# Patient Record
Sex: FEMALE | NOT HISPANIC OR LATINO | Employment: OTHER | URBAN - METROPOLITAN AREA
[De-identification: names, ages, dates, MRNs, and addresses within clinical notes are randomized per-mention and may not be internally consistent; named-entity substitution may affect disease eponyms.]

---

## 2017-05-23 ENCOUNTER — HOSPITAL ENCOUNTER (OUTPATIENT)
Dept: RADIOLOGY | Facility: HOSPITAL | Age: 82
Discharge: HOME | End: 2017-05-23
Attending: SURGERY
Payer: COMMERCIAL

## 2017-05-23 DIAGNOSIS — I70.211 ATHEROSCLEROSIS OF NATIVE ARTERY OF RIGHT LOWER EXTREMITY WITH INTERMITTENT CLAUDICATION (HCC): ICD-10-CM

## 2017-05-23 PROCEDURE — 93925 LOWER EXTREMITY STUDY: CPT

## 2017-05-23 PROCEDURE — 93923 UPR/LXTR ART STDY 3+ LVLS: CPT

## 2017-06-23 ENCOUNTER — ALLSCRIPTS OFFICE VISIT (OUTPATIENT)
Dept: OTHER | Facility: OTHER | Age: 82
End: 2017-06-23

## 2017-07-17 ENCOUNTER — TRANSCRIBE ORDERS (OUTPATIENT)
Dept: ADMINISTRATIVE | Facility: HOSPITAL | Age: 82
End: 2017-07-17

## 2018-01-14 VITALS
HEIGHT: 68 IN | DIASTOLIC BLOOD PRESSURE: 76 MMHG | HEART RATE: 80 BPM | SYSTOLIC BLOOD PRESSURE: 112 MMHG | WEIGHT: 224.31 LBS | BODY MASS INDEX: 34 KG/M2 | RESPIRATION RATE: 16 BRPM

## 2018-06-23 DIAGNOSIS — I70.211 ATHEROSCLEROSIS OF NATIVE ARTERY OF RIGHT LOWER EXTREMITY WITH INTERMITTENT CLAUDICATION (HCC): ICD-10-CM

## 2018-09-21 ENCOUNTER — OFFICE VISIT (OUTPATIENT)
Dept: FAMILY MEDICINE CLINIC | Facility: CLINIC | Age: 83
End: 2018-09-21
Payer: COMMERCIAL

## 2018-09-21 VITALS
BODY MASS INDEX: 30.71 KG/M2 | OXYGEN SATURATION: 97 % | HEART RATE: 91 BPM | WEIGHT: 202 LBS | RESPIRATION RATE: 16 BRPM

## 2018-09-21 DIAGNOSIS — I48.20 CHRONIC ATRIAL FIBRILLATION (HCC): ICD-10-CM

## 2018-09-21 DIAGNOSIS — E03.8 OTHER SPECIFIED HYPOTHYROIDISM: ICD-10-CM

## 2018-09-21 DIAGNOSIS — I10 ESSENTIAL HYPERTENSION: Primary | ICD-10-CM

## 2018-09-21 PROBLEM — I21.4 NON-ST ELEVATION (NSTEMI) MYOCARDIAL INFARCTION (HCC): Status: ACTIVE | Noted: 2018-09-21

## 2018-09-21 PROBLEM — E03.9 ACQUIRED HYPOTHYROIDISM: Status: ACTIVE | Noted: 2018-09-21

## 2018-09-21 PROBLEM — I73.9 PERIPHERAL VASCULAR DISEASE (HCC): Status: ACTIVE | Noted: 2018-09-21

## 2018-09-21 PROBLEM — F33.9 RECURRENT MAJOR DEPRESSIVE DISORDER (HCC): Status: ACTIVE | Noted: 2018-09-21

## 2018-09-21 PROBLEM — I48.91 ATRIAL FIBRILLATION (HCC): Status: ACTIVE | Noted: 2018-09-21

## 2018-09-21 PROCEDURE — 99213 OFFICE O/P EST LOW 20 MIN: CPT | Performed by: FAMILY MEDICINE

## 2018-09-21 PROCEDURE — 1160F RVW MEDS BY RX/DR IN RCRD: CPT | Performed by: FAMILY MEDICINE

## 2018-09-21 RX ORDER — ALPRAZOLAM 0.5 MG/1
0.5 TABLET ORAL
COMMUNITY
End: 2019-04-24 | Stop reason: SDUPTHER

## 2018-09-21 RX ORDER — CHONDROIT/COLLAGEN/HYALURON/AA 80-400-40
CAPSULE ORAL
COMMUNITY
End: 2019-11-27 | Stop reason: SDUPTHER

## 2018-09-21 RX ORDER — METOPROLOL SUCCINATE 50 MG/1
50 TABLET, EXTENDED RELEASE ORAL
COMMUNITY
Start: 2018-04-10 | End: 2021-01-01 | Stop reason: HOSPADM

## 2018-09-21 RX ORDER — SIMVASTATIN 40 MG
40 TABLET ORAL
COMMUNITY
Start: 2018-04-10 | End: 2021-01-01 | Stop reason: HOSPADM

## 2018-09-21 RX ORDER — LEVOTHYROXINE SODIUM 112 UG/1
TABLET ORAL
COMMUNITY
Start: 2018-08-29 | End: 2018-11-26 | Stop reason: SDUPTHER

## 2018-09-21 RX ORDER — VALSARTAN 320 MG
TABLET ORAL
COMMUNITY
Start: 2018-09-11 | End: 2021-01-01 | Stop reason: HOSPADM

## 2018-09-21 RX ORDER — ANTIOX #8/OM3/DHA/EPA/LUT/ZEAX 250-2.5 MG
CAPSULE ORAL
COMMUNITY
End: 2019-11-27 | Stop reason: SDUPTHER

## 2018-09-21 RX ORDER — WARFARIN SODIUM 5 MG/1
TABLET ORAL
COMMUNITY
Start: 2018-05-24 | End: 2021-01-01 | Stop reason: HOSPADM

## 2018-09-25 NOTE — PROGRESS NOTES
Assessment/Plan:    No problem-specific Assessment & Plan notes found for this encounter  Diagnoses and all orders for this visit:    Essential hypertension  -     CBC and differential; Future  -     Lipid panel; Future  -     Comprehensive metabolic panel; Future    Other specified hypothyroidism  -     TSH, 3rd generation; Future    Other orders  -     metoprolol succinate (TOPROL-XL) 50 mg 24 hr tablet; Take 100 mg by mouth  -     simvastatin (ZOCOR) 40 mg tablet; Take 40 mg by mouth  -     ALPRAZolam (XANAX) 0 5 mg tablet; Take 0 5 mg by mouth  -     Glucosamine-Chondroitin (GLUCOSAMINE CHONDR COMPLEX PO); Take 2 tablets by mouth  -     warfarin (COUMADIN) 5 mg tablet; Take 1/2 to 1 tablet daily or as directed  -     VITAMIN A PO; Take 1 tablet by mouth  -     DIOVAN 320 MG tablet;   -     Multiple Vitamins-Minerals (PRESERVISION AREDS 2) CAPS; Take by mouth  -     potassium chloride (KCl) 2 mEq/mL SOLN; Pt taking half tablet daily   -     MAGNESIUM PO; Take 1 tablet by mouth  -     levothyroxine 112 mcg tablet;   -     Hyaluronic Acid 400-80-40 MG CAPS; Take by mouth          Subjective:      Patient ID: Ricki Islas is a 80 y o  female  Patient is establishing as a new patient today at her previous doctor was Dr Gianni Mcelroy  Her med list was updated as well as her medical problems her atrial fibrillation with Coumadin is taken care by her cardiologist she did mention she has refused health maintenance with gyn exams Pap smears and colonoscopies she is aware that that is undiagnosed cancer that could have metastasis and fatality but outcome thus far an 80years old and i'm doing fine'        The following portions of the patient's history were reviewed and updated as appropriate: current medications, past family history, past medical history, past social history, past surgical history and problem list     Review of Systems   Constitutional: Negative  Eyes: Negative  Respiratory: Negative  Cardiovascular: Negative  Gastrointestinal: Negative  Objective:      Pulse 91   Resp 16   Wt 91 6 kg (202 lb)   SpO2 97%   BMI 30 71 kg/m²          Physical Exam   Constitutional: She appears well-developed  Cardiovascular: Normal rate, regular rhythm and normal heart sounds  Pulmonary/Chest: Effort normal and breath sounds normal  No respiratory distress  She has no wheezes  She has no rales  She exhibits no tenderness

## 2018-11-05 LAB
ALBUMIN SERPL-MCNC: 4.4 G/DL (ref 3.5–4.7)
ALBUMIN/GLOB SERPL: 1.4 {RATIO} (ref 1.2–2.2)
ALP SERPL-CCNC: 78 IU/L (ref 39–117)
ALT SERPL-CCNC: 14 IU/L (ref 0–32)
AST SERPL-CCNC: 21 IU/L (ref 0–40)
BASOPHILS # BLD AUTO: 0.1 X10E3/UL (ref 0–0.2)
BASOPHILS NFR BLD AUTO: 2 %
BILIRUB SERPL-MCNC: 0.5 MG/DL (ref 0–1.2)
BUN SERPL-MCNC: 27 MG/DL (ref 8–27)
BUN/CREAT SERPL: 19 (ref 12–28)
CALCIUM SERPL-MCNC: 9.3 MG/DL (ref 8.7–10.3)
CHLORIDE SERPL-SCNC: 102 MMOL/L (ref 96–106)
CHOLEST SERPL-MCNC: 178 MG/DL (ref 100–199)
CO2 SERPL-SCNC: 23 MMOL/L (ref 20–29)
CREAT SERPL-MCNC: 1.42 MG/DL (ref 0.57–1)
EOSINOPHIL # BLD AUTO: 0.4 X10E3/UL (ref 0–0.4)
EOSINOPHIL NFR BLD AUTO: 5 %
ERYTHROCYTE [DISTWIDTH] IN BLOOD BY AUTOMATED COUNT: 14.3 % (ref 12.3–15.4)
GLOBULIN SER-MCNC: 3.1 G/DL (ref 1.5–4.5)
GLUCOSE SERPL-MCNC: 107 MG/DL (ref 65–99)
HCT VFR BLD AUTO: 31.6 % (ref 34–46.6)
HDLC SERPL-MCNC: 54 MG/DL
HGB BLD-MCNC: 10.4 G/DL (ref 11.1–15.9)
IMM GRANULOCYTES # BLD: 0 X10E3/UL (ref 0–0.1)
IMM GRANULOCYTES NFR BLD: 0 %
LABCORP COMMENT: NORMAL
LDLC SERPL CALC-MCNC: 99 MG/DL (ref 0–99)
LYMPHOCYTES # BLD AUTO: 1.5 X10E3/UL (ref 0.7–3.1)
LYMPHOCYTES NFR BLD AUTO: 21 %
MCH RBC QN AUTO: 28.6 PG (ref 26.6–33)
MCHC RBC AUTO-ENTMCNC: 32.9 G/DL (ref 31.5–35.7)
MCV RBC AUTO: 87 FL (ref 79–97)
MONOCYTES # BLD AUTO: 0.9 X10E3/UL (ref 0.1–0.9)
MONOCYTES NFR BLD AUTO: 12 %
NEUTROPHILS # BLD AUTO: 4.5 X10E3/UL (ref 1.4–7)
NEUTROPHILS NFR BLD AUTO: 60 %
PLATELET # BLD AUTO: 266 X10E3/UL (ref 150–379)
POTASSIUM SERPL-SCNC: 4.9 MMOL/L (ref 3.5–5.2)
PROT SERPL-MCNC: 7.5 G/DL (ref 6–8.5)
RBC # BLD AUTO: 3.64 X10E6/UL (ref 3.77–5.28)
SL AMB EGFR AFRICAN AMERICAN: 40 ML/MIN/1.73
SL AMB EGFR NON AFRICAN AMERICAN: 34 ML/MIN/1.73
SL AMB VLDL CHOLESTEROL CALC: 25 MG/DL (ref 5–40)
SODIUM SERPL-SCNC: 139 MMOL/L (ref 134–144)
TRIGL SERPL-MCNC: 124 MG/DL (ref 0–149)
TSH SERPL DL<=0.005 MIU/L-ACNC: 2.39 UIU/ML (ref 0.45–4.5)
WBC # BLD AUTO: 7.5 X10E3/UL (ref 3.4–10.8)

## 2018-11-26 DIAGNOSIS — E03.9 ACQUIRED HYPOTHYROIDISM: Primary | ICD-10-CM

## 2018-11-26 RX ORDER — LEVOTHYROXINE SODIUM 112 UG/1
112 TABLET ORAL DAILY
Qty: 90 TABLET | Refills: 0 | Status: SHIPPED | OUTPATIENT
Start: 2018-11-26 | End: 2019-02-25 | Stop reason: SDUPTHER

## 2019-02-25 DIAGNOSIS — E03.9 ACQUIRED HYPOTHYROIDISM: ICD-10-CM

## 2019-02-25 RX ORDER — LEVOTHYROXINE SODIUM 112 UG/1
112 TABLET ORAL DAILY
Qty: 90 TABLET | Refills: 0 | Status: SHIPPED | OUTPATIENT
Start: 2019-02-25 | End: 2019-05-29 | Stop reason: SDUPTHER

## 2019-03-05 ENCOUNTER — OFFICE VISIT (OUTPATIENT)
Dept: PODIATRY | Facility: CLINIC | Age: 84
End: 2019-03-05
Payer: COMMERCIAL

## 2019-03-05 VITALS
SYSTOLIC BLOOD PRESSURE: 125 MMHG | HEART RATE: 82 BPM | WEIGHT: 202 LBS | DIASTOLIC BLOOD PRESSURE: 81 MMHG | RESPIRATION RATE: 17 BRPM | BODY MASS INDEX: 30.62 KG/M2 | HEIGHT: 68 IN

## 2019-03-05 DIAGNOSIS — M79.671 PAIN IN BOTH FEET: Primary | ICD-10-CM

## 2019-03-05 DIAGNOSIS — M79.672 PAIN IN BOTH FEET: Primary | ICD-10-CM

## 2019-03-05 DIAGNOSIS — B35.1 ONYCHOMYCOSIS: ICD-10-CM

## 2019-03-05 DIAGNOSIS — L03.039 PARONYCHIA OF TOENAIL, UNSPECIFIED LATERALITY: ICD-10-CM

## 2019-03-05 DIAGNOSIS — L60.0 INGROWN TOENAIL: ICD-10-CM

## 2019-03-05 DIAGNOSIS — I70.209 PERIPHERAL ARTERIOSCLEROSIS (HCC): ICD-10-CM

## 2019-03-05 PROCEDURE — 99202 OFFICE O/P NEW SF 15 MIN: CPT | Performed by: PODIATRIST

## 2019-03-05 NOTE — PROGRESS NOTES
Assessment/Plan:  Pain upon ambulation  Mild peripheral artery disease  Ingrown toenail left hallux  Paronychia bilateral   Mycosis of nail  Plan  Foot exam performed  Patient educated on condition  All mycotic nails debrided without pain or complication  No problem-specific Assessment & Plan notes found for this encounter  There are no diagnoses linked to this encounter  Subjective:  Patient has pain in her feet and toes with ambulation  She is seen on referral   She has pain in both big toes  Past Medical History:   Diagnosis Date    Basal cell carcinoma        Past Surgical History:   Procedure Laterality Date    MOHS SURGERY  01/1998    shaving lesion, on face below left side of nose basal cell carcinoma       Allergies   Allergen Reactions    Other Other (See Comments)     Category: Adverse Reaction; Annotation - [id number]: HORSE SERUM - Pt states was used several years ago in tetanus boosters when she was a child           Current Outpatient Medications:     ALPRAZolam (XANAX) 0 5 mg tablet, Take 0 5 mg by mouth, Disp: , Rfl:     DIOVAN 320 MG tablet, , Disp: , Rfl:     Glucosamine-Chondroitin (GLUCOSAMINE CHONDR COMPLEX PO), Take 2 tablets by mouth, Disp: , Rfl:     levothyroxine 112 mcg tablet, Take 1 tablet (112 mcg total) by mouth daily, Disp: 90 tablet, Rfl: 0    MAGNESIUM PO, Take 1 tablet by mouth, Disp: , Rfl:     metoprolol succinate (TOPROL-XL) 50 mg 24 hr tablet, Take 100 mg by mouth, Disp: , Rfl:     Multiple Vitamins-Minerals (PRESERVISION AREDS 2) CAPS, Take by mouth, Disp: , Rfl:     potassium chloride (KCl) 2 mEq/mL SOLN, Pt taking half tablet daily , Disp: , Rfl:     simvastatin (ZOCOR) 40 mg tablet, Take 40 mg by mouth, Disp: , Rfl:     VITAMIN A PO, Take 1 tablet by mouth, Disp: , Rfl:     warfarin (COUMADIN) 5 mg tablet, Take 1/2 to 1 tablet daily or as directed , Disp: , Rfl:     Hyaluronic Acid 400-80-40 MG CAPS, Take by mouth, Disp: , Rfl: Patient Active Problem List   Diagnosis    Non-ST elevation (NSTEMI) myocardial infarction [facility])    Peripheral vascular disease ([facility] )    Atrial fibrillation (HCC)    Recurrent major depressive disorder ([place] [address] )    Acquired hypothyroidism          Patient ID: Kristofer Burkett is a 80 y o  female  HPI    The following portions of the patient's history were reviewed and updated as appropriate: She  has a past medical history of Basal cell carcinoma  She   Patient Active Problem List    Diagnosis Date Noted    Non-ST elevation (NSTEMI) myocardial infarction ([place] [address] ) 09/21/2018    Peripheral vascular disease ([place] [address] ) 09/21/2018    Atrial fibrillation ([place] [address] ) 09/21/2018    Recurrent major depressive disorder ([place] [address] ) 09/21/2018    Acquired hypothyroidism 09/21/2018     She  has a past surgical history that includes Mohs surgery (01/1998)  Her family history includes Alzheimer's disease in her mother; Heart disease in her maternal uncle; Parkinsonism in her mother; Stroke in her father; Vascular Disease in her father  She  reports that she quit smoking about 31 years ago  She has never used smokeless tobacco  She reports that she does not drink alcohol or use drugs    Current Outpatient Medications   Medication Sig Dispense Refill    ALPRAZolam (XANAX) 0 5 mg tablet Take 0 5 mg by mouth      DIOVAN 320 MG tablet       Glucosamine-Chondroitin (GLUCOSAMINE CHONDR COMPLEX PO) Take 2 tablets by mouth      levothyroxine 112 mcg tablet Take 1 tablet (112 mcg total) by mouth daily 90 tablet 0    MAGNESIUM PO Take 1 tablet by mouth      metoprolol succinate (TOPROL-XL) 50 mg 24 hr tablet Take 100 mg by mouth      Multiple Vitamins-Minerals (PRESERVISION AREDS 2) CAPS Take by mouth      potassium chloride (KCl) 2 mEq/mL SOLN Pt taking half tablet daily       simvastatin (ZOCOR) 40 mg tablet Take 40 mg by mouth      VITAMIN A PO Take 1 tablet by mouth      warfarin (COUMADIN) 5 mg tablet Take 1/2 to 1 tablet daily or as directed   Hyaluronic Acid 400-80-40 MG CAPS Take by mouth       No current facility-administered medications for this visit  Current Outpatient Medications on File Prior to Visit   Medication Sig    ALPRAZolam (XANAX) 0 5 mg tablet Take 0 5 mg by mouth    DIOVAN 320 MG tablet     Glucosamine-Chondroitin (GLUCOSAMINE CHONDR COMPLEX PO) Take 2 tablets by mouth    levothyroxine 112 mcg tablet Take 1 tablet (112 mcg total) by mouth daily    MAGNESIUM PO Take 1 tablet by mouth    metoprolol succinate (TOPROL-XL) 50 mg 24 hr tablet Take 100 mg by mouth    Multiple Vitamins-Minerals (PRESERVISION AREDS 2) CAPS Take by mouth    potassium chloride (KCl) 2 mEq/mL SOLN Pt taking half tablet daily     simvastatin (ZOCOR) 40 mg tablet Take 40 mg by mouth    VITAMIN A PO Take 1 tablet by mouth    warfarin (COUMADIN) 5 mg tablet Take 1/2 to 1 tablet daily or as directed   Hyaluronic Acid 400-80-40 MG CAPS Take by mouth     No current facility-administered medications on file prior to visit  She is allergic to other       Vitals:    03/05/19 1034   BP: 125/81   Pulse: 82   Resp: 17       Review of Systems      Objective:  Patient's shoes and socks removed     Foot Exam    General  General Appearance: appears stated age and healthy   Orientation: alert and oriented to person, place, and time   Affect: appropriate       Right Foot/Ankle     Inspection and Palpation  Tenderness: metatarsals   Swelling: dorsum and metatarsals   Arch: pes planus  Hammertoes: fifth toe  Hallux valgus: yes  Hallux limitus: no  Skin Exam: dry skin;     Neurovascular  Dorsalis pedis: 1+  Posterior tibial: 1+  Superficial peroneal nerve sensation: diminished  Deep peroneal nerve sensation: diminished      Left Foot/Ankle      Inspection and Palpation  Tenderness: metatarsals   Swelling: dorsum and metatarsals   Arch: pes planus  Hammertoes: fifth toe  Hallux valgus: yes  Hallux limitus: no  Skin Exam: dry skin; Neurovascular  Dorsalis pedis: 1+  Posterior tibial: 1+  Superficial peroneal nerve sensation: diminished  Deep peroneal nerve sensation: diminished        Physical Exam   Constitutional: She appears well-developed and well-nourished  Cardiovascular: Normal rate and regular rhythm  Pulses:       Dorsalis pedis pulses are 1+ on the right side, and 1+ on the left side  Posterior tibial pulses are 1+ on the right side, and 1+ on the left side  Q, 9 findings bilateral   Negative digital hair  Positive abnormal cooling  Feet:   Right Foot:   Skin Integrity: Positive for dry skin  Left Foot:   Skin Integrity: Positive for dry skin  Skin: Capillary refill takes 2 to 3 seconds  Thin atrophic skin noted bilateral   Severe mycotic toenail bilateral   Hallux bilateral demonstrates wide incurvated nail with mild paronychia  Psychiatric: She has a normal mood and affect

## 2019-03-14 RX ORDER — MULTIVITAMIN/IRON/FOLIC ACID 18MG-0.4MG
1 TABLET ORAL
COMMUNITY
End: 2021-01-01 | Stop reason: HOSPADM

## 2019-03-14 RX ORDER — AMPICILLIN TRIHYDRATE 250 MG
500 CAPSULE ORAL
COMMUNITY
End: 2021-01-01 | Stop reason: HOSPADM

## 2019-04-24 ENCOUNTER — OFFICE VISIT (OUTPATIENT)
Dept: FAMILY MEDICINE CLINIC | Facility: CLINIC | Age: 84
End: 2019-04-24
Payer: COMMERCIAL

## 2019-04-24 VITALS
WEIGHT: 195 LBS | OXYGEN SATURATION: 98 % | BODY MASS INDEX: 29.55 KG/M2 | DIASTOLIC BLOOD PRESSURE: 60 MMHG | TEMPERATURE: 97.9 F | HEART RATE: 92 BPM | SYSTOLIC BLOOD PRESSURE: 112 MMHG | HEIGHT: 68 IN

## 2019-04-24 DIAGNOSIS — E03.9 ACQUIRED HYPOTHYROIDISM: ICD-10-CM

## 2019-04-24 DIAGNOSIS — I73.9 PVD (PERIPHERAL VASCULAR DISEASE) (HCC): ICD-10-CM

## 2019-04-24 DIAGNOSIS — D64.9 ANEMIA, UNSPECIFIED TYPE: Primary | ICD-10-CM

## 2019-04-24 DIAGNOSIS — I10 ESSENTIAL HYPERTENSION: ICD-10-CM

## 2019-04-24 LAB
SL AMB POCT HEMOGLOBIN AIC: 6 (ref ?–6.5)
SL AMB POCT HGB: 8.6

## 2019-04-24 PROCEDURE — 3074F SYST BP LT 130 MM HG: CPT | Performed by: FAMILY MEDICINE

## 2019-04-24 PROCEDURE — 99213 OFFICE O/P EST LOW 20 MIN: CPT | Performed by: FAMILY MEDICINE

## 2019-04-24 PROCEDURE — 85018 HEMOGLOBIN: CPT | Performed by: FAMILY MEDICINE

## 2019-04-24 PROCEDURE — 3078F DIAST BP <80 MM HG: CPT | Performed by: FAMILY MEDICINE

## 2019-04-24 PROCEDURE — 83036 HEMOGLOBIN GLYCOSYLATED A1C: CPT | Performed by: FAMILY MEDICINE

## 2019-04-24 RX ORDER — ALPRAZOLAM 0.5 MG/1
0.5 TABLET ORAL
Qty: 90 TABLET | Refills: 0 | Status: SHIPPED | OUTPATIENT
Start: 2019-04-24 | End: 2019-11-27

## 2019-05-29 DIAGNOSIS — E03.9 ACQUIRED HYPOTHYROIDISM: ICD-10-CM

## 2019-05-29 RX ORDER — LEVOTHYROXINE SODIUM 112 UG/1
112 TABLET ORAL DAILY
Qty: 90 TABLET | Refills: 0 | Status: SHIPPED | OUTPATIENT
Start: 2019-05-29 | End: 2019-09-10

## 2019-09-05 ENCOUNTER — OFFICE VISIT (OUTPATIENT)
Dept: FAMILY MEDICINE CLINIC | Facility: CLINIC | Age: 84
End: 2019-09-05
Payer: COMMERCIAL

## 2019-09-05 VITALS
BODY MASS INDEX: 30.17 KG/M2 | HEART RATE: 65 BPM | WEIGHT: 199.1 LBS | HEIGHT: 68 IN | RESPIRATION RATE: 20 BRPM | OXYGEN SATURATION: 97 % | SYSTOLIC BLOOD PRESSURE: 140 MMHG | TEMPERATURE: 97 F | DIASTOLIC BLOOD PRESSURE: 70 MMHG

## 2019-09-05 DIAGNOSIS — R73.9 HYPERGLYCEMIA: ICD-10-CM

## 2019-09-05 DIAGNOSIS — D64.9 ANEMIA, UNSPECIFIED TYPE: Primary | ICD-10-CM

## 2019-09-05 LAB — SL AMB POCT HEMOGLOBIN AIC: 5.8 (ref ?–6.5)

## 2019-09-05 PROCEDURE — 83036 HEMOGLOBIN GLYCOSYLATED A1C: CPT | Performed by: FAMILY MEDICINE

## 2019-09-05 PROCEDURE — 99213 OFFICE O/P EST LOW 20 MIN: CPT | Performed by: FAMILY MEDICINE

## 2019-09-05 RX ORDER — MULTIVITAMIN WITH IRON
1 TABLET ORAL DAILY
COMMUNITY
End: 2021-01-01 | Stop reason: HOSPADM

## 2019-09-09 DIAGNOSIS — E03.9 ACQUIRED HYPOTHYROIDISM: ICD-10-CM

## 2019-09-09 RX ORDER — LEVOTHYROXINE SODIUM 112 UG/1
112 TABLET ORAL DAILY
Qty: 90 TABLET | Refills: 0 | Status: CANCELLED | OUTPATIENT
Start: 2019-09-09

## 2019-09-10 DIAGNOSIS — E03.9 ACQUIRED HYPOTHYROIDISM: ICD-10-CM

## 2019-09-10 RX ORDER — LEVOTHYROXINE SODIUM 112 UG/1
112 TABLET ORAL DAILY
Qty: 90 TABLET | Refills: 0 | Status: SHIPPED | OUTPATIENT
Start: 2019-09-10 | End: 2019-11-27 | Stop reason: SDUPTHER

## 2019-09-16 NOTE — PROGRESS NOTES
Assessment/Plan:    No problem-specific Assessment & Plan notes found for this encounter  Diagnoses and all orders for this visit:    Anemia, unspecified type  -     Ferritin; Future    Hyperglycemia  -     POCT hemoglobin A1c    Other orders  -     potassium chloride (KCl) 2 mEq/mL SOLN; Pt taking half tablet daily  -     Magnesium 250 MG TABS; Take 1 tablet by mouth daily          Subjective:      Patient ID: Bakari Bolanos is a 80 y o  female  Here for follow up  recemntly saw cardio who referred her to vascular  As she is having trouble with claudication        The following portions of the patient's history were reviewed and updated as appropriate: current medications, past family history, past medical history, past social history, past surgical history and problem list     Review of Systems   Constitutional: Negative  HENT: Negative  Eyes: Negative  Respiratory: Negative  Cardiovascular: Negative  Gastrointestinal: Negative  Objective:      /70 (BP Location: Left arm, Patient Position: Sitting, Cuff Size: Adult)   Pulse 65   Temp (!) 97 °F (36 1 °C) (Tympanic)   Resp 20   Ht 5' 8" (1 727 m)   Wt 90 3 kg (199 lb 1 6 oz)   SpO2 97%   BMI 30 27 kg/m²          Physical Exam   Constitutional: She appears well-developed and well-nourished  HENT:   Head: Normocephalic and atraumatic  Cardiovascular: Normal rate, regular rhythm and normal heart sounds     Pulmonary/Chest: Effort normal and breath sounds normal

## 2019-11-27 ENCOUNTER — OFFICE VISIT (OUTPATIENT)
Dept: FAMILY MEDICINE CLINIC | Facility: CLINIC | Age: 84
End: 2019-11-27
Payer: COMMERCIAL

## 2019-11-27 VITALS
HEART RATE: 67 BPM | SYSTOLIC BLOOD PRESSURE: 140 MMHG | BODY MASS INDEX: 30.71 KG/M2 | WEIGHT: 202 LBS | OXYGEN SATURATION: 97 % | DIASTOLIC BLOOD PRESSURE: 58 MMHG | TEMPERATURE: 97.7 F | RESPIRATION RATE: 18 BRPM

## 2019-11-27 DIAGNOSIS — I73.9 PVD (PERIPHERAL VASCULAR DISEASE) (HCC): Primary | ICD-10-CM

## 2019-11-27 DIAGNOSIS — F41.9 ANXIETY: ICD-10-CM

## 2019-11-27 DIAGNOSIS — E03.9 ACQUIRED HYPOTHYROIDISM: ICD-10-CM

## 2019-11-27 DIAGNOSIS — L98.9 SKIN LESION: ICD-10-CM

## 2019-11-27 PROBLEM — H35.30 MACULAR DEGENERATION: Status: ACTIVE | Noted: 2019-11-27

## 2019-11-27 PROBLEM — Z95.0 CARDIAC RESYNCHRONIZATION THERAPY PACEMAKER (CRT-P) IN PLACE: Status: ACTIVE | Noted: 2019-11-27

## 2019-11-27 PROCEDURE — 1101F PT FALLS ASSESS-DOCD LE1/YR: CPT | Performed by: FAMILY MEDICINE

## 2019-11-27 PROCEDURE — 99213 OFFICE O/P EST LOW 20 MIN: CPT | Performed by: FAMILY MEDICINE

## 2019-11-27 RX ORDER — LEVOTHYROXINE SODIUM 112 UG/1
112 TABLET ORAL DAILY
Qty: 90 TABLET | Refills: 0 | Status: SHIPPED | OUTPATIENT
Start: 2019-11-27 | End: 2020-01-01 | Stop reason: SDUPTHER

## 2019-11-27 RX ORDER — ALPRAZOLAM 0.5 MG/1
0.5 TABLET ORAL
Qty: 90 TABLET | Refills: 0 | Status: SHIPPED | OUTPATIENT
Start: 2019-11-27 | End: 2021-01-01 | Stop reason: HOSPADM

## 2019-11-27 NOTE — PROGRESS NOTES
[address] Note  [place], 19     Medardo Ferrara MRN: [id number] : 1935 Age: 80 y o  Assessment/Plan        1  PVD (peripheral vascular disease) ([facility] [address] )  Ambulatory referral to Vascular Surgery   2  Acquired hypothyroidism  levothyroxine 112 mcg tablet   3  Skin lesion  Ambulatory referral to Dermatology   4  Anxiety  ALPRAZolam (XANAX) 0 5 mg tablet       Presents for general follow up and to establish care with new PCP  Overall doing well  For skin lesion, given her prior history of skin cancer recommended she either return to our office or to dermatologist for biopsy  For history of vascular disease referral placed per patient request, she states her cardiologist wanted her to be checked but local here is easier for her to be seen at  Takes Xanax prior to getting monthly eye injections for macular degeneration, refill provided today  Medardo Ferrara acknowledged understanding of treatment plan, all questions answered  Plan discussed with attending physician Dr Jose Jara  Subjective      Medardo Ferrara is a 80 y o  female  Presents today for follow up, establish care  Chronic medical conditions include macular degeneration (followed by [facility], Shanae Mina, has been getting injections regularly/monthly), history of NSTEMi, a fib with pacemaker, followed by cardiology (Dr Lorena Padgett)  Left anterior thigh skin lesion present for several years  Has not changed in size, color  No itching or bleeding  The following portions of the patient's history were reviewed and updated as appropriate: allergies, current medications, past family history, past medical history, past social history, past surgical history and problem list     Typically only needs Xanax before her eye injections  Review of Systems   Constitutional: Negative for chills and fever  Respiratory: Negative for cough and shortness of breath      Cardiovascular: Negative for chest pain and leg swelling  Current Outpatient Medications   Medication Sig Dispense Refill    ALPRAZolam (XANAX) 0 5 mg tablet Take 1 tablet (0 5 mg total) by mouth daily at bedtime as needed for anxiety 90 tablet 0    Cinnamon 500 MG capsule Take 500 mg by mouth      DIOVAN 320 MG tablet       Glucosamine-Chondroitin (GLUCOSAMINE CHONDR COMPLEX PO) Take 2 tablets by mouth      Glucosamine-MSM-Hyaluronic Acd (JO[employer] [facility]ALTH PO) Take 2 tablets by mouth      levothyroxine 112 mcg tablet Take 1 tablet (112 mcg total) by mouth daily 90 tablet 0    Magnesium 250 MG TABS Take 1 tablet by mouth daily      Multiple Vitamins-Minerals (CENTRUM ULTRA WOMENS) TABS Take 1 tablet by mouth      Multiple Vitamins-Minerals (PRESERVISION AREDS 2+MULTI VIT PO) Take 2 tablets by mouth      potassium chloride (KCl) 2 mEq/mL SOLN Pt taking half tablet daily      simvastatin (ZOCOR) 40 mg tablet Take 40 mg by mouth      VITAMIN D PO Take by mouth      warfarin (COUMADIN) 5 mg tablet Take 1/2 to 1 tablet daily or as directed   metoprolol succinate (TOPROL-XL) 50 mg 24 hr tablet Take 50 mg by mouth 3 tablets in the am and 3 tablets in the pm      VITAMIN A PO Take 1 tablet by mouth       No current facility-administered medications for this visit  Objective      /58 (BP Location: Left arm, Patient Position: Sitting)   Pulse 67   Temp 97 7 °F (36 5 °C) (Tympanic)   Resp 18   Wt 91 6 kg (202 lb)   SpO2 97%   BMI 30 71 kg/m²     Physical Exam   Constitutional: She is oriented to person, place, and time  She appears well-developed and well-nourished  HENT:   Head: Normocephalic and atraumatic  Cardiovascular: Normal rate and regular rhythm  Pulmonary/Chest: Effort normal and breath sounds normal    Neurological: She is alert and oriented to person, place, and time  Skin: Skin is warm and dry  Vitals reviewed        Left anterior thigh lesion, rectangular and raised with well demarcated bordered, dark brown, scaly, 1 5cm x 5cm     Some portions of this record may have been generated with voice recognition software  There may be translation, syntax, or grammatical errors  Occasional wrong word or "sound-a-like" substitutions may have occurred due to the inherent limitations of the voice recognition software  Read the chart carefully and recognize, using context, where substations may have occurred  If you have any questions, please contact the dictating provider for clarification or correction, as needed

## 2020-01-01 ENCOUNTER — HOSPITAL ENCOUNTER (OUTPATIENT)
Dept: RADIOLOGY | Facility: HOSPITAL | Age: 85
Discharge: HOME | End: 2020-05-20
Payer: COMMERCIAL

## 2020-01-01 ENCOUNTER — OFFICE VISIT (OUTPATIENT)
Dept: FAMILY MEDICINE CLINIC | Facility: CLINIC | Age: 85
End: 2020-01-01
Payer: COMMERCIAL

## 2020-01-01 ENCOUNTER — TELEPHONE (OUTPATIENT)
Dept: ADMINISTRATIVE | Facility: HOSPITAL | Age: 85
End: 2020-01-01

## 2020-01-01 ENCOUNTER — OFFICE VISIT (OUTPATIENT)
Dept: VASCULAR SURGERY | Facility: CLINIC | Age: 85
End: 2020-01-01
Payer: COMMERCIAL

## 2020-01-01 ENCOUNTER — TELEPHONE (OUTPATIENT)
Dept: FAMILY MEDICINE CLINIC | Facility: CLINIC | Age: 85
End: 2020-01-01

## 2020-01-01 VITALS
HEART RATE: 81 BPM | RESPIRATION RATE: 18 BRPM | SYSTOLIC BLOOD PRESSURE: 130 MMHG | BODY MASS INDEX: 28.19 KG/M2 | HEIGHT: 68 IN | TEMPERATURE: 97.3 F | DIASTOLIC BLOOD PRESSURE: 80 MMHG | WEIGHT: 186 LBS | OXYGEN SATURATION: 97 %

## 2020-01-01 VITALS
DIASTOLIC BLOOD PRESSURE: 76 MMHG | HEART RATE: 60 BPM | SYSTOLIC BLOOD PRESSURE: 124 MMHG | WEIGHT: 190 LBS | HEIGHT: 68 IN | BODY MASS INDEX: 28.79 KG/M2 | TEMPERATURE: 97.9 F

## 2020-01-01 DIAGNOSIS — B35.1 ONYCHOMYCOSIS: ICD-10-CM

## 2020-01-01 DIAGNOSIS — I73.9 PVD (PERIPHERAL VASCULAR DISEASE) (HCC): ICD-10-CM

## 2020-01-01 DIAGNOSIS — I73.9 PERIPHERAL VASCULAR DISEASE (HCC): ICD-10-CM

## 2020-01-01 DIAGNOSIS — E03.9 ACQUIRED HYPOTHYROIDISM: ICD-10-CM

## 2020-01-01 DIAGNOSIS — I48.0 PAROXYSMAL ATRIAL FIBRILLATION (HCC): ICD-10-CM

## 2020-01-01 DIAGNOSIS — I70.209 PERIPHERAL ARTERIOSCLEROSIS (HCC): Primary | ICD-10-CM

## 2020-01-01 DIAGNOSIS — I70.209 PERIPHERAL ARTERIOSCLEROSIS (HCC): ICD-10-CM

## 2020-01-01 DIAGNOSIS — L60.0 INGROWN TOENAIL: ICD-10-CM

## 2020-01-01 DIAGNOSIS — N90.89 VULVAR MASS: Primary | ICD-10-CM

## 2020-01-01 PROCEDURE — 93925 LOWER EXTREMITY STUDY: CPT

## 2020-01-01 PROCEDURE — 93925 LOWER EXTREMITY STUDY: CPT | Performed by: SURGERY

## 2020-01-01 PROCEDURE — 93978 VASCULAR STUDY: CPT

## 2020-01-01 PROCEDURE — 93923 UPR/LXTR ART STDY 3+ LVLS: CPT

## 2020-01-01 PROCEDURE — 3078F DIAST BP <80 MM HG: CPT | Performed by: PHYSICIAN ASSISTANT

## 2020-01-01 PROCEDURE — 99214 OFFICE O/P EST MOD 30 MIN: CPT | Performed by: PHYSICIAN ASSISTANT

## 2020-01-01 PROCEDURE — 3074F SYST BP LT 130 MM HG: CPT | Performed by: PHYSICIAN ASSISTANT

## 2020-01-01 PROCEDURE — 1160F RVW MEDS BY RX/DR IN RCRD: CPT | Performed by: PHYSICIAN ASSISTANT

## 2020-01-01 PROCEDURE — 1036F TOBACCO NON-USER: CPT | Performed by: PHYSICIAN ASSISTANT

## 2020-01-01 PROCEDURE — 93922 UPR/L XTREMITY ART 2 LEVELS: CPT | Performed by: SURGERY

## 2020-01-01 PROCEDURE — 93978 VASCULAR STUDY: CPT | Performed by: SURGERY

## 2020-01-01 PROCEDURE — 99213 OFFICE O/P EST LOW 20 MIN: CPT | Performed by: FAMILY MEDICINE

## 2020-01-01 RX ORDER — LEVOTHYROXINE SODIUM 112 UG/1
112 TABLET ORAL DAILY
Qty: 90 TABLET | Refills: 0 | Status: SHIPPED | OUTPATIENT
Start: 2020-01-01 | End: 2020-01-01

## 2020-01-01 RX ORDER — LEVOTHYROXINE SODIUM 112 UG/1
TABLET ORAL
Qty: 90 TABLET | Refills: 0 | Status: SHIPPED | OUTPATIENT
Start: 2020-01-01 | End: 2020-01-01 | Stop reason: SDUPTHER

## 2020-01-01 RX ORDER — LEVOTHYROXINE SODIUM 112 UG/1
112 TABLET ORAL DAILY
Qty: 90 TABLET | Refills: 1 | Status: SHIPPED | OUTPATIENT
Start: 2020-01-01 | End: 2021-01-01

## 2020-01-01 RX ORDER — LEVOTHYROXINE SODIUM 112 UG/1
112 TABLET ORAL DAILY
Qty: 90 TABLET | Refills: 0 | OUTPATIENT
Start: 2020-01-01

## 2020-01-03 ENCOUNTER — CONSULT (OUTPATIENT)
Dept: VASCULAR SURGERY | Facility: CLINIC | Age: 85
End: 2020-01-03
Payer: COMMERCIAL

## 2020-01-03 VITALS
WEIGHT: 201 LBS | BODY MASS INDEX: 30.46 KG/M2 | HEART RATE: 68 BPM | SYSTOLIC BLOOD PRESSURE: 148 MMHG | DIASTOLIC BLOOD PRESSURE: 70 MMHG | HEIGHT: 68 IN

## 2020-01-03 DIAGNOSIS — I73.9 PERIPHERAL VASCULAR DISEASE (HCC): Primary | ICD-10-CM

## 2020-01-03 DIAGNOSIS — I70.209 PERIPHERAL ARTERIOSCLEROSIS (HCC): ICD-10-CM

## 2020-01-03 DIAGNOSIS — I73.9 PVD (PERIPHERAL VASCULAR DISEASE) (HCC): ICD-10-CM

## 2020-01-03 PROCEDURE — 99204 OFFICE O/P NEW MOD 45 MIN: CPT | Performed by: PHYSICIAN ASSISTANT

## 2020-01-03 PROCEDURE — 1160F RVW MEDS BY RX/DR IN RCRD: CPT | Performed by: PHYSICIAN ASSISTANT

## 2020-01-03 NOTE — PATIENT INSTRUCTIONS
Peripheral arterial disease with non-lifestyle limiting claudication  Atrial fibrillation      -complete basic activities the comfortably without leg pain  No wounds or rest pain  -complains of R hip pain/aches with activity      Plan:  -continue a regular walking program to improve circulation  -continue with healthy lifestyle choices - including: heart healthy cardiac diet, low sodium    -non-smoker  -continue with optimally tolerated medical therapy on warfarin and simvastatin 40  -we reviewed mostly recent labs which include A1c equal to 6  Last lipids November 2018 LDL 99; LDL goal less than 70  -check AOIL and BARBARA in 6 months  -we reviewed most recent vascular study reports which I have access to which include November/2018 BELLA on the right 0 52, BELLA on the left 0 78   -Follow up in 1 year or sooner if problems  -Call to be seen sooner, if increased leg pain, numbness, coldness, or wounds

## 2020-01-03 NOTE — LETTER
January 5, 2020     Mary León, [address]    Patient: Brea Urbina   YOB: 1935   Date of Visit: 1/3/2020     Dear Dr Anurag Nava      Thank you for referring Josse Hernandez to me for evaluation  Below are the relevant portions of my assessment and plan of care  If you have questions, please do not hesitate to call me  I look forward to following Viki Caro along with you  Sincerely,        Allison Kinney PA-C        CC: Nato Rhoades, DO    Progress Notes:    Assessment/Plan:    Peripheral vascular disease ([facility] [address] )    Peripheral arterial disease with non-lifestyle limiting claudication  Atrial fibrillation  Hypertension  Hyperlipidemia    -complete basic activities the comfortably without leg pain  No rest pain, night cramps or wounds     -complains of chronic R hip pain/aches with activity (claudication v orthopedic )    VAS BARBARA 5/23/17  R 0 42/95/28; significant LE arterial occlusive disease; > 75% CFA; < 50% PFA   L 0 97/161/90; no evidence of significant LE arterial disease    No recent labs  LDL 99 and A1C 6 (Nov '18)    Plan:  -Patient would like to continue with conservative measures and routine surveillance of peripheral arterial disease    -Continue a regular walking program to improve circulation   -Continue with healthy lifestyle choices - including: heart healthy cardiac diet, low sodium    -Non-smoker  -Continue with optimally tolerated medical therapy on warfarin and simvastatin 40   -LDL goal less than 70   -We reviewed vascular study reports of which I have access  -Check AOIL and BARBARA  -Follow up to review vascular studies   -We discussed reasons why she should be seen sooner and indications for which we may need to consider invasive procedures - increased leg pain, numbness, coldness, or wounds

## 2020-01-03 NOTE — PROGRESS NOTES
Assessment/Plan:    Peripheral vascular disease ([facility] [address] )    Peripheral arterial disease with non-lifestyle limiting claudication  Atrial fibrillation  Hypertension  Hyperlipidemia    -complete basic activities the comfortably without leg pain  No rest pain, night cramps or wounds     -complains of chronic R hip pain/aches with activity (claudication v orthopedic )    VAS BARBARA 5/23/17  R 0 42/95/28; significant LE arterial occlusive disease; > 75% CFA; < 50% PFA   L 0 97/161/90; no evidence of significant LE arterial disease    No recent labs  LDL 99 and A1C 6 (Nov '18)    Plan:  -Patient would like to continue with conservative measures and routine surveillance of peripheral arterial disease    -Continue a regular walking program to improve circulation   -Continue with healthy lifestyle choices - including: heart healthy cardiac diet, low sodium    -Non-smoker  -Continue with optimally tolerated medical therapy on warfarin and simvastatin 40   -LDL goal less than 70   -We reviewed vascular study reports of which I have access  -Check AOIL and BARBARA  -Follow up to review vascular studies   -We discussed reasons why she should be seen sooner and indications for which we may need to consider invasive procedures - increased leg pain, numbness, coldness, or wounds  Subjective:      Patient ID: Marah Christensen is a 80 y o  female  Pt is a new patient at our pratice, referred by Dr Stephon Watkins  Pt is asymptomatic at this time  R>L  Pt currently takes Simvastatin, warfarin  HPI    Miguel Crawley 80 y  o DM, Htn, HLD, PAF, CKD, PPM/CRT, AIOD, peripheral arterial disease who is referred for consult regarding PAD  Patient reports that she is not very active but she is able to complete most basic activities without leg pain  When she exerts herself too much, she develops R hip pain which resolves with rest  She reports that she has R hip pain chronically which has not been worsening   She denies thigh and calf claudication at her level of activity  She has no rest pain or wounds  She does not recall any recent vascular studies  She is a former patient of Dr Ariella Briones and she receives cardiac care at [facility]  I reviewed her Epic and Care Everywhere charts and we reviewed her most recent labs and cardiovascular studies  She is on Simvastatin and warfarin  Echo 4/2/19: EF 60%  Carotid 11/30/18 ([facility]):  R 0-19%, L 20-49%    VAS BARBARA 11/26/18 ([facility])  R: Doppler waveforms in the right lower extremity are consistent with severe    upstream aorto-iliac disease  The peroneal artery was not visualized  Resting BELLA: 0 52     L: Doppler waveforms in the left  lower extremity are consistent with  moderate     upstream proximal aorto-iliac disease  No hemodynamically significant stenosis in the left lower extremity  Resting BELLA: 0 78     VAS [facility] 5/23/17 ([facility]  R 0 42/95/28; significant LE arterial occlusive disease; > 75% CFA; < 50% PFA   L  0 97/161/90; no evidence of significant LE arterial disease    The following portions of the patient's history were reviewed and updated as appropriate: allergies, current medications, past family history, past medical history, past social history, past surgical history and problem list     Review of Systems   Constitutional: Negative  HENT: Negative  Eyes: Negative  Respiratory: Negative  Negative for chest tightness and shortness of breath  Cardiovascular: Negative  Negative for chest pain  Gastrointestinal: Negative  Endocrine: Negative  Genitourinary: Negative  Musculoskeletal: Negative  Negative for gait problem  Skin: Positive for color change (Discoloration R>L)  Allergic/Immunologic: Negative  Neurological: Negative for weakness and numbness  Hematological: Negative  Psychiatric/Behavioral: Negative          Objective:    /70 (BP Location: Left arm, Patient Position: Sitting)   Pulse 68   Ht 5' 8" (1 727 m)   Wt 91 2 kg (201 lb)   BMI 30 56 kg/m²      Physical Exam   Constitutional: She is oriented to person, place, and time  She appears well-developed and well-nourished  She is cooperative  HENT:   Head: Normocephalic and atraumatic  Eyes: Pupils are equal, round, and reactive to light  EOM are normal    Neck: Trachea normal  Neck supple  No JVD present  No thyromegaly present  Cardiovascular: Normal rate, S1 normal and S2 normal  An irregularly irregular rhythm present  Exam reveals no gallop and no friction rub  Murmur (2/6 sm) heard  Pulses:       Carotid pulses are 2+ on the right side, and 2+ on the left side  Radial pulses are 2+ on the right side, and 2+ on the left side  Femoral pulses are 0 on the right side, and 2+ on the left side  Popliteal pulses are 0 on the right side  Dorsalis pedis pulses are 0 on the right side, and 1+ on the left side  Posterior tibial pulses are 0 on the right side  Pulmonary/Chest: Effort normal and breath sounds normal  No accessory muscle usage  No respiratory distress  She has no wheezes  She has no rales  Abdominal: Soft  Bowel sounds are normal  She exhibits no distension  There is no hepatosplenomegaly  There is no tenderness  Musculoskeletal: Normal range of motion  She exhibits no edema or deformity  Neurological: She is alert and oriented to person, place, and time  Grossly normal    Skin: Skin is warm and dry  No lesion and no rash noted  No cyanosis  Nails show no clubbing  Psychiatric: She has a normal mood and affect  Nursing note and vitals reviewed  VAS BARBARA 5/23/17  RIGHT LOWER LIMB:Abnormal  Evaluation shows greater than 75% stenosis in the common femoral artery and  less than 50 % stenosis in the profunda femoral artery  This resting evaluation shows evidence of significant lower extremity arterial  occlusive disease  Ankle/Brachial index:  0 42  , Prior 0 46  PVR/ PPG tracings are dampened    Metatarsal pressure of  95 mmHg, Prior 25  Great toe pressure of  28 mmHg, within the healing range, Prior 28 mmHg  LEFT LOWER LIMB:  This resting evaluation shows no evidence of significant lower extremity  arterial occlusive disease  Ankle/Brachial index: 0 97     , Prior 1 12  PVR/ PPG tracings are normal   Metatarsal pressure of  161mmHg, Prior 240 mmHg  Great toe pressure of  90mmHg, within the healing range, Prior 95mmHg           I have reviewed and made appropriate changes to the review of systems input by the medical assistant  Vitals:    01/03/20 1456   BP: 148/70   BP Location: Left arm   Patient Position: Sitting   Pulse: 68   Weight: 91 2 kg (201 lb)   Height: 5' 8" (1 727 m)       Patient Active Problem List   Diagnosis    Non-ST elevation (NSTEMI) myocardial infarction [facility])    Peripheral vascular disease (HCC)    Atrial fibrillation (HCC)    Recurrent major depressive disorder (HCC)    Acquired hypothyroidism    Pain in both feet    Peripheral arteriosclerosis (HCC)    Onychomycosis    Ingrown toenail    Paronychia of toenail    Macular degeneration    Cardiac resynchronization therapy pacemaker (CRT-P) in place       Past Surgical History:   Procedure Laterality Date    MOHS SURGERY  01/1998    shaving lesion, on face below left side of nose basal cell carcinoma       Family History   Problem Relation Age of Onset    Alzheimer's disease Mother     Parkinsonism Mother         symptomatic    Stroke Father     Vascular Disease Father     Heart disease Maternal Uncle         cardiac       Social History     Socioeconomic History    Marital status:       Spouse name: Not on file    Number of children: Not on file    Years of education: Not on file    Highest education level: Not on file   Occupational History    Not on file   Social Needs    Financial resource strain: Not on file    Food insecurity:     Worry: Not on file     Inability: Not on file    Transportation needs:     Medical: Not on file Non-medical: Not on file   Tobacco Use    Smoking status: Former Smoker     Last attempt to quit:      Years since quittin 0    Smokeless tobacco: Never Used   Substance and Sexual Activity    Alcohol use: No    Drug use: No    Sexual activity: Not on file   Lifestyle    Physical activity:     Days per week: Not on file     Minutes per session: Not on file    Stress: Not on file   Relationships    Social connections:     Talks on phone: Not on file     Gets together: Not on file     Attends Christianity service: Not on file     Active member of club or organization: Not on file     Attends meetings of clubs or organizations: Not on file     Relationship status: Not on file    Intimate partner violence:     Fear of current or ex partner: Not on file     Emotionally abused: Not on file     Physically abused: Not on file     Forced sexual activity: Not on file   Other Topics Concern    Not on file   Social History Narrative    Not on file       Allergies   Allergen Reactions    Other Other (See Comments)     Category: Adverse Reaction; Annotation - [id number]: HORSE SERUM - Pt states was used several years ago in tetanus boosters when she was a child           Current Outpatient Medications:     ALPRAZolam (XANAX) 0 5 mg tablet, Take 1 tablet (0 5 mg total) by mouth daily at bedtime as needed for anxiety, Disp: 90 tablet, Rfl: 0    Cinnamon 500 MG capsule, Take 500 mg by mouth, Disp: , Rfl:     Copper Gluconate (COPPER CAPS PO), Take by mouth, Disp: , Rfl:     DIOVAN 320 MG tablet, , Disp: , Rfl:     Glucosamine-Chondroitin (GLUCOSAMINE CHONDR COMPLEX PO), Take 2 tablets by mouth, Disp: , Rfl:     Glucosamine-MSM-Hyaluronic Acd (JOINT HEALTH PO), Take 2 tablets by mouth, Disp: , Rfl:     levothyroxine 112 mcg tablet, Take 1 tablet (112 mcg total) by mouth daily, Disp: 90 tablet, Rfl: 0    Magnesium 250 MG TABS, Take 1 tablet by mouth daily, Disp: , Rfl:     metoprolol succinate (TOPROL-XL) 50 mg 24 hr tablet, Take 50 mg by mouth 3 tablets in the am and 3 tablets in the pm, Disp: , Rfl:     Multiple Vitamins-Minerals (CENTRUM ULTRA WOMENS) TABS, Take 1 tablet by mouth, Disp: , Rfl:     Multiple Vitamins-Minerals (PRESERVISION AREDS 2+MULTI VIT PO), Take 2 tablets by mouth, Disp: , Rfl:     potassium chloride (KCl) 2 mEq/mL SOLN, Pt taking half tablet daily, Disp: , Rfl:     simvastatin (ZOCOR) 40 mg tablet, Take 40 mg by mouth, Disp: , Rfl:     VITAMIN D PO, Take by mouth, Disp: , Rfl:     warfarin (COUMADIN) 5 mg tablet, Take 1/2 to 1 tablet daily or as directed , Disp: , Rfl:     Zinc Sulfate (ZINC 15 PO), Take by mouth, Disp: , Rfl:     VITAMIN A PO, Take 1 tablet by mouth, Disp: , Rfl:

## 2020-01-05 NOTE — ASSESSMENT & PLAN NOTE
Peripheral arterial disease with non-lifestyle limiting claudication  Atrial fibrillation  Hypertension  Hyperlipidemia    -complete basic activities the comfortably without leg pain  No rest pain, night cramps or wounds     -complains of chronic R hip pain/aches with activity (claudication v orthopedic )    VAS BARBARA 5/23/17  R 0 42/95/28; significant LE arterial occlusive disease; > 75% CFA; < 50% PFA   L 0 97/161/90; no evidence of significant LE arterial disease    No recent labs  LDL 99 and A1C 6 (Nov '18)    Plan:  -Patient would like to continue with conservative measures and routine surveillance of peripheral arterial disease    -Continue a regular walking program to improve circulation   -Continue with healthy lifestyle choices - including: heart healthy cardiac diet, low sodium    -Non-smoker  -Continue with optimally tolerated medical therapy on warfarin and simvastatin 40   -LDL goal less than 70   -We reviewed vascular study reports of which I have access  -Check AOIL and BARBARA  -Follow up to review vascular studies   -We discussed reasons why she should be seen sooner and indications for which we may need to consider invasive procedures - increased leg pain, numbness, coldness, or wounds

## 2020-07-17 NOTE — PATIENT INSTRUCTIONS
Peripheral arterial disease with claudication  Coronary artery disease  Atrial fibrillation    -Doing well; no leg pain with regular activity  -L great toe fungus, sl ingrown toe nail and superficial friction wound to distal toe  -Non-smoker since 1988      Plan:  - protect feet with good shoes   - check feet daily for wounds  - Bacitracin to toe; consider topical toe antifungal; follow up with podiatry  - Try to walk every day building up to 30 minutes a day  You can stop as needed  - Contact GYN ([facility] [place])    - Continue with warfarin and simvastatin 40  - check lower extremity arterial duplex in 1 year with office, or sooner if needed          Peripheral Artery Disease   AMBULATORY CARE:   Peripheral artery disease (PAD)  is narrow, weak, or blocked arteries  It may affect any arteries outside of your heart and brain  PAD is usually the result of a buildup of fat and cholesterol, also called plaque, along your artery walls  Inflammation, a blood clot, or abnormal cell growth could also block your arteries  PAD prevents normal blood flow to your legs and arms  You are at risk of an amputation if poor blood flow keeps wounds from healing or causes gangrene (tissue death)  Without treatment, PAD can also cause a heart attack or stroke  Common symptoms include:  Mild PAD usually does not cause symptoms   As the disease worsens over time, you may have the following:  · Pain or cramps in your leg or hip while you walk     · A numb, weak, or heavy feeling in your legs     · Dry, scaly, red, or pale skin on your legs     · Thick or brittle nails, or hair loss on your arms and legs     · Foot sores that will not heal     · Burning or aching in your feet and toes while resting (this may be worse when you lie down)  Call 911 for the following:   · You have any of the following signs of a heart attack:      ¨ Squeezing, pressure, or pain in your chest that lasts longer than 5 minutes or returns    ¨ Discomfort or pain in your back, neck, jaw, stomach, or arm     ¨ Trouble breathing    ¨ Nausea or vomiting    ¨ Lightheadedness or a sudden cold sweat, especially with chest pain or trouble breathing    · You have any of the following signs of a stroke:      ¨ Numbness or drooping on one side of your face     ¨ Weakness in an arm or leg    ¨ Confusion or difficulty speaking    ¨ Dizziness, a severe headache, or vision loss  Seek care immediately if:   · You have sores or wounds that will not heal      · You notice black or discolored skin on your arm or leg  · Your skin is cool to the touch  Contact your healthcare provider if:   · You have leg pain when you walk 1/8 mile (200 meters) or less, even with treatment  · Your legs are red, dry, or pale, even with treatment  · You have questions or concerns about your condition or care  Treatment for PAD  can help reduce your risk of a heart attack, stroke, or amputation  You may need more than one of the following:  · Medicines  may be given to prevent blood clots and reduce the risk of a heart attack or stroke  You may be given medicine to help prevent your PAD from getting worse  · A supervised exercise program  helps you stay active in normal daily activities and may prevent disability  Healthcare providers will help you safely walk or do strength training exercises 3 times a week for 30 to 60 minutes  You will do this for several months, then transition to walking on your own  · Angioplasty  is a procedure to open your artery so blood can flow through normally  A thin tube called a catheter is used to insert a small balloon into your artery  The balloon is inflated to open your blocked artery, and then removed  A tube called a stent may be placed in your artery to hold it open  · Bypass surgery  is used to make a new connection to your artery with a vein from another part of your body, or an artificial graft   The vein or graft is attached to your artery above and below your blockage  This allows blood to flow around the blocked portion of your artery  Manage and prevent PAD:   · Walk for 30 to 60 minutes at least 4 times a week  Your healthcare provider may also refer you to an supervised exercise program  The program helps increase how far you can walk without pain  It also helps you stay active in normal daily activities and may prevent disability caused by PAD  · Do not smoke  Nicotine and other chemicals in cigarettes and cigars can worsen PAD  They can also increase your risk for a heart attack or stroke  Ask your healthcare provider for information if you currently smoke and need help to quit  E-cigarettes or smokeless tobacco still contain nicotine  Talk to your healthcare provider before you use these products  · Manage other health conditions  Take your medicines as directed and follow your healthcare provider's instructions if you have high blood pressure or high cholesterol  Perform foot care and check your blood sugar levels as directed if you have diabetes  · Eat heart healthy foods  Eat whole grains, fruits, and vegetables every day  Limit salt and high-fat foods  Ask your healthcare provider for more information on a heart healthy diet  Ask if you need to lose weight  Your healthcare provider can help you create a healthy weight-loss plan  Follow up with your healthcare provider as directed:  Write down your questions so you remember to ask them during your visits  © 2017 [employer]  Information is for End User's use only and may not be sold, redistributed or otherwise used for commercial purposes  All illustrations and images included in CareNotes® are the copyrighted property of [employer]  or Reginaldo Velasquez  The above information is an  only  It is not intended as medical advice for individual conditions or treatments   Talk to your doctor, nurse or pharmacist before following any medical regimen to see if it is safe and effective for you

## 2020-07-17 NOTE — LETTER
July 21, 2020     [address]  [address] [facility] [address]    Patient: Kinga Pan   YOB: 1935   Date of Visit: 7/17/2020     Dear Dr Annie Tatum      Thank you for referring Francisco Javier Troncoso to me for evaluation  Below are the relevant portions of my assessment and plan of care  If you have questions, please do not hesitate to call me  I look forward to following Jo [place]ral along with you  Sincerely,        Domingo Goff PA-C        CC: No Recipients    Progress Notes:      No easily palp pulses    R foot monophasic PT/mid DP   No easily found AT or distal DP at toe    L foot good, easily found AT/DP/PT signals present

## 2020-07-17 NOTE — PROGRESS NOTES
Assessment/Plan:    Peripheral arterial disease with claudication  Coronary artery disease  Atrial fibrillation on warfarin  Diabetes  Lumbosacral radiculopathy due to OA of the spine    Discussion:  81 y/o F with long standing mixed peripheral arterial disease and DDD  She has nondisabling R leg claudication  She has no wounds or rest pain  We reviewed her lower extremity venous duplex study  The R BELLA is low and it falls into the severe claudication range but reviewing studies going back for the past few years there is no significant change in the BELLA  BARBARA 5/20/20  R 0 41/95/35; > 75% CFA, monophasic at ankle  L 0 95/149/75; no evidence of significant LE arterial occlusive disease    It is recommended that she continue to work on a regular progressive walking program  We discussed trying to build up to waking 30 minutes every day in order to improve circulation  She can stop as needed during her exercise  She should continue with warfarin, statin and ARB therapy which will help her peripheral arterial disease as well as cardiovascular disease  We will continue with routine, annual doppler surveillance and routine clinical monitoring to monitor for progression of disease  She should monitor her condition and return sooner, if needed  - Try to walk every day building up to 30 minutes a day  - protect feet with good shoes; monitor feet for wounds  - Bacitracin to toe; consider topical toe antifungal; follow up with podiatry  - Continue with warfarin and simvastatin 40  - check lower extremity arterial duplex in 1 year with office, or sooner if needed    - Contact GYN ([facility] [place])      Subjective:      Patient ID: Madhu Munson is a 80 y o  female  Patient presents in office today to review AOIL/BARBARA done 5/20  Patient reports R hip aching type pain w/ walking which resolves quickly w/ rest      NIRAJ Crawley 80 y  o DM, Htn, HLD, PAF, CKD, PPM/CRT referred for evaluation of peripheral arterial disease  Patient reports that she is not very active but she is able to complete most basic activities without leg pain  When she exerts herself "too much," she develops R hip pain which resolves with rest  She reports that R hip pain is chronic and it has not been worsening  She denies thigh and calf claudication at her level of activity  She has no rest pain or wounds  She does not recall any recent vascular studies  She is a former patient of Dr Tyler Dolan and she receives cardiac care at [address] [place] [address] [address]  She is on Simvastatin and warfarin  An echo in 2019, showed EF 60%  She has been a non-smoker since 1988 7/17/2020: Patient presents for 6 month follow-up  She reports that she is walking and completing ADL comfortably without leg pain  She developed a slight ingrown toe nail and has a L great toe fungus but otherwise no open wounds  We reviewed her recent BARBARA from May '20 which shows significant RIGHT lower extremity PAD with low BELLA 0 41 with > 75% CFA stenosis  The LEFT lower extremity is normal  The study is without change from 2017  VAS AOIL 5/20/2020  Abdominal aorta patent  Bilateral SAMARA, EIA are patent   Celiac, SMA and renals are patent    VAS BARBARA 5/20/2020  FINDINGS:     Segment                Right                        Left                                            Impression  PSV (cm/s)  EDV  PSV (cm/s)  EDV    Common Femoral Artery  >75%               438   71         149         Prox Profunda                              86   21          80         Prox SFA                                   75   19          91    3    Mid SFA                                    73   19         114         Dist SFA                                   55   12         141         Proximal Pop                               36   11          86         Distal Pop                                 42    9          57         Tibioperoneal                              39    6         110 Dist Post Tibial                           46    9          77         Dist  Ant  Tibial                          16               46         Dist Peroneal                              15               73                  CONCLUSION:     Impression:RIGHT LOWER LIMB:  Evaluation shows a >75% stenosis in the common femoral artery  Retrograde flow noted in the deep femoral artery  Ankle/Brachial index:  0 41, which is within the ischemic range  Prior 0 42  PVR/ PPG tracings are dampened  Monophasic waveforms at the ankle  Metatarsal pressure of  95 mmHg  Great toe pressure of  35 mmHg, within the healing range  LEFT LOWER LIMB:  This resting evaluation shows no evidence of significant lower extremity  arterial occlusive disease  Ankle/Brachial index: 0 95, which is within the normal range  Prior 0 97  PVR/ PPG tracings are normal   Metatarsal pressure of 149 mmHg  Great toe pressure of 75 mmHg, within the healing range  Compared to previous study on 5/23/2017, there is no significant change  Recommend repeat testing in 1year as per protocol unless otherwise indicated  The following portions of the patient's history were reviewed and updated as appropriate: allergies, current medications, past family history, past medical history, past social history, past surgical history and problem list     - vaginal lesion/ lump?; recommend calling Dr Alisia Valdovinos /Dr Russell to evaluate or refer to GYN as she has not seen one in 30 years  Review of Systems   Constitutional: Negative  HENT: Negative  Eyes: Negative  Respiratory: Negative  Cardiovascular: Negative  Gastrointestinal: Negative  Endocrine: Negative  Genitourinary: Negative  Musculoskeletal: Positive for gait problem  Leg pain   Skin: Negative  Allergic/Immunologic: Negative  Hematological: Negative  Psychiatric/Behavioral: Negative            Objective:    /76 (BP Location: Right arm, Patient Position: Sitting)   Pulse 60   Temp 97 9 °F (36 6 °C) (Tympanic)   Ht 5' 8" (1 727 m)   Wt 86 2 kg (190 lb)   BMI 28 89 kg/m²          Physical Exam   Constitutional: She is oriented to person, place, and time  She appears well-developed and well-nourished  She is cooperative  HENT:   Head: Normocephalic and atraumatic  Eyes: Pupils are equal, round, and reactive to light  EOM are normal    Neck: Trachea normal  Neck supple  No JVD present  No thyromegaly present  Cardiovascular: Normal rate, regular rhythm, S1 normal and S2 normal  Exam reveals no gallop and no friction rub  Murmur: soft sm  Pulses:       Carotid pulses are 2+ on the right side, and 2+ on the left side  Radial pulses are 2+ on the right side, and 2+ on the left side  Dorsalis pedis pulses are 0 on the right side, and 0 on the left side  Posterior tibial pulses are 0 on the right side, and 0 on the left side  No easily palp pulses    R foot monophasic PT/mid DP  No easily found AT or distal DP at toe    L foot good, easily found AT/DP/PT signals present        Pulmonary/Chest: Effort normal and breath sounds normal  No accessory muscle usage  No respiratory distress  She has no wheezes  She has no rales  Abdominal: Soft  Bowel sounds are normal  She exhibits no distension  There is no hepatosplenomegaly  There is no tenderness  Musculoskeletal: Normal range of motion  She exhibits edema (trace)  She exhibits no deformity  Neurological: She is alert and oriented to person, place, and time  Grossly normal    Skin: Skin is warm and dry  No lesion and no rash noted  No cyanosis  Nails show no clubbing  Psychiatric: She has a normal mood and affect  Nursing note and vitals reviewed  I have reviewed and made appropriate changes to the review of systems input by the medical assistant      Vitals:    07/17/20 1003   BP: 124/76   BP Location: Right arm   Patient Position: Sitting   Pulse: 60   Temp: 97 9 °F (36 6 °C)   TempSrc: Tympanic   Weight: 86 2 kg (190 lb)   Height: 5' 8" (1 727 m)       Patient Active Problem List   Diagnosis    Non-ST elevation (NSTEMI) myocardial infarction [facility])    Peripheral vascular disease (HCC)    Atrial fibrillation (HCC)    Recurrent major depressive disorder (HCC)    Acquired hypothyroidism    Pain in both feet    Peripheral arteriosclerosis (HCC)    Onychomycosis    Ingrown toenail    Paronychia of toenail    Macular degeneration    Cardiac resynchronization therapy pacemaker (CRT-P) in place       Past Surgical History:   Procedure Laterality Date    MOHS SURGERY  1998    shaving lesion, on face below left side of nose basal cell carcinoma       Family History   Problem Relation Age of Onset    Alzheimer's disease Mother     Parkinsonism Mother         symptomatic    Stroke Father     Vascular Disease Father     Heart disease Maternal Uncle         cardiac       Social History     Socioeconomic History    Marital status:       Spouse name: Not on file    Number of children: Not on file    Years of education: Not on file    Highest education level: Not on file   Occupational History    Not on file   Social Needs    Financial resource strain: Not on file    Food insecurity:     Worry: Not on file     Inability: Not on file    Transportation needs:     Medical: Not on file     Non-medical: Not on file   Tobacco Use    Smoking status: Former Smoker     Last attempt to quit:      Years since quittin 5    Smokeless tobacco: Never Used   Substance and Sexual Activity    Alcohol use: No    Drug use: No    Sexual activity: Not on file   Lifestyle    Physical activity:     Days per week: Not on file     Minutes per session: Not on file    Stress: Not on file   Relationships    Social connections:     Talks on phone: Not on file     Gets together: Not on file     Attends Christian service: Not on file     Active member of club or organization: Not on file     Attends meetings of clubs or organizations: Not on file     Relationship status: Not on file    Intimate partner violence:     Fear of current or ex partner: Not on file     Emotionally abused: Not on file     Physically abused: Not on file     Forced sexual activity: Not on file   Other Topics Concern    Not on file   Social History Narrative    Not on file       Allergies   Allergen Reactions    Other Other (See Comments)     Category: Adverse Reaction; Annotation - [id number]: HORSE SERUM - Pt states was used several years ago in tetanus boosters when she was a child           Current Outpatient Medications:     ALPRAZolam (XANAX) 0 5 mg tablet, Take 1 tablet (0 5 mg total) by mouth daily at bedtime as needed for anxiety, Disp: 90 tablet, Rfl: 0    Cinnamon 500 MG capsule, Take 500 mg by mouth, Disp: , Rfl:     DIOVAN 320 MG tablet, , Disp: , Rfl:     Glucosamine-Chondroitin (GLUCOSAMINE CHONDR COMPLEX PO), Take 2 tablets by mouth, Disp: , Rfl:     Glucosamine-MSM-Hyaluronic Acd (JOINT HEALTH PO), Take 2 tablets by mouth, Disp: , Rfl:     Magnesium 250 MG TABS, Take 1 tablet by mouth daily, Disp: , Rfl:     metoprolol succinate (TOPROL-XL) 50 mg 24 hr tablet, Take 50 mg by mouth 3 tablets in the am and 3 tablets in the pm, Disp: , Rfl:     Multiple Vitamins-Minerals (CENTRUM ULTRA WOMENS) TABS, Take 1 tablet by mouth, Disp: , Rfl:     Multiple Vitamins-Minerals (PRESERVISION AREDS 2+MULTI VIT PO), Take 2 tablets by mouth, Disp: , Rfl:     potassium chloride (KCl) 2 mEq/mL SOLN, Pt taking half tablet daily, Disp: , Rfl:     simvastatin (ZOCOR) 40 mg tablet, Take 40 mg by mouth, Disp: , Rfl:     VITAMIN D PO, Take by mouth, Disp: , Rfl:     warfarin (COUMADIN) 5 mg tablet, Take 1/2 to 1 tablet daily or as directed , Disp: , Rfl:     Zinc Sulfate (ZINC 15 PO), Take by mouth, Disp: , Rfl:     Copper Gluconate (COPPER CAPS PO), Take by mouth, Disp: , Rfl:    levothyroxine 112 mcg tablet, take 1 tablet by mouth once daily, Disp: 90 tablet, Rfl: 0    VITAMIN A PO, Take 1 tablet by mouth, Disp: , Rfl:

## 2020-08-03 NOTE — TELEPHONE ENCOUNTER
It looks as though her insurance company, [employer], is requesting refill of her levothyroxine to [facility]

## 2020-11-24 PROBLEM — E11.51 TYPE 2 DIABETES MELLITUS WITH DIABETIC PERIPHERAL ANGIOPATHY WITHOUT GANGRENE (HCC): Status: ACTIVE | Noted: 2020-01-01

## 2020-11-24 PROBLEM — N18.2 CHRONIC KIDNEY DISEASE, STAGE 2 (MILD): Status: ACTIVE | Noted: 2020-01-01

## 2020-11-24 PROBLEM — E11.22 TYPE 2 DIABETES MELLITUS WITH DIABETIC CHRONIC KIDNEY DISEASE (HCC): Status: ACTIVE | Noted: 2020-01-01

## 2021-01-01 ENCOUNTER — PREP FOR PROCEDURE (OUTPATIENT)
Dept: GYNECOLOGIC ONCOLOGY | Facility: CLINIC | Age: 86
End: 2021-01-01

## 2021-01-01 ENCOUNTER — APPOINTMENT (INPATIENT)
Dept: NON INVASIVE DIAGNOSTICS | Facility: HOSPITAL | Age: 86
DRG: 683 | End: 2021-01-01
Payer: COMMERCIAL

## 2021-01-01 ENCOUNTER — APPOINTMENT (INPATIENT)
Dept: RADIOLOGY | Facility: HOSPITAL | Age: 86
DRG: 683 | End: 2021-01-01
Payer: COMMERCIAL

## 2021-01-01 ENCOUNTER — APPOINTMENT (EMERGENCY)
Dept: RADIOLOGY | Facility: HOSPITAL | Age: 86
DRG: 683 | End: 2021-01-01
Payer: COMMERCIAL

## 2021-01-01 ENCOUNTER — HOSPITAL ENCOUNTER (INPATIENT)
Facility: HOSPITAL | Age: 86
LOS: 8 days | DRG: 683 | End: 2021-02-09
Attending: EMERGENCY MEDICINE | Admitting: FAMILY MEDICINE
Payer: COMMERCIAL

## 2021-01-01 ENCOUNTER — TELEPHONE (OUTPATIENT)
Dept: HEMATOLOGY ONCOLOGY | Facility: CLINIC | Age: 86
End: 2021-01-01

## 2021-01-01 ENCOUNTER — HOSPITAL ENCOUNTER (OUTPATIENT)
Facility: HOSPITAL | Age: 86
End: 2021-02-12
Attending: FAMILY MEDICINE | Admitting: FAMILY MEDICINE

## 2021-01-01 ENCOUNTER — TELEPHONE (OUTPATIENT)
Dept: OTHER | Facility: OTHER | Age: 86
End: 2021-01-01

## 2021-01-01 ENCOUNTER — OFFICE VISIT (OUTPATIENT)
Dept: OBGYN CLINIC | Facility: CLINIC | Age: 86
End: 2021-01-01
Payer: COMMERCIAL

## 2021-01-01 VITALS
DIASTOLIC BLOOD PRESSURE: 44 MMHG | SYSTOLIC BLOOD PRESSURE: 94 MMHG | HEART RATE: 80 BPM | BODY MASS INDEX: 26.51 KG/M2 | OXYGEN SATURATION: 94 % | RESPIRATION RATE: 18 BRPM | TEMPERATURE: 97.6 F | HEIGHT: 68 IN | WEIGHT: 174.9 LBS

## 2021-01-01 VITALS
BODY MASS INDEX: 27.28 KG/M2 | SYSTOLIC BLOOD PRESSURE: 108 MMHG | WEIGHT: 180 LBS | HEIGHT: 68 IN | DIASTOLIC BLOOD PRESSURE: 68 MMHG

## 2021-01-01 VITALS
DIASTOLIC BLOOD PRESSURE: 54 MMHG | OXYGEN SATURATION: 96 % | WEIGHT: 175 LBS | TEMPERATURE: 97.5 F | RESPIRATION RATE: 17 BRPM | HEART RATE: 99 BPM | BODY MASS INDEX: 26.52 KG/M2 | HEIGHT: 68 IN | SYSTOLIC BLOOD PRESSURE: 96 MMHG

## 2021-01-01 DIAGNOSIS — N90.89 VULVAR MASS: Primary | ICD-10-CM

## 2021-01-01 DIAGNOSIS — C51.9 VULVAR CANCER (HCC): Primary | ICD-10-CM

## 2021-01-01 DIAGNOSIS — B35.1 ONYCHOMYCOSIS: ICD-10-CM

## 2021-01-01 DIAGNOSIS — E03.9 ACQUIRED HYPOTHYROIDISM: ICD-10-CM

## 2021-01-01 DIAGNOSIS — L60.0 INGROWN TOENAIL: ICD-10-CM

## 2021-01-01 DIAGNOSIS — Z95.0 CARDIAC RESYNCHRONIZATION THERAPY PACEMAKER (CRT-P) IN PLACE: ICD-10-CM

## 2021-01-01 DIAGNOSIS — I48.0 PAROXYSMAL ATRIAL FIBRILLATION (HCC): ICD-10-CM

## 2021-01-01 DIAGNOSIS — E11.51 TYPE 2 DIABETES MELLITUS WITH DIABETIC PERIPHERAL ANGIOPATHY WITHOUT GANGRENE, WITHOUT LONG-TERM CURRENT USE OF INSULIN (HCC): ICD-10-CM

## 2021-01-01 DIAGNOSIS — R53.1 GENERALIZED WEAKNESS: Primary | ICD-10-CM

## 2021-01-01 DIAGNOSIS — N90.3: ICD-10-CM

## 2021-01-01 DIAGNOSIS — N18.9 ACUTE ON CHRONIC KIDNEY FAILURE (HCC): ICD-10-CM

## 2021-01-01 DIAGNOSIS — N17.9 ACUTE ON CHRONIC KIDNEY FAILURE (HCC): ICD-10-CM

## 2021-01-01 DIAGNOSIS — K57.92 DIVERTICULITIS: ICD-10-CM

## 2021-01-01 DIAGNOSIS — N39.0 UTI (URINARY TRACT INFECTION): ICD-10-CM

## 2021-01-01 DIAGNOSIS — I70.209 PERIPHERAL ARTERIOSCLEROSIS (HCC): ICD-10-CM

## 2021-01-01 DIAGNOSIS — M25.561 ACUTE PAIN OF RIGHT KNEE: ICD-10-CM

## 2021-01-01 LAB
ABO GROUP BLD BPU: NORMAL
ABO GROUP BLD BPU: NORMAL
ABO GROUP BLD: NORMAL
ABO GROUP BLD: NORMAL
ALBUMIN SERPL BCP-MCNC: 1.5 G/DL (ref 3.5–5)
ALBUMIN SERPL BCP-MCNC: 1.6 G/DL (ref 3.5–5)
ALBUMIN SERPL BCP-MCNC: 1.7 G/DL (ref 3.5–5)
ALBUMIN SERPL BCP-MCNC: 1.7 G/DL (ref 3.5–5)
ALBUMIN SERPL BCP-MCNC: 1.8 G/DL (ref 3.5–5)
ALBUMIN SERPL BCP-MCNC: 2 G/DL (ref 3.5–5)
ALBUMIN SERPL BCP-MCNC: 2 G/DL (ref 3.5–5)
ALBUMIN SERPL BCP-MCNC: 2.4 G/DL (ref 3.5–5)
ALP SERPL-CCNC: 102 U/L (ref 46–116)
ALP SERPL-CCNC: 72 U/L (ref 46–116)
ALP SERPL-CCNC: 74 U/L (ref 46–116)
ALP SERPL-CCNC: 82 U/L (ref 46–116)
ALP SERPL-CCNC: 83 U/L (ref 46–116)
ALP SERPL-CCNC: 88 U/L (ref 46–116)
ALP SERPL-CCNC: 92 U/L (ref 46–116)
ALP SERPL-CCNC: 94 U/L (ref 46–116)
ALT SERPL W P-5'-P-CCNC: 14 U/L (ref 12–78)
ALT SERPL W P-5'-P-CCNC: 14 U/L (ref 12–78)
ALT SERPL W P-5'-P-CCNC: 15 U/L (ref 12–78)
ALT SERPL W P-5'-P-CCNC: 16 U/L (ref 12–78)
ALT SERPL W P-5'-P-CCNC: 19 U/L (ref 12–78)
ALT SERPL W P-5'-P-CCNC: 20 U/L (ref 12–78)
ALT SERPL W P-5'-P-CCNC: 21 U/L (ref 12–78)
ALT SERPL W P-5'-P-CCNC: 21 U/L (ref 12–78)
AMORPH URATE CRY URNS QL MICRO: ABNORMAL /HPF
ANION GAP SERPL CALCULATED.3IONS-SCNC: 11 MMOL/L (ref 4–13)
ANION GAP SERPL CALCULATED.3IONS-SCNC: 13 MMOL/L (ref 4–13)
ANION GAP SERPL CALCULATED.3IONS-SCNC: 5 MMOL/L (ref 4–13)
ANION GAP SERPL CALCULATED.3IONS-SCNC: 7 MMOL/L (ref 4–13)
ANION GAP SERPL CALCULATED.3IONS-SCNC: 8 MMOL/L (ref 4–13)
ANION GAP SERPL CALCULATED.3IONS-SCNC: 8 MMOL/L (ref 4–13)
APTT PPP: 33 SECONDS (ref 23–37)
AST SERPL W P-5'-P-CCNC: 14 U/L (ref 5–45)
AST SERPL W P-5'-P-CCNC: 16 U/L (ref 5–45)
AST SERPL W P-5'-P-CCNC: 19 U/L (ref 5–45)
AST SERPL W P-5'-P-CCNC: 25 U/L (ref 5–45)
AST SERPL W P-5'-P-CCNC: 27 U/L (ref 5–45)
AST SERPL W P-5'-P-CCNC: 34 U/L (ref 5–45)
AST SERPL W P-5'-P-CCNC: 46 U/L (ref 5–45)
AST SERPL W P-5'-P-CCNC: 49 U/L (ref 5–45)
ATRIAL RATE: 394 BPM
BACTERIA BLD CULT: NORMAL
BACTERIA BLD CULT: NORMAL
BACTERIA UR CULT: ABNORMAL
BACTERIA UR CULT: ABNORMAL
BACTERIA UR QL AUTO: ABNORMAL /HPF
BACTERIA UR QL AUTO: ABNORMAL /HPF
BASOPHILS # BLD AUTO: 0.1 THOUSANDS/ΜL (ref 0–0.1)
BASOPHILS # BLD AUTO: 0.1 THOUSANDS/ΜL (ref 0–0.1)
BASOPHILS # BLD AUTO: 0.11 THOUSANDS/ΜL (ref 0–0.1)
BASOPHILS # BLD AUTO: 0.12 THOUSANDS/ΜL (ref 0–0.1)
BASOPHILS # BLD AUTO: 0.13 THOUSANDS/ΜL (ref 0–0.1)
BASOPHILS # BLD AUTO: 0.15 THOUSANDS/ΜL (ref 0–0.1)
BASOPHILS # BLD AUTO: 0.17 THOUSANDS/ΜL (ref 0–0.1)
BASOPHILS NFR BLD AUTO: 0 % (ref 0–1)
BASOPHILS NFR BLD AUTO: 1 % (ref 0–1)
BILIRUB SERPL-MCNC: 0.2 MG/DL (ref 0.2–1)
BILIRUB SERPL-MCNC: 0.2 MG/DL (ref 0.2–1)
BILIRUB SERPL-MCNC: 0.3 MG/DL (ref 0.2–1)
BILIRUB SERPL-MCNC: 0.3 MG/DL (ref 0.2–1)
BILIRUB SERPL-MCNC: 0.5 MG/DL (ref 0.2–1)
BILIRUB SERPL-MCNC: 0.6 MG/DL (ref 0.2–1)
BILIRUB UR QL STRIP: ABNORMAL
BILIRUB UR QL STRIP: NEGATIVE
BLD GP AB SCN SERPL QL: POSITIVE
BLOOD GROUP ANTIBODIES SERPL: NORMAL
BLOOD GROUP ANTIBODIES SERPL: NORMAL
BPU ID: NORMAL
BPU ID: NORMAL
BUN SERPL-MCNC: 17 MG/DL (ref 5–25)
BUN SERPL-MCNC: 20 MG/DL (ref 5–25)
BUN SERPL-MCNC: 21 MG/DL (ref 5–25)
BUN SERPL-MCNC: 22 MG/DL (ref 5–25)
BUN SERPL-MCNC: 46 MG/DL (ref 5–25)
BUN SERPL-MCNC: 71 MG/DL (ref 5–25)
BUN SERPL-MCNC: 86 MG/DL (ref 5–25)
BUN SERPL-MCNC: 89 MG/DL (ref 5–25)
C AG RBC QL: NEGATIVE
C AG RBC QL: NEGATIVE
CALCIUM ALBUM COR SERPL-MCNC: 10.4 MG/DL (ref 8.3–10.1)
CALCIUM ALBUM COR SERPL-MCNC: 10.5 MG/DL (ref 8.3–10.1)
CALCIUM ALBUM COR SERPL-MCNC: 10.9 MG/DL (ref 8.3–10.1)
CALCIUM ALBUM COR SERPL-MCNC: 10.9 MG/DL (ref 8.3–10.1)
CALCIUM ALBUM COR SERPL-MCNC: 11 MG/DL (ref 8.3–10.1)
CALCIUM ALBUM COR SERPL-MCNC: 11.2 MG/DL (ref 8.3–10.1)
CALCIUM ALBUM COR SERPL-MCNC: 11.4 MG/DL (ref 8.3–10.1)
CALCIUM ALBUM COR SERPL-MCNC: 11.6 MG/DL (ref 8.3–10.1)
CALCIUM SERPL-MCNC: 10.3 MG/DL (ref 8.3–10.1)
CALCIUM SERPL-MCNC: 8.5 MG/DL (ref 8.3–10.1)
CALCIUM SERPL-MCNC: 8.6 MG/DL (ref 8.3–10.1)
CALCIUM SERPL-MCNC: 9 MG/DL (ref 8.3–10.1)
CALCIUM SERPL-MCNC: 9.3 MG/DL (ref 8.3–10.1)
CALCIUM SERPL-MCNC: 9.4 MG/DL (ref 8.3–10.1)
CALCIUM SERPL-MCNC: 9.4 MG/DL (ref 8.3–10.1)
CALCIUM SERPL-MCNC: 9.6 MG/DL (ref 8.3–10.1)
CHLORIDE SERPL-SCNC: 100 MMOL/L (ref 100–108)
CHLORIDE SERPL-SCNC: 100 MMOL/L (ref 100–108)
CHLORIDE SERPL-SCNC: 101 MMOL/L (ref 100–108)
CHLORIDE SERPL-SCNC: 101 MMOL/L (ref 100–108)
CHLORIDE SERPL-SCNC: 102 MMOL/L (ref 100–108)
CHLORIDE SERPL-SCNC: 103 MMOL/L (ref 100–108)
CHLORIDE SERPL-SCNC: 105 MMOL/L (ref 100–108)
CHLORIDE SERPL-SCNC: 97 MMOL/L (ref 100–108)
CHOLEST SERPL-MCNC: 74 MG/DL (ref 50–200)
CLARITY UR: ABNORMAL
CLARITY UR: ABNORMAL
CO2 SERPL-SCNC: 20 MMOL/L (ref 21–32)
CO2 SERPL-SCNC: 21 MMOL/L (ref 21–32)
CO2 SERPL-SCNC: 22 MMOL/L (ref 21–32)
CO2 SERPL-SCNC: 24 MMOL/L (ref 21–32)
CO2 SERPL-SCNC: 24 MMOL/L (ref 21–32)
CO2 SERPL-SCNC: 25 MMOL/L (ref 21–32)
CO2 SERPL-SCNC: 25 MMOL/L (ref 21–32)
CO2 SERPL-SCNC: 26 MMOL/L (ref 21–32)
COLOR UR: ABNORMAL
COLOR UR: ABNORMAL
CREAT SERPL-MCNC: 1.25 MG/DL (ref 0.6–1.3)
CREAT SERPL-MCNC: 1.33 MG/DL (ref 0.6–1.3)
CREAT SERPL-MCNC: 1.39 MG/DL (ref 0.6–1.3)
CREAT SERPL-MCNC: 1.7 MG/DL (ref 0.6–1.3)
CREAT SERPL-MCNC: 2.23 MG/DL (ref 0.6–1.3)
CREAT SERPL-MCNC: 2.49 MG/DL (ref 0.6–1.3)
CREAT SERPL-MCNC: 3.1 MG/DL (ref 0.6–1.3)
CREAT SERPL-MCNC: 3.22 MG/DL (ref 0.6–1.3)
CROSSMATCH: NORMAL
CROSSMATCH: NORMAL
CRP SERPL QL: 165.5 MG/L
EOSINOPHIL # BLD AUTO: 0.03 THOUSAND/ΜL (ref 0–0.61)
EOSINOPHIL # BLD AUTO: 0.05 THOUSAND/ΜL (ref 0–0.61)
EOSINOPHIL # BLD AUTO: 0.23 THOUSAND/ΜL (ref 0–0.61)
EOSINOPHIL # BLD AUTO: 0.28 THOUSAND/ΜL (ref 0–0.61)
EOSINOPHIL # BLD AUTO: 0.37 THOUSAND/ΜL (ref 0–0.61)
EOSINOPHIL # BLD AUTO: 0.47 THOUSAND/ΜL (ref 0–0.61)
EOSINOPHIL # BLD AUTO: 0.54 THOUSAND/ΜL (ref 0–0.61)
EOSINOPHIL # BLD AUTO: 0.69 THOUSAND/ΜL (ref 0–0.61)
EOSINOPHIL # BLD AUTO: 0.73 THOUSAND/ΜL (ref 0–0.61)
EOSINOPHIL NFR BLD AUTO: 0 % (ref 0–6)
EOSINOPHIL NFR BLD AUTO: 0 % (ref 0–6)
EOSINOPHIL NFR BLD AUTO: 1 % (ref 0–6)
EOSINOPHIL NFR BLD AUTO: 2 % (ref 0–6)
EOSINOPHIL NFR BLD AUTO: 3 % (ref 0–6)
EOSINOPHIL NFR BLD AUTO: 3 % (ref 0–6)
ERYTHROCYTE [DISTWIDTH] IN BLOOD BY AUTOMATED COUNT: 14.2 % (ref 11.6–15.1)
ERYTHROCYTE [DISTWIDTH] IN BLOOD BY AUTOMATED COUNT: 14.3 % (ref 11.6–15.1)
ERYTHROCYTE [DISTWIDTH] IN BLOOD BY AUTOMATED COUNT: 14.5 % (ref 11.6–15.1)
ERYTHROCYTE [DISTWIDTH] IN BLOOD BY AUTOMATED COUNT: 14.7 % (ref 11.6–15.1)
ERYTHROCYTE [DISTWIDTH] IN BLOOD BY AUTOMATED COUNT: 15 % (ref 11.6–15.1)
ERYTHROCYTE [SEDIMENTATION RATE] IN BLOOD: 39 MM/HOUR (ref 0–29)
GFR SERPL CREATININE-BSD FRML MDRD: 13 ML/MIN/1.73SQ M
GFR SERPL CREATININE-BSD FRML MDRD: 13 ML/MIN/1.73SQ M
GFR SERPL CREATININE-BSD FRML MDRD: 17 ML/MIN/1.73SQ M
GFR SERPL CREATININE-BSD FRML MDRD: 20 ML/MIN/1.73SQ M
GFR SERPL CREATININE-BSD FRML MDRD: 27 ML/MIN/1.73SQ M
GFR SERPL CREATININE-BSD FRML MDRD: 35 ML/MIN/1.73SQ M
GFR SERPL CREATININE-BSD FRML MDRD: 36 ML/MIN/1.73SQ M
GFR SERPL CREATININE-BSD FRML MDRD: 39 ML/MIN/1.73SQ M
GLUCOSE SERPL-MCNC: 101 MG/DL (ref 65–140)
GLUCOSE SERPL-MCNC: 103 MG/DL (ref 65–140)
GLUCOSE SERPL-MCNC: 103 MG/DL (ref 65–140)
GLUCOSE SERPL-MCNC: 104 MG/DL (ref 65–140)
GLUCOSE SERPL-MCNC: 105 MG/DL (ref 65–140)
GLUCOSE SERPL-MCNC: 106 MG/DL (ref 65–140)
GLUCOSE SERPL-MCNC: 106 MG/DL (ref 65–140)
GLUCOSE SERPL-MCNC: 107 MG/DL (ref 65–140)
GLUCOSE SERPL-MCNC: 108 MG/DL (ref 65–140)
GLUCOSE SERPL-MCNC: 109 MG/DL (ref 65–140)
GLUCOSE SERPL-MCNC: 110 MG/DL (ref 65–140)
GLUCOSE SERPL-MCNC: 111 MG/DL (ref 65–140)
GLUCOSE SERPL-MCNC: 112 MG/DL (ref 65–140)
GLUCOSE SERPL-MCNC: 113 MG/DL (ref 65–140)
GLUCOSE SERPL-MCNC: 116 MG/DL (ref 65–140)
GLUCOSE SERPL-MCNC: 118 MG/DL (ref 65–140)
GLUCOSE SERPL-MCNC: 122 MG/DL (ref 65–140)
GLUCOSE SERPL-MCNC: 123 MG/DL (ref 65–140)
GLUCOSE SERPL-MCNC: 123 MG/DL (ref 65–140)
GLUCOSE SERPL-MCNC: 124 MG/DL (ref 65–140)
GLUCOSE SERPL-MCNC: 124 MG/DL (ref 65–140)
GLUCOSE SERPL-MCNC: 125 MG/DL (ref 65–140)
GLUCOSE SERPL-MCNC: 127 MG/DL (ref 65–140)
GLUCOSE SERPL-MCNC: 130 MG/DL (ref 65–140)
GLUCOSE SERPL-MCNC: 130 MG/DL (ref 65–140)
GLUCOSE SERPL-MCNC: 134 MG/DL (ref 65–140)
GLUCOSE SERPL-MCNC: 135 MG/DL (ref 65–140)
GLUCOSE SERPL-MCNC: 136 MG/DL (ref 65–140)
GLUCOSE SERPL-MCNC: 138 MG/DL (ref 65–140)
GLUCOSE SERPL-MCNC: 161 MG/DL (ref 65–140)
GLUCOSE SERPL-MCNC: 258 MG/DL (ref 65–140)
GLUCOSE SERPL-MCNC: 92 MG/DL (ref 65–140)
GLUCOSE SERPL-MCNC: 95 MG/DL (ref 65–140)
GLUCOSE SERPL-MCNC: 96 MG/DL (ref 65–140)
GLUCOSE UR STRIP-MCNC: NEGATIVE MG/DL
GLUCOSE UR STRIP-MCNC: NEGATIVE MG/DL
HCT VFR BLD AUTO: 21 % (ref 34.8–46.1)
HCT VFR BLD AUTO: 24.5 % (ref 34.8–46.1)
HCT VFR BLD AUTO: 24.7 % (ref 34.8–46.1)
HCT VFR BLD AUTO: 26.3 % (ref 34.8–46.1)
HCT VFR BLD AUTO: 28.3 % (ref 34.8–46.1)
HCT VFR BLD AUTO: 29.7 % (ref 34.8–46.1)
HCT VFR BLD AUTO: 29.9 % (ref 34.8–46.1)
HCT VFR BLD AUTO: 31.2 % (ref 34.8–46.1)
HCT VFR BLD AUTO: 32.4 % (ref 34.8–46.1)
HCT VFR BLD AUTO: 36.5 % (ref 34.8–46.1)
HDLC SERPL-MCNC: 15 MG/DL
HEMOCCULT STL QL: ABNORMAL
HEMOCCULT STL QL: ABNORMAL
HEMOCCULT STL QL: POSITIVE
HGB BLD-MCNC: 10 G/DL (ref 11.5–15.4)
HGB BLD-MCNC: 11 G/DL (ref 11.5–15.4)
HGB BLD-MCNC: 6.6 G/DL (ref 11.5–15.4)
HGB BLD-MCNC: 7.6 G/DL (ref 11.5–15.4)
HGB BLD-MCNC: 7.9 G/DL (ref 11.5–15.4)
HGB BLD-MCNC: 8.2 G/DL (ref 11.5–15.4)
HGB BLD-MCNC: 8.9 G/DL (ref 11.5–15.4)
HGB BLD-MCNC: 9.2 G/DL (ref 11.5–15.4)
HGB BLD-MCNC: 9.3 G/DL (ref 11.5–15.4)
HGB BLD-MCNC: 9.7 G/DL (ref 11.5–15.4)
HGB UR QL STRIP.AUTO: ABNORMAL
HGB UR QL STRIP.AUTO: ABNORMAL
HYALINE CASTS #/AREA URNS LPF: ABNORMAL /LPF
IMM GRANULOCYTES # BLD AUTO: 0.09 THOUSAND/UL (ref 0–0.2)
IMM GRANULOCYTES # BLD AUTO: 0.17 THOUSAND/UL (ref 0–0.2)
IMM GRANULOCYTES # BLD AUTO: 0.23 THOUSAND/UL (ref 0–0.2)
IMM GRANULOCYTES # BLD AUTO: 0.23 THOUSAND/UL (ref 0–0.2)
IMM GRANULOCYTES # BLD AUTO: 0.27 THOUSAND/UL (ref 0–0.2)
IMM GRANULOCYTES # BLD AUTO: 0.28 THOUSAND/UL (ref 0–0.2)
IMM GRANULOCYTES # BLD AUTO: 0.37 THOUSAND/UL (ref 0–0.2)
IMM GRANULOCYTES # BLD AUTO: >0.5 THOUSAND/UL (ref 0–0.2)
IMM GRANULOCYTES # BLD AUTO: >0.5 THOUSAND/UL (ref 0–0.2)
IMM GRANULOCYTES NFR BLD AUTO: 1 % (ref 0–2)
IMM GRANULOCYTES NFR BLD AUTO: 2 % (ref 0–2)
INR PPP: 1.73 (ref 0.84–1.19)
INR PPP: 2.06 (ref 0.84–1.19)
INR PPP: 2.29 (ref 0.84–1.19)
INR PPP: 2.47 (ref 0.84–1.19)
INR PPP: 2.59 (ref 0.84–1.19)
INR PPP: 2.65 (ref 0.84–1.19)
INR PPP: 2.97 (ref 0.84–1.19)
INR PPP: 3.15 (ref 0.84–1.19)
INR PPP: 3.34 (ref 0.84–1.19)
INR PPP: 3.51 (ref 0.84–1.19)
KETONES UR STRIP-MCNC: NEGATIVE MG/DL
KETONES UR STRIP-MCNC: NEGATIVE MG/DL
LACTATE SERPL-SCNC: 2 MMOL/L (ref 0.5–2)
LACTATE SERPL-SCNC: 2.1 MMOL/L (ref 0.5–2)
LDLC SERPL CALC-MCNC: 42 MG/DL (ref 0–100)
LEUKOCYTE ESTERASE UR QL STRIP: ABNORMAL
LEUKOCYTE ESTERASE UR QL STRIP: ABNORMAL
LYMPHOCYTES # BLD AUTO: 0.97 THOUSANDS/ΜL (ref 0.6–4.47)
LYMPHOCYTES # BLD AUTO: 0.98 THOUSANDS/ΜL (ref 0.6–4.47)
LYMPHOCYTES # BLD AUTO: 1.28 THOUSANDS/ΜL (ref 0.6–4.47)
LYMPHOCYTES # BLD AUTO: 1.54 THOUSANDS/ΜL (ref 0.6–4.47)
LYMPHOCYTES # BLD AUTO: 1.57 THOUSANDS/ΜL (ref 0.6–4.47)
LYMPHOCYTES # BLD AUTO: 1.58 THOUSANDS/ΜL (ref 0.6–4.47)
LYMPHOCYTES # BLD AUTO: 1.6 THOUSANDS/ΜL (ref 0.6–4.47)
LYMPHOCYTES # BLD AUTO: 1.63 THOUSANDS/ΜL (ref 0.6–4.47)
LYMPHOCYTES # BLD AUTO: 1.82 THOUSANDS/ΜL (ref 0.6–4.47)
LYMPHOCYTES NFR BLD AUTO: 4 % (ref 14–44)
LYMPHOCYTES NFR BLD AUTO: 4 % (ref 14–44)
LYMPHOCYTES NFR BLD AUTO: 5 % (ref 14–44)
LYMPHOCYTES NFR BLD AUTO: 6 % (ref 14–44)
LYMPHOCYTES NFR BLD AUTO: 6 % (ref 14–44)
LYMPHOCYTES NFR BLD AUTO: 7 % (ref 14–44)
LYMPHOCYTES NFR BLD AUTO: 9 % (ref 14–44)
MAGNESIUM SERPL-MCNC: 1.7 MG/DL (ref 1.6–2.6)
MAGNESIUM SERPL-MCNC: 1.8 MG/DL (ref 1.6–2.6)
MAGNESIUM SERPL-MCNC: 1.9 MG/DL (ref 1.6–2.6)
MAGNESIUM SERPL-MCNC: 2 MG/DL (ref 1.6–2.6)
MAGNESIUM SERPL-MCNC: 2.1 MG/DL (ref 1.6–2.6)
MAGNESIUM SERPL-MCNC: 2.2 MG/DL (ref 1.6–2.6)
MAGNESIUM SERPL-MCNC: 2.5 MG/DL (ref 1.6–2.6)
MAGNESIUM SERPL-MCNC: 2.7 MG/DL (ref 1.6–2.6)
MCH RBC QN AUTO: 28.4 PG (ref 26.8–34.3)
MCH RBC QN AUTO: 28.5 PG (ref 26.8–34.3)
MCH RBC QN AUTO: 28.6 PG (ref 26.8–34.3)
MCH RBC QN AUTO: 28.8 PG (ref 26.8–34.3)
MCH RBC QN AUTO: 29.1 PG (ref 26.8–34.3)
MCHC RBC AUTO-ENTMCNC: 30.1 G/DL (ref 31.4–37.4)
MCHC RBC AUTO-ENTMCNC: 30.9 G/DL (ref 31.4–37.4)
MCHC RBC AUTO-ENTMCNC: 31 G/DL (ref 31.4–37.4)
MCHC RBC AUTO-ENTMCNC: 31.1 G/DL (ref 31.4–37.4)
MCHC RBC AUTO-ENTMCNC: 31.1 G/DL (ref 31.4–37.4)
MCHC RBC AUTO-ENTMCNC: 31.4 G/DL (ref 31.4–37.4)
MCHC RBC AUTO-ENTMCNC: 32 G/DL (ref 31.4–37.4)
MCV RBC AUTO: 90 FL (ref 82–98)
MCV RBC AUTO: 91 FL (ref 82–98)
MCV RBC AUTO: 92 FL (ref 82–98)
MCV RBC AUTO: 92 FL (ref 82–98)
MCV RBC AUTO: 93 FL (ref 82–98)
MCV RBC AUTO: 94 FL (ref 82–98)
MONOCYTES # BLD AUTO: 1.23 THOUSAND/ΜL (ref 0.17–1.22)
MONOCYTES # BLD AUTO: 1.34 THOUSAND/ΜL (ref 0.17–1.22)
MONOCYTES # BLD AUTO: 1.63 THOUSAND/ΜL (ref 0.17–1.22)
MONOCYTES # BLD AUTO: 1.75 THOUSAND/ΜL (ref 0.17–1.22)
MONOCYTES # BLD AUTO: 1.77 THOUSAND/ΜL (ref 0.17–1.22)
MONOCYTES # BLD AUTO: 1.89 THOUSAND/ΜL (ref 0.17–1.22)
MONOCYTES # BLD AUTO: 2.03 THOUSAND/ΜL (ref 0.17–1.22)
MONOCYTES # BLD AUTO: 2.31 THOUSAND/ΜL (ref 0.17–1.22)
MONOCYTES # BLD AUTO: 2.52 THOUSAND/ΜL (ref 0.17–1.22)
MONOCYTES NFR BLD AUTO: 10 % (ref 4–12)
MONOCYTES NFR BLD AUTO: 10 % (ref 4–12)
MONOCYTES NFR BLD AUTO: 5 % (ref 4–12)
MONOCYTES NFR BLD AUTO: 6 % (ref 4–12)
MONOCYTES NFR BLD AUTO: 6 % (ref 4–12)
MONOCYTES NFR BLD AUTO: 7 % (ref 4–12)
MONOCYTES NFR BLD AUTO: 8 % (ref 4–12)
MONOCYTES NFR BLD AUTO: 8 % (ref 4–12)
MONOCYTES NFR BLD AUTO: 9 % (ref 4–12)
NEUTROPHILS # BLD AUTO: 14.47 THOUSANDS/ΜL (ref 1.85–7.62)
NEUTROPHILS # BLD AUTO: 18.41 THOUSANDS/ΜL (ref 1.85–7.62)
NEUTROPHILS # BLD AUTO: 20.43 THOUSANDS/ΜL (ref 1.85–7.62)
NEUTROPHILS # BLD AUTO: 20.6 THOUSANDS/ΜL (ref 1.85–7.62)
NEUTROPHILS # BLD AUTO: 21.02 THOUSANDS/ΜL (ref 1.85–7.62)
NEUTROPHILS # BLD AUTO: 21.3 THOUSANDS/ΜL (ref 1.85–7.62)
NEUTROPHILS # BLD AUTO: 21.34 THOUSANDS/ΜL (ref 1.85–7.62)
NEUTROPHILS # BLD AUTO: 21.36 THOUSANDS/ΜL (ref 1.85–7.62)
NEUTROPHILS # BLD AUTO: 22.87 THOUSANDS/ΜL (ref 1.85–7.62)
NEUTS SEG NFR BLD AUTO: 77 % (ref 43–75)
NEUTS SEG NFR BLD AUTO: 81 % (ref 43–75)
NEUTS SEG NFR BLD AUTO: 81 % (ref 43–75)
NEUTS SEG NFR BLD AUTO: 82 % (ref 43–75)
NEUTS SEG NFR BLD AUTO: 82 % (ref 43–75)
NEUTS SEG NFR BLD AUTO: 84 % (ref 43–75)
NEUTS SEG NFR BLD AUTO: 86 % (ref 43–75)
NITRITE UR QL STRIP: NEGATIVE
NITRITE UR QL STRIP: NEGATIVE
NON-SQ EPI CELLS URNS QL MICRO: ABNORMAL /HPF
NON-SQ EPI CELLS URNS QL MICRO: ABNORMAL /HPF
NRBC BLD AUTO-RTO: 0 /100 WBCS
OTHER STN SPEC: ABNORMAL
OTHER STN SPEC: ABNORMAL
PH UR STRIP.AUTO: 5 [PH]
PH UR STRIP.AUTO: 6 [PH]
PHOSPHATE SERPL-MCNC: 2.9 MG/DL (ref 2.3–4.1)
PHOSPHATE SERPL-MCNC: 3 MG/DL (ref 2.3–4.1)
PHOSPHATE SERPL-MCNC: 3 MG/DL (ref 2.3–4.1)
PHOSPHATE SERPL-MCNC: 3.4 MG/DL (ref 2.3–4.1)
PHOSPHATE SERPL-MCNC: 4.1 MG/DL (ref 2.3–4.1)
PHOSPHATE SERPL-MCNC: 5.3 MG/DL (ref 2.3–4.1)
PHOSPHATE SERPL-MCNC: 5.6 MG/DL (ref 2.3–4.1)
PLATELET # BLD AUTO: 245 THOUSANDS/UL (ref 149–390)
PLATELET # BLD AUTO: 271 THOUSANDS/UL (ref 149–390)
PLATELET # BLD AUTO: 276 THOUSANDS/UL (ref 149–390)
PLATELET # BLD AUTO: 296 THOUSANDS/UL (ref 149–390)
PLATELET # BLD AUTO: 304 THOUSANDS/UL (ref 149–390)
PLATELET # BLD AUTO: 305 THOUSANDS/UL (ref 149–390)
PLATELET # BLD AUTO: 366 THOUSANDS/UL (ref 149–390)
PLATELET # BLD AUTO: 371 THOUSANDS/UL (ref 149–390)
PLATELET # BLD AUTO: 384 THOUSANDS/UL (ref 149–390)
PMV BLD AUTO: 10.7 FL (ref 8.9–12.7)
PMV BLD AUTO: 10.9 FL (ref 8.9–12.7)
PMV BLD AUTO: 11.2 FL (ref 8.9–12.7)
PMV BLD AUTO: 11.3 FL (ref 8.9–12.7)
PMV BLD AUTO: 11.3 FL (ref 8.9–12.7)
PMV BLD AUTO: 11.4 FL (ref 8.9–12.7)
PMV BLD AUTO: 11.6 FL (ref 8.9–12.7)
PMV BLD AUTO: 11.9 FL (ref 8.9–12.7)
PMV BLD AUTO: 9.9 FL (ref 8.9–12.7)
POTASSIUM SERPL-SCNC: 3.6 MMOL/L (ref 3.5–5.3)
POTASSIUM SERPL-SCNC: 3.7 MMOL/L (ref 3.5–5.3)
POTASSIUM SERPL-SCNC: 4 MMOL/L (ref 3.5–5.3)
POTASSIUM SERPL-SCNC: 4.2 MMOL/L (ref 3.5–5.3)
POTASSIUM SERPL-SCNC: 4.3 MMOL/L (ref 3.5–5.3)
POTASSIUM SERPL-SCNC: 4.6 MMOL/L (ref 3.5–5.3)
PROCALCITONIN SERPL-MCNC: 0.45 NG/ML
PROCALCITONIN SERPL-MCNC: 1.25 NG/ML
PROCALCITONIN SERPL-MCNC: 1.26 NG/ML
PROT SERPL-MCNC: 5.1 G/DL (ref 6.4–8.2)
PROT SERPL-MCNC: 5.4 G/DL (ref 6.4–8.2)
PROT SERPL-MCNC: 5.4 G/DL (ref 6.4–8.2)
PROT SERPL-MCNC: 5.7 G/DL (ref 6.4–8.2)
PROT SERPL-MCNC: 5.8 G/DL (ref 6.4–8.2)
PROT SERPL-MCNC: 5.9 G/DL (ref 6.4–8.2)
PROT SERPL-MCNC: 6 G/DL (ref 6.4–8.2)
PROT SERPL-MCNC: 6.9 G/DL (ref 6.4–8.2)
PROT UR STRIP-MCNC: ABNORMAL MG/DL
PROT UR STRIP-MCNC: ABNORMAL MG/DL
PROTHROMBIN TIME: 20 SECONDS (ref 11.6–14.5)
PROTHROMBIN TIME: 22.9 SECONDS (ref 11.6–14.5)
PROTHROMBIN TIME: 24.9 SECONDS (ref 11.6–14.5)
PROTHROMBIN TIME: 26.4 SECONDS (ref 11.6–14.5)
PROTHROMBIN TIME: 27.4 SECONDS (ref 11.6–14.5)
PROTHROMBIN TIME: 27.9 SECONDS (ref 11.6–14.5)
PROTHROMBIN TIME: 30.5 SECONDS (ref 11.6–14.5)
PROTHROMBIN TIME: 31.9 SECONDS (ref 11.6–14.5)
PROTHROMBIN TIME: 33.3 SECONDS (ref 11.6–14.5)
PROTHROMBIN TIME: 34.7 SECONDS (ref 11.6–14.5)
QRS AXIS: -19 DEGREES
QRSD INTERVAL: 146 MS
QT INTERVAL: 406 MS
QTC INTERVAL: 493 MS
RBC # BLD AUTO: 2.26 MILLION/UL (ref 3.81–5.12)
RBC # BLD AUTO: 2.64 MILLION/UL (ref 3.81–5.12)
RBC # BLD AUTO: 2.76 MILLION/UL (ref 3.81–5.12)
RBC # BLD AUTO: 3.12 MILLION/UL (ref 3.81–5.12)
RBC # BLD AUTO: 3.2 MILLION/UL (ref 3.81–5.12)
RBC # BLD AUTO: 3.2 MILLION/UL (ref 3.81–5.12)
RBC # BLD AUTO: 3.41 MILLION/UL (ref 3.81–5.12)
RBC # BLD AUTO: 3.52 MILLION/UL (ref 3.81–5.12)
RBC # BLD AUTO: 3.87 MILLION/UL (ref 3.81–5.12)
RBC #/AREA URNS AUTO: ABNORMAL /HPF
RBC #/AREA URNS AUTO: ABNORMAL /HPF
RH BLD: NEGATIVE
RH BLD: NEGATIVE
SCAN RESULT: NORMAL
SODIUM SERPL-SCNC: 130 MMOL/L (ref 136–145)
SODIUM SERPL-SCNC: 131 MMOL/L (ref 136–145)
SODIUM SERPL-SCNC: 131 MMOL/L (ref 136–145)
SODIUM SERPL-SCNC: 132 MMOL/L (ref 136–145)
SODIUM SERPL-SCNC: 134 MMOL/L (ref 136–145)
SODIUM SERPL-SCNC: 134 MMOL/L (ref 136–145)
SODIUM SERPL-SCNC: 135 MMOL/L (ref 136–145)
SODIUM SERPL-SCNC: 135 MMOL/L (ref 136–145)
SP GR UR STRIP.AUTO: 1.02 (ref 1–1.03)
SP GR UR STRIP.AUTO: 1.02 (ref 1–1.03)
SPECIMEN EXPIRATION DATE: NORMAL
T WAVE AXIS: 9 DEGREES
TRIGL SERPL-MCNC: 83 MG/DL
TROPONIN I SERPL-MCNC: 0.04 NG/ML
TSH SERPL DL<=0.05 MIU/L-ACNC: 0.32 UIU/ML (ref 0.36–3.74)
UNIT DISPENSE STATUS: NORMAL
UNIT DISPENSE STATUS: NORMAL
UNIT PRODUCT CODE: NORMAL
UNIT PRODUCT CODE: NORMAL
UNIT RH: NORMAL
UNIT RH: NORMAL
URATE SERPL-MCNC: 7.9 MG/DL (ref 2–6.8)
UROBILINOGEN UR QL STRIP.AUTO: 0.2 E.U./DL
UROBILINOGEN UR QL STRIP.AUTO: 0.2 E.U./DL
VENTRICULAR RATE: 89 BPM
WBC # BLD AUTO: 18.28 THOUSAND/UL (ref 4.31–10.16)
WBC # BLD AUTO: 22.59 THOUSAND/UL (ref 4.31–10.16)
WBC # BLD AUTO: 23.89 THOUSAND/UL (ref 4.31–10.16)
WBC # BLD AUTO: 24.11 THOUSAND/UL (ref 4.31–10.16)
WBC # BLD AUTO: 24.56 THOUSAND/UL (ref 4.31–10.16)
WBC # BLD AUTO: 25.5 THOUSAND/UL (ref 4.31–10.16)
WBC # BLD AUTO: 25.57 THOUSAND/UL (ref 4.31–10.16)
WBC # BLD AUTO: 26.06 THOUSAND/UL (ref 4.31–10.16)
WBC # BLD AUTO: 28.04 THOUSAND/UL (ref 4.31–10.16)
WBC #/AREA URNS AUTO: ABNORMAL /HPF
WBC #/AREA URNS AUTO: ABNORMAL /HPF

## 2021-01-01 PROCEDURE — 85610 PROTHROMBIN TIME: CPT | Performed by: INTERNAL MEDICINE

## 2021-01-01 PROCEDURE — 99231 SBSQ HOSP IP/OBS SF/LOW 25: CPT | Performed by: FAMILY MEDICINE

## 2021-01-01 PROCEDURE — 93005 ELECTROCARDIOGRAM TRACING: CPT

## 2021-01-01 PROCEDURE — 82948 REAGENT STRIP/BLOOD GLUCOSE: CPT

## 2021-01-01 PROCEDURE — 86850 RBC ANTIBODY SCREEN: CPT | Performed by: STUDENT IN AN ORGANIZED HEALTH CARE EDUCATION/TRAINING PROGRAM

## 2021-01-01 PROCEDURE — 99232 SBSQ HOSP IP/OBS MODERATE 35: CPT | Performed by: INTERNAL MEDICINE

## 2021-01-01 PROCEDURE — 86140 C-REACTIVE PROTEIN: CPT | Performed by: INTERNAL MEDICINE

## 2021-01-01 PROCEDURE — 99221 1ST HOSP IP/OBS SF/LOW 40: CPT | Performed by: INTERNAL MEDICINE

## 2021-01-01 PROCEDURE — 80053 COMPREHEN METABOLIC PANEL: CPT | Performed by: EMERGENCY MEDICINE

## 2021-01-01 PROCEDURE — 92610 EVALUATE SWALLOWING FUNCTION: CPT

## 2021-01-01 PROCEDURE — 85610 PROTHROMBIN TIME: CPT | Performed by: STUDENT IN AN ORGANIZED HEALTH CARE EDUCATION/TRAINING PROGRAM

## 2021-01-01 PROCEDURE — 88344 IMHCHEM/IMCYTCHM EA MLT ANTB: CPT | Performed by: PATHOLOGY

## 2021-01-01 PROCEDURE — 99222 1ST HOSP IP/OBS MODERATE 55: CPT | Performed by: FAMILY MEDICINE

## 2021-01-01 PROCEDURE — 99285 EMERGENCY DEPT VISIT HI MDM: CPT

## 2021-01-01 PROCEDURE — G0379 DIRECT REFER HOSPITAL OBSERV: HCPCS

## 2021-01-01 PROCEDURE — 83735 ASSAY OF MAGNESIUM: CPT | Performed by: STUDENT IN AN ORGANIZED HEALTH CARE EDUCATION/TRAINING PROGRAM

## 2021-01-01 PROCEDURE — 86922 COMPATIBILITY TEST ANTIGLOB: CPT

## 2021-01-01 PROCEDURE — 84100 ASSAY OF PHOSPHORUS: CPT | Performed by: STUDENT IN AN ORGANIZED HEALTH CARE EDUCATION/TRAINING PROGRAM

## 2021-01-01 PROCEDURE — 84443 ASSAY THYROID STIM HORMONE: CPT | Performed by: EMERGENCY MEDICINE

## 2021-01-01 PROCEDURE — 80053 COMPREHEN METABOLIC PANEL: CPT | Performed by: STUDENT IN AN ORGANIZED HEALTH CARE EDUCATION/TRAINING PROGRAM

## 2021-01-01 PROCEDURE — G1004 CDSM NDSC: HCPCS

## 2021-01-01 PROCEDURE — 85025 COMPLETE CBC W/AUTO DIFF WBC: CPT | Performed by: STUDENT IN AN ORGANIZED HEALTH CARE EDUCATION/TRAINING PROGRAM

## 2021-01-01 PROCEDURE — 76882 US LMTD JT/FCL EVL NVASC XTR: CPT

## 2021-01-01 PROCEDURE — 99232 SBSQ HOSP IP/OBS MODERATE 35: CPT | Performed by: FAMILY MEDICINE

## 2021-01-01 PROCEDURE — 74176 CT ABD & PELVIS W/O CONTRAST: CPT

## 2021-01-01 PROCEDURE — 87186 SC STD MICRODIL/AGAR DIL: CPT | Performed by: STUDENT IN AN ORGANIZED HEALTH CARE EDUCATION/TRAINING PROGRAM

## 2021-01-01 PROCEDURE — 80061 LIPID PANEL: CPT | Performed by: STUDENT IN AN ORGANIZED HEALTH CARE EDUCATION/TRAINING PROGRAM

## 2021-01-01 PROCEDURE — 88184 FLOWCYTOMETRY/ TC 1 MARKER: CPT | Performed by: STUDENT IN AN ORGANIZED HEALTH CARE EDUCATION/TRAINING PROGRAM

## 2021-01-01 PROCEDURE — 87077 CULTURE AEROBIC IDENTIFY: CPT | Performed by: STUDENT IN AN ORGANIZED HEALTH CARE EDUCATION/TRAINING PROGRAM

## 2021-01-01 PROCEDURE — 83605 ASSAY OF LACTIC ACID: CPT | Performed by: EMERGENCY MEDICINE

## 2021-01-01 PROCEDURE — 88185 FLOWCYTOMETRY/TC ADD-ON: CPT

## 2021-01-01 PROCEDURE — 93306 TTE W/DOPPLER COMPLETE: CPT

## 2021-01-01 PROCEDURE — 93306 TTE W/DOPPLER COMPLETE: CPT | Performed by: INTERNAL MEDICINE

## 2021-01-01 PROCEDURE — 84145 PROCALCITONIN (PCT): CPT | Performed by: STUDENT IN AN ORGANIZED HEALTH CARE EDUCATION/TRAINING PROGRAM

## 2021-01-01 PROCEDURE — 56605 BIOPSY OF VULVA/PERINEUM: CPT | Performed by: OBSTETRICS & GYNECOLOGY

## 2021-01-01 PROCEDURE — 87086 URINE CULTURE/COLONY COUNT: CPT | Performed by: STUDENT IN AN ORGANIZED HEALTH CARE EDUCATION/TRAINING PROGRAM

## 2021-01-01 PROCEDURE — 86880 COOMBS TEST DIRECT: CPT | Performed by: STUDENT IN AN ORGANIZED HEALTH CARE EDUCATION/TRAINING PROGRAM

## 2021-01-01 PROCEDURE — 1160F RVW MEDS BY RX/DR IN RCRD: CPT | Performed by: OBSTETRICS & GYNECOLOGY

## 2021-01-01 PROCEDURE — 85610 PROTHROMBIN TIME: CPT | Performed by: EMERGENCY MEDICINE

## 2021-01-01 PROCEDURE — 99222 1ST HOSP IP/OBS MODERATE 55: CPT | Performed by: ORTHOPAEDIC SURGERY

## 2021-01-01 PROCEDURE — 84484 ASSAY OF TROPONIN QUANT: CPT | Performed by: EMERGENCY MEDICINE

## 2021-01-01 PROCEDURE — 83735 ASSAY OF MAGNESIUM: CPT | Performed by: EMERGENCY MEDICINE

## 2021-01-01 PROCEDURE — 99285 EMERGENCY DEPT VISIT HI MDM: CPT | Performed by: EMERGENCY MEDICINE

## 2021-01-01 PROCEDURE — 99222 1ST HOSP IP/OBS MODERATE 55: CPT | Performed by: PODIATRIST

## 2021-01-01 PROCEDURE — 81001 URINALYSIS AUTO W/SCOPE: CPT | Performed by: EMERGENCY MEDICINE

## 2021-01-01 PROCEDURE — 88342 IMHCHEM/IMCYTCHM 1ST ANTB: CPT | Performed by: PATHOLOGY

## 2021-01-01 PROCEDURE — NC001 PR NO CHARGE: Performed by: FAMILY MEDICINE

## 2021-01-01 PROCEDURE — 86900 BLOOD TYPING SEROLOGIC ABO: CPT | Performed by: STUDENT IN AN ORGANIZED HEALTH CARE EDUCATION/TRAINING PROGRAM

## 2021-01-01 PROCEDURE — 3078F DIAST BP <80 MM HG: CPT | Performed by: OBSTETRICS & GYNECOLOGY

## 2021-01-01 PROCEDURE — 86905 BLOOD TYPING RBC ANTIGENS: CPT

## 2021-01-01 PROCEDURE — 87040 BLOOD CULTURE FOR BACTERIA: CPT | Performed by: EMERGENCY MEDICINE

## 2021-01-01 PROCEDURE — 97110 THERAPEUTIC EXERCISES: CPT

## 2021-01-01 PROCEDURE — 97167 OT EVAL HIGH COMPLEX 60 MIN: CPT

## 2021-01-01 PROCEDURE — 85014 HEMATOCRIT: CPT | Performed by: STUDENT IN AN ORGANIZED HEALTH CARE EDUCATION/TRAINING PROGRAM

## 2021-01-01 PROCEDURE — 1036F TOBACCO NON-USER: CPT | Performed by: OBSTETRICS & GYNECOLOGY

## 2021-01-01 PROCEDURE — 88305 TISSUE EXAM BY PATHOLOGIST: CPT | Performed by: PATHOLOGY

## 2021-01-01 PROCEDURE — 86901 BLOOD TYPING SEROLOGIC RH(D): CPT | Performed by: STUDENT IN AN ORGANIZED HEALTH CARE EDUCATION/TRAINING PROGRAM

## 2021-01-01 PROCEDURE — 99223 1ST HOSP IP/OBS HIGH 75: CPT | Performed by: INTERNAL MEDICINE

## 2021-01-01 PROCEDURE — P9016 RBC LEUKOCYTES REDUCED: HCPCS

## 2021-01-01 PROCEDURE — 73110 X-RAY EXAM OF WRIST: CPT

## 2021-01-01 PROCEDURE — 97163 PT EVAL HIGH COMPLEX 45 MIN: CPT

## 2021-01-01 PROCEDURE — 85730 THROMBOPLASTIN TIME PARTIAL: CPT | Performed by: EMERGENCY MEDICINE

## 2021-01-01 PROCEDURE — 30233N1 TRANSFUSION OF NONAUTOLOGOUS RED BLOOD CELLS INTO PERIPHERAL VEIN, PERCUTANEOUS APPROACH: ICD-10-PCS | Performed by: FAMILY MEDICINE

## 2021-01-01 PROCEDURE — 86921 COMPATIBILITY TEST INCUBATE: CPT

## 2021-01-01 PROCEDURE — 99223 1ST HOSP IP/OBS HIGH 75: CPT | Performed by: OBSTETRICS & GYNECOLOGY

## 2021-01-01 PROCEDURE — 83605 ASSAY OF LACTIC ACID: CPT | Performed by: STUDENT IN AN ORGANIZED HEALTH CARE EDUCATION/TRAINING PROGRAM

## 2021-01-01 PROCEDURE — 84550 ASSAY OF BLOOD/URIC ACID: CPT | Performed by: STUDENT IN AN ORGANIZED HEALTH CARE EDUCATION/TRAINING PROGRAM

## 2021-01-01 PROCEDURE — 88312 SPECIAL STAINS GROUP 1: CPT | Performed by: PATHOLOGY

## 2021-01-01 PROCEDURE — 86902 BLOOD TYPE ANTIGEN DONOR EA: CPT

## 2021-01-01 PROCEDURE — 71250 CT THORAX DX C-: CPT

## 2021-01-01 PROCEDURE — 73560 X-RAY EXAM OF KNEE 1 OR 2: CPT

## 2021-01-01 PROCEDURE — 3074F SYST BP LT 130 MM HG: CPT | Performed by: OBSTETRICS & GYNECOLOGY

## 2021-01-01 PROCEDURE — 85018 HEMOGLOBIN: CPT | Performed by: STUDENT IN AN ORGANIZED HEALTH CARE EDUCATION/TRAINING PROGRAM

## 2021-01-01 PROCEDURE — 99203 OFFICE O/P NEW LOW 30 MIN: CPT | Performed by: OBSTETRICS & GYNECOLOGY

## 2021-01-01 PROCEDURE — 93010 ELECTROCARDIOGRAM REPORT: CPT | Performed by: INTERNAL MEDICINE

## 2021-01-01 PROCEDURE — 99222 1ST HOSP IP/OBS MODERATE 55: CPT | Performed by: INTERNAL MEDICINE

## 2021-01-01 PROCEDURE — 82272 OCCULT BLD FECES 1-3 TESTS: CPT | Performed by: STUDENT IN AN ORGANIZED HEALTH CARE EDUCATION/TRAINING PROGRAM

## 2021-01-01 PROCEDURE — 92523 SPEECH SOUND LANG COMPREHEN: CPT

## 2021-01-01 PROCEDURE — 85025 COMPLETE CBC W/AUTO DIFF WBC: CPT | Performed by: EMERGENCY MEDICINE

## 2021-01-01 PROCEDURE — 71045 X-RAY EXAM CHEST 1 VIEW: CPT

## 2021-01-01 PROCEDURE — 86870 RBC ANTIBODY IDENTIFICATION: CPT | Performed by: STUDENT IN AN ORGANIZED HEALTH CARE EDUCATION/TRAINING PROGRAM

## 2021-01-01 PROCEDURE — 81001 URINALYSIS AUTO W/SCOPE: CPT | Performed by: STUDENT IN AN ORGANIZED HEALTH CARE EDUCATION/TRAINING PROGRAM

## 2021-01-01 PROCEDURE — 36415 COLL VENOUS BLD VENIPUNCTURE: CPT | Performed by: EMERGENCY MEDICINE

## 2021-01-01 PROCEDURE — 85652 RBC SED RATE AUTOMATED: CPT | Performed by: INTERNAL MEDICINE

## 2021-01-01 RX ORDER — CEFAZOLIN SODIUM 1 G/50ML
1000 SOLUTION INTRAVENOUS ONCE
Status: DISCONTINUED | OUTPATIENT
Start: 2021-01-01 | End: 2021-02-13 | Stop reason: HOSPADM

## 2021-01-01 RX ORDER — MIDODRINE HYDROCHLORIDE 5 MG/1
2.5 TABLET ORAL ONCE
Status: COMPLETED | OUTPATIENT
Start: 2021-01-01 | End: 2021-01-01

## 2021-01-01 RX ORDER — OXYCODONE HYDROCHLORIDE AND ACETAMINOPHEN 5; 325 MG/1; MG/1
1 TABLET ORAL EVERY 4 HOURS PRN
Status: DISCONTINUED | OUTPATIENT
Start: 2021-01-01 | End: 2021-01-01

## 2021-01-01 RX ORDER — ACETAMINOPHEN 325 MG/1
650 TABLET ORAL EVERY 6 HOURS PRN
Status: DISCONTINUED | OUTPATIENT
Start: 2021-01-01 | End: 2021-01-01

## 2021-01-01 RX ORDER — TRIAMCINOLONE ACETONIDE 1 MG/G
CREAM TOPICAL 2 TIMES DAILY
Status: DISCONTINUED | OUTPATIENT
Start: 2021-01-01 | End: 2021-01-01

## 2021-01-01 RX ORDER — LOSARTAN POTASSIUM 50 MG/1
100 TABLET ORAL DAILY
Status: DISCONTINUED | OUTPATIENT
Start: 2021-01-01 | End: 2021-01-01

## 2021-01-01 RX ORDER — ALPRAZOLAM 0.5 MG/1
0.5 TABLET ORAL
Status: DISCONTINUED | OUTPATIENT
Start: 2021-01-01 | End: 2021-01-01

## 2021-01-01 RX ORDER — SODIUM CHLORIDE, SODIUM GLUCONATE, SODIUM ACETATE, POTASSIUM CHLORIDE, MAGNESIUM CHLORIDE, SODIUM PHOSPHATE, DIBASIC, AND POTASSIUM PHOSPHATE .53; .5; .37; .037; .03; .012; .00082 G/100ML; G/100ML; G/100ML; G/100ML; G/100ML; G/100ML; G/100ML
120 INJECTION, SOLUTION INTRAVENOUS CONTINUOUS
Status: DISCONTINUED | OUTPATIENT
Start: 2021-01-01 | End: 2021-01-01

## 2021-01-01 RX ORDER — SACCHAROMYCES BOULARDII 250 MG
250 CAPSULE ORAL 2 TIMES DAILY
Status: DISCONTINUED | OUTPATIENT
Start: 2021-01-01 | End: 2021-01-01

## 2021-01-01 RX ORDER — SODIUM CHLORIDE 9 MG/ML
100 INJECTION, SOLUTION INTRAVENOUS CONTINUOUS
Status: DISCONTINUED | OUTPATIENT
Start: 2021-01-01 | End: 2021-01-01 | Stop reason: HOSPADM

## 2021-01-01 RX ORDER — DIPHENHYDRAMINE HYDROCHLORIDE 50 MG/ML
25 INJECTION INTRAMUSCULAR; INTRAVENOUS ONCE
Status: DISCONTINUED | OUTPATIENT
Start: 2021-01-01 | End: 2021-01-01

## 2021-01-01 RX ORDER — CEFAZOLIN SODIUM 1 G/50ML
1000 SOLUTION INTRAVENOUS ONCE
Status: CANCELLED | OUTPATIENT
Start: 2021-01-01 | End: 2021-01-01

## 2021-01-01 RX ORDER — METRONIDAZOLE 500 MG/1
500 TABLET ORAL EVERY 8 HOURS SCHEDULED
Status: DISCONTINUED | OUTPATIENT
Start: 2021-01-01 | End: 2021-01-01

## 2021-01-01 RX ORDER — CIPROFLOXACIN 2 MG/ML
200 INJECTION, SOLUTION INTRAVENOUS ONCE
Status: DISCONTINUED | OUTPATIENT
Start: 2021-01-01 | End: 2021-01-01

## 2021-01-01 RX ORDER — WARFARIN SODIUM 2.5 MG/1
2.5 TABLET ORAL
Status: DISCONTINUED | OUTPATIENT
Start: 2021-01-01 | End: 2021-01-01

## 2021-01-01 RX ORDER — LEVOTHYROXINE SODIUM 112 UG/1
112 TABLET ORAL
Status: DISCONTINUED | OUTPATIENT
Start: 2021-01-01 | End: 2021-01-01

## 2021-01-01 RX ORDER — CIPROFLOXACIN 500 MG/1
500 TABLET, FILM COATED ORAL EVERY 24 HOURS
Status: DISCONTINUED | OUTPATIENT
Start: 2021-01-01 | End: 2021-01-01

## 2021-01-01 RX ORDER — MIDODRINE HYDROCHLORIDE 5 MG/1
5 TABLET ORAL ONCE
Status: COMPLETED | OUTPATIENT
Start: 2021-01-01 | End: 2021-01-01

## 2021-01-01 RX ORDER — ONDANSETRON 4 MG/1
4 TABLET, ORALLY DISINTEGRATING ORAL ONCE
Status: COMPLETED | OUTPATIENT
Start: 2021-01-01 | End: 2021-01-01

## 2021-01-01 RX ORDER — PHYTONADIONE 5 MG/1
5 TABLET ORAL ONCE
Status: COMPLETED | OUTPATIENT
Start: 2021-01-01 | End: 2021-01-01

## 2021-01-01 RX ORDER — ACETAMINOPHEN 325 MG/1
650 TABLET ORAL ONCE
Status: COMPLETED | OUTPATIENT
Start: 2021-01-01 | End: 2021-01-01

## 2021-01-01 RX ORDER — PRAVASTATIN SODIUM 80 MG/1
80 TABLET ORAL
Status: DISCONTINUED | OUTPATIENT
Start: 2021-01-01 | End: 2021-01-01

## 2021-01-01 RX ORDER — LORAZEPAM 2 MG/ML
0.5 INJECTION INTRAMUSCULAR EVERY 4 HOURS PRN
Status: DISCONTINUED | OUTPATIENT
Start: 2021-01-01 | End: 2021-02-13 | Stop reason: HOSPADM

## 2021-01-01 RX ORDER — BISACODYL 10 MG
10 SUPPOSITORY, RECTAL RECTAL DAILY PRN
Status: DISCONTINUED | OUTPATIENT
Start: 2021-01-01 | End: 2021-02-13 | Stop reason: HOSPADM

## 2021-01-01 RX ORDER — BISACODYL 10 MG
10 SUPPOSITORY, RECTAL RECTAL DAILY PRN
Status: DISCONTINUED | OUTPATIENT
Start: 2021-01-01 | End: 2021-01-01 | Stop reason: HOSPADM

## 2021-01-01 RX ORDER — POTASSIUM CHLORIDE 20 MEQ/1
40 TABLET, EXTENDED RELEASE ORAL ONCE
Status: COMPLETED | OUTPATIENT
Start: 2021-01-01 | End: 2021-01-01

## 2021-01-01 RX ORDER — SODIUM CHLORIDE, SODIUM LACTATE, POTASSIUM CHLORIDE, CALCIUM CHLORIDE 600; 310; 30; 20 MG/100ML; MG/100ML; MG/100ML; MG/100ML
125 INJECTION, SOLUTION INTRAVENOUS CONTINUOUS
Status: DISCONTINUED | OUTPATIENT
Start: 2021-01-01 | End: 2021-02-13 | Stop reason: HOSPADM

## 2021-01-01 RX ORDER — CIPROFLOXACIN 2 MG/ML
400 INJECTION, SOLUTION INTRAVENOUS EVERY 24 HOURS
Status: DISCONTINUED | OUTPATIENT
Start: 2021-01-01 | End: 2021-01-01

## 2021-01-01 RX ORDER — LEVOTHYROXINE SODIUM 112 UG/1
TABLET ORAL
Qty: 90 TABLET | Refills: 3 | Status: SHIPPED | OUTPATIENT
Start: 2021-01-01 | End: 2021-01-01 | Stop reason: HOSPADM

## 2021-01-01 RX ORDER — CIPROFLOXACIN 2 MG/ML
400 INJECTION, SOLUTION INTRAVENOUS EVERY 12 HOURS
Status: DISCONTINUED | OUTPATIENT
Start: 2021-01-01 | End: 2021-01-01

## 2021-01-01 RX ORDER — LEVOTHYROXINE SODIUM 0.1 MG/1
100 TABLET ORAL
Status: DISCONTINUED | OUTPATIENT
Start: 2021-01-01 | End: 2021-01-01

## 2021-01-01 RX ORDER — ONDANSETRON 4 MG/1
4 TABLET, ORALLY DISINTEGRATING ORAL EVERY 6 HOURS PRN
Status: DISCONTINUED | OUTPATIENT
Start: 2021-01-01 | End: 2021-01-01 | Stop reason: HOSPADM

## 2021-01-01 RX ORDER — MAGNESIUM SULFATE HEPTAHYDRATE 40 MG/ML
4 INJECTION, SOLUTION INTRAVENOUS ONCE
Status: COMPLETED | OUTPATIENT
Start: 2021-01-01 | End: 2021-01-01

## 2021-01-01 RX ORDER — SODIUM CHLORIDE, SODIUM LACTATE, POTASSIUM CHLORIDE, CALCIUM CHLORIDE 600; 310; 30; 20 MG/100ML; MG/100ML; MG/100ML; MG/100ML
125 INJECTION, SOLUTION INTRAVENOUS CONTINUOUS
Status: CANCELLED | OUTPATIENT
Start: 2021-01-01

## 2021-01-01 RX ORDER — LORAZEPAM 2 MG/ML
0.5 INJECTION INTRAMUSCULAR EVERY 4 HOURS PRN
Status: DISCONTINUED | OUTPATIENT
Start: 2021-01-01 | End: 2021-01-01 | Stop reason: HOSPADM

## 2021-01-01 RX ORDER — SODIUM CHLORIDE, SODIUM LACTATE, POTASSIUM CHLORIDE, CALCIUM CHLORIDE 600; 310; 30; 20 MG/100ML; MG/100ML; MG/100ML; MG/100ML
120 INJECTION, SOLUTION INTRAVENOUS CONTINUOUS
Status: DISCONTINUED | OUTPATIENT
Start: 2021-01-01 | End: 2021-01-01

## 2021-01-01 RX ORDER — CIPROFLOXACIN 2 MG/ML
400 INJECTION, SOLUTION INTRAVENOUS ONCE
Status: COMPLETED | OUTPATIENT
Start: 2021-01-01 | End: 2021-01-01

## 2021-01-01 RX ORDER — OXYCODONE HYDROCHLORIDE AND ACETAMINOPHEN 5; 325 MG/1; MG/1
2 TABLET ORAL EVERY 4 HOURS PRN
Status: DISCONTINUED | OUTPATIENT
Start: 2021-01-01 | End: 2021-01-01

## 2021-01-01 RX ADMIN — Medication 250 MG: at 18:16

## 2021-01-01 RX ADMIN — TRIAMCINOLONE ACETONIDE: 1 CREAM TOPICAL at 08:16

## 2021-01-01 RX ADMIN — METRONIDAZOLE 500 MG: 500 TABLET, FILM COATED ORAL at 21:21

## 2021-01-01 RX ADMIN — CIPROFLOXACIN 400 MG: 2 INJECTION, SOLUTION INTRAVENOUS at 16:46

## 2021-01-01 RX ADMIN — METRONIDAZOLE 500 MG: 500 TABLET, FILM COATED ORAL at 22:04

## 2021-01-01 RX ADMIN — LEVOTHYROXINE SODIUM 100 MCG: 100 TABLET ORAL at 06:04

## 2021-01-01 RX ADMIN — SODIUM CHLORIDE, SODIUM GLUCONATE, SODIUM ACETATE, POTASSIUM CHLORIDE, MAGNESIUM CHLORIDE, SODIUM PHOSPHATE, DIBASIC, AND POTASSIUM PHOSPHATE 120 ML/HR: .53; .5; .37; .037; .03; .012; .00082 INJECTION, SOLUTION INTRAVENOUS at 18:16

## 2021-01-01 RX ADMIN — LEVOTHYROXINE SODIUM 100 MCG: 100 TABLET ORAL at 06:02

## 2021-01-01 RX ADMIN — MORPHINE SULFATE 1 MG: 2 INJECTION, SOLUTION INTRAMUSCULAR; INTRAVENOUS at 23:58

## 2021-01-01 RX ADMIN — Medication 1 TABLET: at 08:56

## 2021-01-01 RX ADMIN — SODIUM CHLORIDE, SODIUM LACTATE, POTASSIUM CHLORIDE, AND CALCIUM CHLORIDE 1000 ML: .6; .31; .03; .02 INJECTION, SOLUTION INTRAVENOUS at 13:22

## 2021-01-01 RX ADMIN — MORPHINE SULFATE 2 MG: 2 INJECTION, SOLUTION INTRAMUSCULAR; INTRAVENOUS at 02:49

## 2021-01-01 RX ADMIN — METOPROLOL SUCCINATE 150 MG: 50 TABLET, EXTENDED RELEASE ORAL at 21:41

## 2021-01-01 RX ADMIN — METOPROLOL SUCCINATE 150 MG: 50 TABLET, EXTENDED RELEASE ORAL at 09:40

## 2021-01-01 RX ADMIN — METRONIDAZOLE 500 MG: 500 TABLET, FILM COATED ORAL at 13:42

## 2021-01-01 RX ADMIN — PRAVASTATIN SODIUM 80 MG: 80 TABLET ORAL at 16:49

## 2021-01-01 RX ADMIN — LEVOTHYROXINE SODIUM 100 MCG: 100 TABLET ORAL at 05:57

## 2021-01-01 RX ADMIN — LEVOTHYROXINE SODIUM 100 MCG: 100 TABLET ORAL at 06:26

## 2021-01-01 RX ADMIN — MIDODRINE HYDROCHLORIDE 2.5 MG: 5 TABLET ORAL at 01:53

## 2021-01-01 RX ADMIN — ACETAMINOPHEN 650 MG: 325 TABLET, FILM COATED ORAL at 08:14

## 2021-01-01 RX ADMIN — TRIAMCINOLONE ACETONIDE: 1 CREAM TOPICAL at 08:58

## 2021-01-01 RX ADMIN — ACETAMINOPHEN 650 MG: 325 TABLET, FILM COATED ORAL at 12:33

## 2021-01-01 RX ADMIN — METOPROLOL SUCCINATE 150 MG: 50 TABLET, EXTENDED RELEASE ORAL at 10:52

## 2021-01-01 RX ADMIN — PRAVASTATIN SODIUM 80 MG: 80 TABLET ORAL at 18:15

## 2021-01-01 RX ADMIN — MIDODRINE HYDROCHLORIDE 5 MG: 5 TABLET ORAL at 22:43

## 2021-01-01 RX ADMIN — SODIUM CHLORIDE, SODIUM LACTATE, POTASSIUM CHLORIDE, AND CALCIUM CHLORIDE 120 ML/HR: .6; .31; .03; .02 INJECTION, SOLUTION INTRAVENOUS at 04:28

## 2021-01-01 RX ADMIN — Medication 250 MG: at 08:46

## 2021-01-01 RX ADMIN — CIPROFLOXACIN HYDROCHLORIDE 500 MG: 500 TABLET, FILM COATED ORAL at 17:13

## 2021-01-01 RX ADMIN — WARFARIN SODIUM 2.5 MG: 2.5 TABLET ORAL at 17:02

## 2021-01-01 RX ADMIN — MORPHINE SULFATE 1 MG: 2 INJECTION, SOLUTION INTRAMUSCULAR; INTRAVENOUS at 11:39

## 2021-01-01 RX ADMIN — WARFARIN SODIUM 2.5 MG: 2.5 TABLET ORAL at 17:13

## 2021-01-01 RX ADMIN — OXYCODONE HYDROCHLORIDE AND ACETAMINOPHEN 2 TABLET: 5; 325 TABLET ORAL at 22:08

## 2021-01-01 RX ADMIN — SODIUM CHLORIDE 1000 ML: 0.9 INJECTION, SOLUTION INTRAVENOUS at 14:17

## 2021-01-01 RX ADMIN — METRONIDAZOLE 500 MG: 500 TABLET, FILM COATED ORAL at 06:26

## 2021-01-01 RX ADMIN — Medication 1 TABLET: at 09:16

## 2021-01-01 RX ADMIN — MAGNESIUM SULFATE HEPTAHYDRATE 4 G: 40 INJECTION, SOLUTION INTRAVENOUS at 13:42

## 2021-01-01 RX ADMIN — ACETAMINOPHEN 650 MG: 325 TABLET, FILM COATED ORAL at 23:17

## 2021-01-01 RX ADMIN — Medication 1 TABLET: at 08:58

## 2021-01-01 RX ADMIN — SODIUM CHLORIDE 500 ML: 0.9 INJECTION, SOLUTION INTRAVENOUS at 22:08

## 2021-01-01 RX ADMIN — MORPHINE SULFATE 1 MG: 2 INJECTION, SOLUTION INTRAMUSCULAR; INTRAVENOUS at 03:28

## 2021-01-01 RX ADMIN — ONDANSETRON 4 MG: 4 TABLET, ORALLY DISINTEGRATING ORAL at 12:37

## 2021-01-01 RX ADMIN — PRAVASTATIN SODIUM 80 MG: 80 TABLET ORAL at 17:07

## 2021-01-01 RX ADMIN — PRAVASTATIN SODIUM 80 MG: 80 TABLET ORAL at 17:13

## 2021-01-01 RX ADMIN — SODIUM CHLORIDE 100 ML/HR: 0.9 INJECTION, SOLUTION INTRAVENOUS at 18:24

## 2021-01-01 RX ADMIN — CIPROFLOXACIN HYDROCHLORIDE 500 MG: 500 TABLET, FILM COATED ORAL at 17:07

## 2021-01-01 RX ADMIN — PRAVASTATIN SODIUM 80 MG: 80 TABLET ORAL at 17:42

## 2021-01-01 RX ADMIN — ACETAMINOPHEN 650 MG: 325 TABLET, FILM COATED ORAL at 06:35

## 2021-01-01 RX ADMIN — Medication 250 MG: at 08:56

## 2021-01-01 RX ADMIN — SODIUM CHLORIDE 100 ML/HR: 0.9 INJECTION, SOLUTION INTRAVENOUS at 06:10

## 2021-01-01 RX ADMIN — Medication 250 MG: at 09:41

## 2021-01-01 RX ADMIN — MAGNESIUM OXIDE TAB 400 MG (241.3 MG ELEMENTAL MG) 800 MG: 400 (241.3 MG) TAB at 09:40

## 2021-01-01 RX ADMIN — LORAZEPAM 0.5 MG: 2 INJECTION INTRAMUSCULAR; INTRAVENOUS at 14:51

## 2021-01-01 RX ADMIN — SODIUM CHLORIDE 100 ML/HR: 0.9 INJECTION, SOLUTION INTRAVENOUS at 21:22

## 2021-01-01 RX ADMIN — SODIUM CHLORIDE, SODIUM GLUCONATE, SODIUM ACETATE, POTASSIUM CHLORIDE, MAGNESIUM CHLORIDE, SODIUM PHOSPHATE, DIBASIC, AND POTASSIUM PHOSPHATE 120 ML/HR: .53; .5; .37; .037; .03; .012; .00082 INJECTION, SOLUTION INTRAVENOUS at 12:16

## 2021-01-01 RX ADMIN — Medication 1 TABLET: at 08:14

## 2021-01-01 RX ADMIN — LEVOTHYROXINE SODIUM 100 MCG: 100 TABLET ORAL at 06:07

## 2021-01-01 RX ADMIN — TRIAMCINOLONE ACETONIDE: 1 CREAM TOPICAL at 17:34

## 2021-01-01 RX ADMIN — METOPROLOL SUCCINATE 150 MG: 50 TABLET, EXTENDED RELEASE ORAL at 21:23

## 2021-01-01 RX ADMIN — METRONIDAZOLE 500 MG: 500 TABLET, FILM COATED ORAL at 06:02

## 2021-01-01 RX ADMIN — TRIAMCINOLONE ACETONIDE: 1 CREAM TOPICAL at 18:24

## 2021-01-01 RX ADMIN — Medication 250 MG: at 17:07

## 2021-01-01 RX ADMIN — METRONIDAZOLE 500 MG: 500 INJECTION, SOLUTION INTRAVENOUS at 06:24

## 2021-01-01 RX ADMIN — Medication 250 MG: at 17:52

## 2021-01-01 RX ADMIN — PRAVASTATIN SODIUM 80 MG: 80 TABLET ORAL at 16:39

## 2021-01-01 RX ADMIN — METRONIDAZOLE 500 MG: 500 INJECTION, SOLUTION INTRAVENOUS at 14:16

## 2021-01-01 RX ADMIN — LORAZEPAM 0.5 MG: 2 INJECTION INTRAMUSCULAR; INTRAVENOUS at 13:41

## 2021-01-01 RX ADMIN — ALPRAZOLAM 0.5 MG: 0.5 TABLET ORAL at 22:09

## 2021-01-01 RX ADMIN — Medication 250 MG: at 21:43

## 2021-01-01 RX ADMIN — GLYCERIN, HYPROMELLOSE, POLYETHYLENE GLYCOL 1 DROP: .2; .2; 1 LIQUID OPHTHALMIC at 10:54

## 2021-01-01 RX ADMIN — MORPHINE SULFATE 1 MG: 2 INJECTION, SOLUTION INTRAMUSCULAR; INTRAVENOUS at 08:30

## 2021-01-01 RX ADMIN — POTASSIUM CHLORIDE 40 MEQ: 1500 TABLET, EXTENDED RELEASE ORAL at 13:42

## 2021-01-01 RX ADMIN — LEVOTHYROXINE SODIUM 100 MCG: 100 TABLET ORAL at 05:33

## 2021-01-01 RX ADMIN — ONDANSETRON 4 MG: 4 TABLET, ORALLY DISINTEGRATING ORAL at 12:13

## 2021-01-01 RX ADMIN — METRONIDAZOLE 500 MG: 500 INJECTION, SOLUTION INTRAVENOUS at 21:40

## 2021-01-01 RX ADMIN — WARFARIN SODIUM 2.5 MG: 2.5 TABLET ORAL at 17:07

## 2021-01-01 RX ADMIN — METRONIDAZOLE 500 MG: 500 TABLET, FILM COATED ORAL at 14:35

## 2021-01-01 RX ADMIN — PHYTONADIONE 5 MG: 5 TABLET ORAL at 08:20

## 2021-01-01 RX ADMIN — MORPHINE SULFATE 2 MG: 2 INJECTION, SOLUTION INTRAMUSCULAR; INTRAVENOUS at 12:09

## 2021-01-01 RX ADMIN — MORPHINE SULFATE 1 MG: 2 INJECTION, SOLUTION INTRAMUSCULAR; INTRAVENOUS at 12:41

## 2021-01-01 RX ADMIN — INSULIN LISPRO 1 UNITS: 100 INJECTION, SOLUTION INTRAVENOUS; SUBCUTANEOUS at 17:07

## 2021-01-01 RX ADMIN — Medication 250 MG: at 09:16

## 2021-01-01 RX ADMIN — CIPROFLOXACIN HYDROCHLORIDE 500 MG: 500 TABLET, FILM COATED ORAL at 16:39

## 2021-01-01 RX ADMIN — OXYCODONE HYDROCHLORIDE AND ACETAMINOPHEN 1 TABLET: 5; 325 TABLET ORAL at 18:58

## 2021-01-01 RX ADMIN — MORPHINE SULFATE 1 MG: 2 INJECTION, SOLUTION INTRAMUSCULAR; INTRAVENOUS at 16:22

## 2021-01-01 RX ADMIN — TRIAMCINOLONE ACETONIDE: 1 CREAM TOPICAL at 09:55

## 2021-01-01 RX ADMIN — OXYCODONE HYDROCHLORIDE AND ACETAMINOPHEN 1 TABLET: 5; 325 TABLET ORAL at 13:25

## 2021-01-01 RX ADMIN — METOPROLOL SUCCINATE 150 MG: 50 TABLET, EXTENDED RELEASE ORAL at 08:46

## 2021-01-01 RX ADMIN — Medication 1 TABLET: at 09:40

## 2021-01-01 RX ADMIN — METOPROLOL SUCCINATE 150 MG: 50 TABLET, EXTENDED RELEASE ORAL at 09:16

## 2021-01-01 RX ADMIN — SODIUM CHLORIDE, SODIUM LACTATE, POTASSIUM CHLORIDE, AND CALCIUM CHLORIDE 120 ML/HR: .6; .31; .03; .02 INJECTION, SOLUTION INTRAVENOUS at 18:55

## 2021-01-01 RX ADMIN — PRAVASTATIN SODIUM 80 MG: 80 TABLET ORAL at 16:50

## 2021-01-01 RX ADMIN — METOPROLOL SUCCINATE 150 MG: 50 TABLET, EXTENDED RELEASE ORAL at 22:46

## 2021-01-01 RX ADMIN — OXYCODONE HYDROCHLORIDE AND ACETAMINOPHEN 2 TABLET: 5; 325 TABLET ORAL at 18:18

## 2021-01-01 RX ADMIN — TRIAMCINOLONE ACETONIDE: 1 CREAM TOPICAL at 17:38

## 2021-01-01 RX ADMIN — Medication 1 TABLET: at 08:46

## 2021-01-01 RX ADMIN — WARFARIN SODIUM 2.5 MG: 2.5 TABLET ORAL at 17:52

## 2021-01-01 RX ADMIN — Medication 250 MG: at 17:13

## 2021-01-01 RX ADMIN — OXYCODONE HYDROCHLORIDE AND ACETAMINOPHEN 2 TABLET: 5; 325 TABLET ORAL at 10:06

## 2021-01-01 RX ADMIN — MORPHINE SULFATE 1 MG: 2 INJECTION, SOLUTION INTRAMUSCULAR; INTRAVENOUS at 20:22

## 2021-01-01 RX ADMIN — SODIUM CHLORIDE, SODIUM GLUCONATE, SODIUM ACETATE, POTASSIUM CHLORIDE, MAGNESIUM CHLORIDE, SODIUM PHOSPHATE, DIBASIC, AND POTASSIUM PHOSPHATE 120 ML/HR: .53; .5; .37; .037; .03; .012; .00082 INJECTION, SOLUTION INTRAVENOUS at 02:49

## 2021-01-01 RX ADMIN — PRAVASTATIN SODIUM 80 MG: 80 TABLET ORAL at 18:16

## 2021-01-01 RX ADMIN — METRONIDAZOLE 500 MG: 500 TABLET, FILM COATED ORAL at 22:46

## 2021-01-01 RX ADMIN — METOPROLOL SUCCINATE 150 MG: 50 TABLET, EXTENDED RELEASE ORAL at 22:04

## 2021-01-01 RX ADMIN — LEVOTHYROXINE SODIUM 100 MCG: 100 TABLET ORAL at 06:24

## 2021-01-01 RX ADMIN — Medication 1 MG/HR: at 14:21

## 2021-01-01 RX ADMIN — SODIUM CHLORIDE, SODIUM GLUCONATE, SODIUM ACETATE, POTASSIUM CHLORIDE, MAGNESIUM CHLORIDE, SODIUM PHOSPHATE, DIBASIC, AND POTASSIUM PHOSPHATE 120 ML/HR: .53; .5; .37; .037; .03; .012; .00082 INJECTION, SOLUTION INTRAVENOUS at 17:48

## 2021-01-01 RX ADMIN — METRONIDAZOLE 500 MG: 500 TABLET, FILM COATED ORAL at 14:27

## 2021-01-01 RX ADMIN — ACETAMINOPHEN 650 MG: 325 TABLET, FILM COATED ORAL at 12:37

## 2021-01-01 RX ADMIN — MORPHINE SULFATE 2 MG: 2 INJECTION, SOLUTION INTRAMUSCULAR; INTRAVENOUS at 03:36

## 2021-01-01 RX ADMIN — Medication 1 TABLET: at 10:54

## 2021-01-01 RX ADMIN — METRONIDAZOLE 500 MG: 500 TABLET, FILM COATED ORAL at 05:57

## 2021-01-11 NOTE — PROGRESS NOTES
Subjective     Piyush Baker is a 80 y o   female here for a problem visit  Patient has had a bump on her bottom that was noted in the spring  She felt it started as a "nickel" size bump with smaller bumps on the surface that has steadily gotten bigger  She has had some light bleeding from the area and has had increased discomfort especially when sitting  Patient reports no other vaginal bleeding or discharge that she has noted  Reviewed with patient recommend biopsy and that I am very concerned that this could be cancerous  Patient was accepting and feels that area needs to be addressed as it has become uncomfortable for her to sit  She is very anxious about the area being touched and declines and needle to be involved  We reviewed topical lidocaine for biopsy and discussed planned biopsy  Patient reported consent  Son is waiting for her in the car  Patient reports she is not diabetic but does confirm presence of pacemaker and afib  She is on coumadin       Gynecologic History  No LMP recorded  Patient is postmenopausal       Past Medical History:   Diagnosis Date    Atrial fibrillation ([facility] [address] )     Basal cell carcinoma     Hyperlipidemia     Hypothyroid     Kidney disease        Past Surgical History:   Procedure Laterality Date    MOHS SURGERY  1998    shaving lesion, on face below left side of nose basal cell carcinoma     OB History        6    Para   3    Term   3            AB   1    Living   3       SAB   1    TAB        Ectopic        Multiple        Live Births   4               Current Outpatient Medications   Medication Instructions    ALPRAZolam (XANAX) 0 5 mg, Oral, Daily at bedtime PRN    Cinnamon 500 mg, Oral    Copper Gluconate (COPPER CAPS PO) Oral    DIOVAN 320 MG tablet No dose, route, or frequency recorded      Glucosamine-Chondroitin (GLUCOSAMINE CHONDR COMPLEX PO) 2 tablets, Oral    Glucosamine-MSM-Hyaluronic Acd (JOINT HEALTH PO) 2 tablets, Oral    levothyroxine 112 mcg, Oral, Daily    Magnesium 250 MG TABS 1 tablet, Oral, Daily    metoprolol succinate (TOPROL-XL) 50 mg, Oral, 3 tablets in the am and 3 tablets in the pm    Multiple Vitamins-Minerals (CENTRUM ULTRA WOMENS) TABS 1 tablet, Oral    Multiple Vitamins-Minerals (PRESERVISION AREDS 2+MULTI VIT PO) 2 tablets, Oral    potassium chloride (KCl) 2 mEq/mL SOLN Pt taking half tablet daily    simvastatin (ZOCOR) 40 mg, Oral    VITAMIN A PO 1 tablet, Oral    VITAMIN D PO Oral    warfarin (COUMADIN) 5 mg tablet Take 1/2 to 1 tablet daily or as directed   Zinc Sulfate (ZINC 15 PO) Oral       Allergies   Allergen Reactions    Other Other (See Comments)     Category: Adverse Reaction; Annotation - [id number]: HORSE SERUM - Pt states was used several years ago in tetanus boosters when she was a child  Social History     Tobacco Use    Smoking status: Former Smoker     Quit date:      Years since quittin 0    Smokeless tobacco: Never Used   Substance Use Topics    Alcohol use: Yes     Alcohol/week: 0 0 - 1 0 standard drinks    Drug use: No         Review of Systems  Review of Systems   Constitutional: Negative for chills, fatigue, fever and unexpected weight change  HENT: Negative for dental problem, sinus pressure and sinus pain  Eyes: Negative for visual disturbance  Respiratory: Negative for cough, shortness of breath and wheezing  Cardiovascular: Negative for chest pain and leg swelling  Gastrointestinal: Negative for constipation, diarrhea, nausea and vomiting  Genitourinary: Negative for menstrual problem, pelvic pain and urgency  Musculoskeletal: Negative for back pain  Allergic/Immunologic: Negative for environmental allergies  Neurological: Negative for dizziness and headaches            Objective     /68 (BP Location: Right arm, Patient Position: Sitting, Cuff Size: Standard)   Ht 5' 8" (1 727 m)   Wt 81 6 kg (180 lb)   BMI 27 37 kg/m² General appearance: alert and oriented, in no acute distress and anxious  Pelvic: vagina normal without discharge and large mass about 6 cm by 6 cm protruding from left vulva/vaginal opening  Patient very nervous about having area touced, some irriation on right vulva versus satillite lesions  Biopsy    Date/Time: 2021 11:40 AM  Performed by: Dennis Rojas MD  Authorized by: Dennis Rojas MD   Universal Protocol:  Consent given by: patient  Time out: Immediately prior to procedure a "time out" was called to verify the correct patient, procedure, equipment, support staff and site/side marked as required  Patient understanding: patient states understanding of the procedure being performed  Patient identity confirmed: verbally with patient      Procedure Details - Skin Biopsy:     Biopsy tissue type: skin    Biopsy method: punch biopsy      Body area: Anogenital    Anogenital location:  Vulva    Initial size (mm):  60    Final defect size (mm):  3     Lidocaine applied topically per patient preference  tischler used to biopsy lesion  Silver nitrate used for hemostasis, patient tolerated very well  Assessment  79 y/o  with large vulvar lesion  Plan  Biopsy performed, will refer once pathology returned

## 2021-01-21 NOTE — TELEPHONE ENCOUNTER
New Patient Encounter    New Patient Intake Form   Patient Details:  Gopal Cantu  1935  [id number]    Background Information:  [address] starts by opening a telephone encounter and gathering the following information   Who is calling to schedule? If not self, relationship to patient? self   Referring Provider Dr Pipe Ling   What is the diagnosis? Vulvar mass   Is this diagnosis confirmed? Yes   When was the diagnosis? 1/19/21   Is there a confirmed diagnosis from a biopsy/tissue reviewed by pathology? 1/11/21   Were outside slides requested? NA   Is patient aware of diagnosis? Yes   Is there a personal history and what kind? Yes - basil cell [place]   Is there a family history and what kind? Father - skin / paternal aunt - intestinal / paternal aunt - breast   Reason for visit? New Diagnosis   Have you had any imaging or labs done? If so: when, where? no     Are records in [facility]? yes   If patient has a prior history of breast cancer were old records obtained? NA   Was the patient told to bring a disk? no   Does the patient smoke or Vape? no   If yes, how many packs or cartridges per day? Scheduling Information:   Preferred [place]:  [facility]     Are there any dates/time the patient cannot be seen? Miscellaneous:    After completing the above information, please route to Financial Counselor and the appropriate Nurse Navigator for review

## 2021-02-01 PROBLEM — R91.1 PULMONARY NODULE: Status: ACTIVE | Noted: 2021-01-01

## 2021-02-01 PROBLEM — N90.3 VULVAR INTRAEPITHELIAL NEOPLASIA: Status: ACTIVE | Noted: 2021-01-01

## 2021-02-01 PROBLEM — M62.81 GENERALIZED MUSCLE WEAKNESS: Status: ACTIVE | Noted: 2021-01-01

## 2021-02-01 PROBLEM — K57.92 DIVERTICULITIS: Status: ACTIVE | Noted: 2021-01-01

## 2021-02-01 PROBLEM — N18.9 ACUTE ON CHRONIC KIDNEY FAILURE (HCC): Status: ACTIVE | Noted: 2021-01-01

## 2021-02-01 PROBLEM — R53.82 CHRONIC FATIGUE: Status: ACTIVE | Noted: 2021-01-01

## 2021-02-01 PROBLEM — I48.0 PAROXYSMAL ATRIAL FIBRILLATION (HCC): Status: ACTIVE | Noted: 2018-09-21

## 2021-02-01 PROBLEM — N17.9 ACUTE ON CHRONIC KIDNEY FAILURE (HCC): Status: ACTIVE | Noted: 2021-01-01

## 2021-02-01 PROBLEM — Z86.79 HISTORY OF SICK SINUS SYNDROME: Status: ACTIVE | Noted: 2021-01-01

## 2021-02-01 NOTE — ED PROVIDER NOTES
History  Chief Complaint   Patient presents with    Weakness - Generalized     patient c/o weakness for a month that has gotten progressively worse  States she went to the bathroom today and could not get up       79 y/o female presents with generalized weakness for a month cannot get up today from the bathroom toilet    Admits to dyspnea on exertion  Denies any chest pain,cough, fevers,chills,dysuria, urgency or frequency  Denies any abdominal pain nausea vomiting  No diarrhea or constipation  No recent falls expressive aphasia no head injury no slurred speech no focal weakness numbness tingling  No back pain  History provided by:  Patient   used: No        Prior to Admission Medications   Prescriptions Last Dose Informant Patient Reported? Taking?    ALPRAZolam (XANAX) 0 5 mg tablet Past Week at Unknown time Self No Yes   Sig: Take 1 tablet (0 5 mg total) by mouth daily at bedtime as needed for anxiety   Cinnamon 500 MG capsule 1/31/2021 at Unknown time Self Yes Yes   Sig: Take 500 mg by mouth   Copper Gluconate (COPPER CAPS PO) Past Week at Unknown time Self Yes Yes   Sig: Take by mouth   DIOVAN 320 MG tablet 1/31/2021 at Unknown time Self Yes Yes   Glucosamine-Chondroitin (GLUCOSAMINE CHONDR COMPLEX PO) 1/31/2021 at Unknown time Self Yes Yes   Sig: Take 2 tablets by mouth   Glucosamine-MSM-Hyaluronic Acd (JOINT HEALTH PO) 1/31/2021 at Unknown time Self Yes Yes   Sig: Take 2 tablets by mouth   Magnesium 250 MG TABS Past Week at Unknown time Self Yes Yes   Sig: Take 1 tablet by mouth daily   Multiple Vitamins-Minerals (CENTRUM ULTRA WOMENS) TABS 1/31/2021 at Unknown time Self Yes Yes   Sig: Take 1 tablet by mouth   Multiple Vitamins-Minerals (PRESERVISION AREDS 2+MULTI VIT PO) 1/31/2021 at Unknown time Self Yes Yes   Sig: Take 2 tablets by mouth   VITAMIN A PO Not Taking at Unknown time Self Yes No   Sig: Take 1 tablet by mouth   VITAMIN D PO 1/31/2021 at Unknown time Self Yes Yes Sig: Take by mouth   Zinc Sulfate (ZINC 15 PO) 2021 at Unknown time Self Yes Yes   Sig: Take by mouth   levothyroxine 112 mcg tablet 2021 at Unknown time  No Yes   Sig: TAKE 1 TABLET BY MOUTH  DAILY   metoprolol succinate (TOPROL-XL) 50 mg 24 hr tablet 2021 at Unknown time Self Yes Yes   Sig: Take 50 mg by mouth 3 tablets in the am and 3 tablets in the pm   potassium chloride (KCl) 2 mEq/mL SOLN 2021 at Unknown time Self Yes Yes   Sig: Pt taking half tablet daily   simvastatin (ZOCOR) 40 mg tablet 2021 at Unknown time Self Yes Yes   Sig: Take 40 mg by mouth   warfarin (COUMADIN) 5 mg tablet 2021 at Unknown time Self Yes Yes   Sig: Take 1/2 to 1 tablet daily or as directed  Facility-Administered Medications: None       Past Medical History:   Diagnosis Date    Atrial fibrillation ([facility] [address] )     Basal cell carcinoma     Hyperlipidemia     Hypothyroid     Kidney disease        Past Surgical History:   Procedure Laterality Date    CARDIAC PACEMAKER PLACEMENT      MOHS SURGERY  1998    shaving lesion, on face below left side of nose basal cell carcinoma       Family History   Problem Relation Age of Onset    Alzheimer's disease Mother     Parkinsonism Mother         symptomatic    Macular degeneration Mother     Stroke Father     Vascular Disease Father     Heart disease Maternal Uncle         cardiac    COPD Son     Heart disease Maternal Uncle     Heart disease Maternal Uncle     Heart disease Maternal Uncle     Heart disease Maternal Uncle      I have reviewed and agree with the history as documented  E-Cigarette/Vaping    E-Cigarette Use Never User      E-Cigarette/Vaping Substances     Social History     Tobacco Use    Smoking status: Former Smoker     Quit date:      Years since quittin 1    Smokeless tobacco: Never Used   Substance Use Topics    Alcohol use:  Yes     Alcohol/week: 0 0 - 1 0 standard drinks    Drug use: No       Review of Systems   Constitutional: Positive for fatigue  HENT: Negative  Eyes: Negative  Respiratory: Negative  Cardiovascular: Negative  Gastrointestinal: Negative  Endocrine: Negative  Genitourinary: Negative  Musculoskeletal: Negative  Skin: Negative  Allergic/Immunologic: Negative  Neurological: Positive for weakness  Hematological: Negative  Psychiatric/Behavioral: Negative  All other systems reviewed and are negative  Physical Exam  Physical Exam  Vitals signs and nursing note reviewed  Constitutional:       Appearance: She is well-developed  HENT:      Head: Normocephalic and atraumatic  Eyes:      Pupils: Pupils are equal, round, and reactive to light  Neck:      Musculoskeletal: Normal range of motion and neck supple  Cardiovascular:      Rate and Rhythm: Normal rate and regular rhythm  Pulmonary:      Effort: Pulmonary effort is normal       Breath sounds: Normal breath sounds  Abdominal:      General: Bowel sounds are normal       Palpations: Abdomen is soft  Musculoskeletal: Normal range of motion  Skin:     General: Skin is warm and dry  Neurological:      General: No focal deficit present  Mental Status: She is alert and oriented to person, place, and time  Mental status is at baseline  Cranial Nerves: No cranial nerve deficit  Sensory: No sensory deficit  Motor: No weakness        Coordination: Coordination normal       Gait: Gait normal       Deep Tendon Reflexes: Reflexes normal          Vital Signs  ED Triage Vitals   Temperature Pulse Respirations Blood Pressure SpO2   02/01/21 1102 02/01/21 1102 02/01/21 1102 02/01/21 1102 02/01/21 1102   97 6 °F (36 4 °C) 86 14 111/52 98 %      Temp Source Heart Rate Source Patient Position - Orthostatic VS BP Location FiO2 (%)   02/01/21 1102 02/01/21 1102 02/01/21 1102 02/01/21 1102 --   Oral Monitor Lying Left arm       Pain Score       02/01/21 1548       No Pain           Vitals: 02/02/21 2300 02/03/21 0300 02/03/21 0833 02/03/21 0845   BP: 143/64 155/68 159/70 159/70   Pulse: 66 74 74 74   Patient Position - Orthostatic VS: Lying Lying Lying          Visual Acuity  Visual Acuity      Most Recent Value   L Pupil Size (mm)  1   R Pupil Size (mm)  1          ED Medications  Medications   ALPRAZolam (XANAX) tablet 0 5 mg (has no administration in time range)   metoprolol succinate (TOPROL-XL) 24 hr tablet 150 mg (150 mg Oral Given 2/3/21 0846)   multivitamin-minerals (CENTRUM) tablet 1 tablet (1 tablet Oral Given 2/3/21 0846)   pravastatin (PRAVACHOL) tablet 80 mg (80 mg Oral Given 2/3/21 1707)   warfarin (COUMADIN) tablet 2 5 mg (2 5 mg Oral Given 2/3/21 1707)   insulin lispro (HumaLOG) 100 units/mL subcutaneous injection 1-6 Units (1 Units Subcutaneous Given 2/3/21 1707)   insulin lispro (HumaLOG) 100 units/mL subcutaneous injection 1-6 Units (1 Units Subcutaneous Not Given 2/2/21 2204)   levothyroxine tablet 100 mcg (100 mcg Oral Given 2/3/21 0602)   glycerin-hypromellose- (ARTIFICIAL TEARS) ophthalmic solution 1 drop (has no administration in time range)   saccharomyces boulardii (FLORASTOR) capsule 250 mg (250 mg Oral Given 2/3/21 1707)   ciprofloxacin (CIPRO) tablet 500 mg (500 mg Oral Given 2/3/21 1707)   metroNIDAZOLE (FLAGYL) tablet 500 mg (500 mg Oral Given 2/3/21 1427)   ciprofloxacin (CIPRO) IVPB (premix in 5% dextrose) 400 mg 200 mL (0 mg Intravenous Stopped 2/1/21 1746)   metroNIDAZOLE (FLAGYL) IVPB (premix) 500 mg 100 mL (500 mg Intravenous New Bag 2/1/21 1416)   sodium chloride 0 9 % bolus 1,000 mL (1,000 mL Intravenous New Bag 2/1/21 1417)   magnesium sulfate 4 g/100 mL IVPB (premix) 4 g (0 g Intravenous Stopped 2/2/21 1742)   potassium chloride (K-DUR,KLOR-CON) CR tablet 40 mEq (40 mEq Oral Given 2/2/21 1342)       Diagnostic Studies  Results Reviewed     Procedure Component Value Units Date/Time    Urine culture [[id number]]  (Abnormal) Collected: 02/01/21 1441    Lab Status: Preliminary result Specimen: Urine Updated: 02/03/21 1124     Urine Culture >100,000 cfu/ml Escherichia coli    Blood culture #1 [[id number]] Collected: 02/01/21 1415    Lab Status: Preliminary result Specimen: Blood from Arm, Left Updated: 02/03/21 0701     Blood Culture No Growth at 24 hrs  Blood culture #2 [[id number]] Collected: 02/01/21 1415    Lab Status: Preliminary result Specimen: Blood from Arm, Right Updated: 02/03/21 0701     Blood Culture No Growth at 24 hrs  Lactic acid 2 Hours [[id number]]  (Normal) Collected: 02/01/21 1923    Lab Status: Final result Specimen: Blood from Arm, Right Updated: 02/01/21 1951     LACTIC ACID 2 0 mmol/L     Narrative:      Result may be elevated if tourniquet was used during collection  Urine Microscopic [[id number]]  (Abnormal) Collected: 02/01/21 1444    Lab Status: Final result Specimen: Urine, Clean Catch Updated: 02/01/21 1512     RBC, UA 20-30 /hpf      WBC, UA Innumerable /hpf      Epithelial Cells Moderate /hpf      Bacteria, UA Innumerable /hpf      OTHER OBSERVATIONS Transitional Epithelial Cells    Lactic acid [[id number]]  (Abnormal) Collected: 02/01/21 1415    Lab Status: Final result Specimen: Blood from Arm, Left Updated: 02/01/21 1510     LACTIC ACID 2 1 mmol/L     Narrative:      Result may be elevated if tourniquet was used during collection      UA (URINE) with reflex to Scope [[id number]]  (Abnormal) Collected: 02/01/21 1444    Lab Status: Final result Specimen: Urine, Clean Catch Updated: 02/01/21 1452     Color, UA Jeanna     Clarity, UA Cloudy     Specific Gravity, UA 1 025     pH, UA 6 0     Leukocytes, UA Moderate     Nitrite, UA Negative     Protein,  (2+) mg/dl      Glucose, UA Negative mg/dl      Ketones, UA Negative mg/dl      Urobilinogen, UA 0 2 E U /dl      Bilirubin, UA Interference- unable to analyze     Blood, UA Large    TSH [[id number]]  (Abnormal) Collected: 02/01/21 1124    Lab Status: Final result Specimen: Blood from Arm, Left Updated: 02/01/21 1201     TSH 3RD GENERATON 0 322 uIU/mL     Narrative:      Patients undergoing fluorescein dye angiography may retain small amounts of fluorescein in the body for 48-72 hours post procedure  Samples containing fluorescein can produce falsely depressed TSH values  If the patient had this procedure,a specimen should be resubmitted post fluorescein clearance        Troponin I [[id number]]  (Normal) Collected: 02/01/21 1124    Lab Status: Final result Specimen: Blood from Arm, Left Updated: 02/01/21 1154     Troponin I 0 04 ng/mL     Comprehensive metabolic panel [[id number]]  (Abnormal) Collected: 02/01/21 1124    Lab Status: Final result Specimen: Blood from Arm, Left Updated: 02/01/21 1150     Sodium 135 mmol/L      Potassium 3 7 mmol/L      Chloride 101 mmol/L      CO2 26 mmol/L      ANION GAP 8 mmol/L      BUN 20 mg/dL      Creatinine 1 70 mg/dL      Glucose 258 mg/dL      Calcium 10 3 mg/dL      Corrected Calcium 11 6 mg/dL      AST 25 U/L      ALT 20 U/L      Alkaline Phosphatase 102 U/L      Total Protein 6 9 g/dL      Albumin 2 4 g/dL      Total Bilirubin 0 60 mg/dL      eGFR 27 ml/min/1 73sq m     Narrative:      [place] guidelines for Chronic Kidney Disease (CKD):     Stage 1 with normal or high GFR (GFR > 90 mL/min/1 73 square meters)    Stage 2 Mild CKD (GFR = 60-89 mL/min/1 73 square meters)    Stage 3A Moderate CKD (GFR = 45-59 mL/min/1 73 square meters)    Stage 3B Moderate CKD (GFR = 30-44 mL/min/1 73 square meters)    Stage 4 Severe CKD (GFR = 15-29 mL/min/1 73 square meters)    Stage 5 End Stage CKD (GFR <15 mL/min/1 73 square meters)  Note: GFR calculation is accurate only with a steady state creatinine    Magnesium [[id number]]  (Normal) Collected: 02/01/21 1124    Lab Status: Final result Specimen: Blood from Arm, Left Updated: 02/01/21 1150     Magnesium 1 9 mg/dL     APTT [[id number]]  (Normal) Collected: 02/01/21 1130    Lab Status: Final result Specimen: Blood from Arm, Left Updated: 02/01/21 1149     PTT 33 seconds     Protime-INR [[id number]]  (Abnormal) Collected: 02/01/21 1130    Lab Status: Final result Specimen: Blood from Arm, Left Updated: 02/01/21 1149     Protime 20 0 seconds      INR 1 73    CBC and differential [[id number]]  (Abnormal) Collected: 02/01/21 1124    Lab Status: Final result Specimen: Blood from Arm, Left Updated: 02/01/21 1133     WBC 23 89 Thousand/uL      RBC 3 87 Million/uL      Hemoglobin 11 0 g/dL      Hematocrit 36 5 %      MCV 94 fL      MCH 28 4 pg      MCHC 30 1 g/dL      RDW 14 3 %      MPV 10 9 fL      Platelets 205 Thousands/uL      nRBC 0 /100 WBCs      Neutrophils Relative 84 %      Immat GRANS % 1 %      Lymphocytes Relative 7 %      Monocytes Relative 5 %      Eosinophils Relative 2 %      Basophils Relative 1 %      Neutrophils Absolute 20 43 Thousands/µL      Immature Grans Absolute 0 17 Thousand/uL      Lymphocytes Absolute 1 58 Thousands/µL      Monocytes Absolute 1 23 Thousand/µL      Eosinophils Absolute 0 37 Thousand/µL      Basophils Absolute 0 11 Thousands/µL                  CT chest abdomen pelvis wo contrast   Final Result by Giacomo Sweet MD (02/01 1417)      CHEST:   1  Irregular pulmonary nodule in the medial right upper lobe abutting the superior mediastinum measuring 2 0 x 1 3 cm  Based on current Fleischner Society 2017 Guidelines on incidental pulmonary nodule, PET/CT scan evaluation or tissue sampling may be    considered for further evaluation  2   Additional groundglass nodules measuring up to 7 mm above  3   Mild emphysema  4   Nonspecific enlarged precarinal lymph node  ABDOMEN AND PELVIS:   1  Diverticulosis coli  Possible segmental perisigmoid fat stranding may be due to acute diverticulitis, although evaluation is limited  2   Left greater than right nonspecific perinephric fat stranding, grossly similar to prior  No hydronephrosis        3  Prominent to mildly enlarged left inguinal lymph nodes, nonspecific  I personally discussed this study with Damari Friedman on 2/1/2021 at 2:03 PM                   Workstation performed: [id number]         XR chest 1 view portable   Final Result by Samuel Fleming DO (02/01 1220)      No focal consolidation, pleural effusion, or pneumothorax  Workstation performed: [id number]                    Procedures  ECG 12 Lead Documentation Only    Date/Time: 2/1/2021 11:29 AM  Performed by: Jenni Lo DO  Authorized by: Jenni Lo DO     ECG reviewed by me, the ED Provider: yes    Patient location:  ED  Previous ECG:     Previous ECG:  Unavailable    Comparison to cardiac monitor: Yes    Interpretation:     Interpretation: non-specific    Rhythm:     Rhythm: atrial fibrillation    Ectopy:     Ectopy: none    QRS:     QRS axis:  Normal  Conduction:     Conduction: normal    ST segments:     ST segments:  Non-specific  T waves:     T waves: non-specific               ED Course                             SBIRT 22yo+      Most Recent Value   SBIRT (22 yo +)   In order to provide better care to our patients, we are screening all of our patients for alcohol and drug use  Would it be okay to ask you these screening questions?   No Filed at: 02/01/2021 1302                    MDM  Number of Diagnoses or Management Options  Diverticulitis:   Generalized weakness:      Amount and/or Complexity of Data Reviewed  Clinical lab tests: ordered and reviewed  Tests in the radiology section of CPT®: ordered and reviewed  Tests in the medicine section of CPT®: ordered and reviewed    Patient Progress  Patient progress: stable      Disposition  Final diagnoses:   Generalized weakness   Diverticulitis     Time reflects when diagnosis was documented in both MDM as applicable and the Disposition within this note     Time User Action Codes Description Comment    2/1/2021  2:15 PM Yue Pratt Add [R53 1] Generalized weakness     2/1/2021  2:15 PM Anepu, Yue Add [K57 92] Diverticulitis     2/3/2021  1:45 PM Viotto Hazel Beams Add [N90 3] Vulvar intraepithelial neoplasia, differentiated-type (dVIN)     2/3/2021  1:45 PM Viotto, Lohman Beams Add [K57 92] Diverticulosis with fat stranding and leukocytosis     2/3/2021  1:45 PM Kaitsergocollin Jesusita Bear [H34 13] Diverticulosis with fat stranding and leukocytosis     2/3/2021  5:10 PM Thomasfrancy Maldonado Add [M62 81] Generalized muscle weakness     2/3/2021  5:10 PM Viotto, Hazel Beams Add [K57 92] Diverticulosis with fat stranding and leukocytosis     2/3/2021  5:10 PM Seracollin Jesusita Bear [T05 33] Generalized muscle weakness     2/3/2021  5:10 PM KaitsergoAlon polancotte Bear [N95 74] Diverticulosis with fat stranding and leukocytosis     2/3/2021  5:14 PM Viotto Lohman Beams Add [N39 0] UTI (urinary tract infection)       ED Disposition     ED Disposition Condition Date/Time Comment    Admit Stable Mon Feb 1, 2021  2:15 PM          Follow-up Information    None         Current Discharge Medication List      CONTINUE these medications which have NOT CHANGED    Details   ALPRAZolam (XANAX) 0 5 mg tablet Take 1 tablet (0 5 mg total) by mouth daily at bedtime as needed for anxiety  Qty: 90 tablet, Refills: 0    Associated Diagnoses: Anxiety      Cinnamon 500 MG capsule Take 500 mg by mouth      Copper Gluconate (COPPER CAPS PO) Take by mouth      DIOVAN 320 MG tablet       Glucosamine-Chondroitin (GLUCOSAMINE CHONDR COMPLEX PO) Take 2 tablets by mouth      Glucosamine-MSM-Hyaluronic Acd (JOINT HEALTH PO) Take 2 tablets by mouth      levothyroxine 112 mcg tablet TAKE 1 TABLET BY MOUTH  DAILY  Qty: 90 tablet, Refills: 3    Comments: Requesting 1 year supply  Associated Diagnoses: Acquired hypothyroidism      Magnesium 250 MG TABS Take 1 tablet by mouth daily      metoprolol succinate (TOPROL-XL) 50 mg 24 hr tablet Take 50 mg by mouth 3 tablets in the am and 3 tablets in the pm      Multiple Vitamins-Minerals (CENTRUM ULTRA WOMENS) TABS Take 1 tablet by mouth      Multiple Vitamins-Minerals (PRESERVISION AREDS 2+MULTI VIT PO) Take 2 tablets by mouth      potassium chloride (KCl) 2 mEq/mL SOLN Pt taking half tablet daily      simvastatin (ZOCOR) 40 mg tablet Take 40 mg by mouth      VITAMIN D PO Take by mouth      warfarin (COUMADIN) 5 mg tablet Take 1/2 to 1 tablet daily or as directed  Zinc Sulfate (ZINC 15 PO) Take by mouth      VITAMIN A PO Take 1 tablet by mouth           No discharge procedures on file      PDMP Review     None          ED Provider  Electronically Signed by           Crescencio Adhikari DO  02/03/21 0930

## 2021-02-01 NOTE — ASSESSMENT & PLAN NOTE
Patient reports taking OTC eye drops-> given artificial tears PRN  · Preservision not available in pharmacy  · Reports receiving a medication from her retinal specialist every 2 weeks

## 2021-02-01 NOTE — ASSESSMENT & PLAN NOTE
[id number]  On coumadin  Follows with [facility] cardiology (Dr Julisa Kauffman visit 8/11/2020)  · Continue home metoprolol 150 mg p o   BID  · Telemetry  · Start patient on 2 5 mg coumadin daily, and monitor INR  · Aware patient on antibiotics which may affect P450 metabolism  · INR 2 06 on 2/2, continue current regimen

## 2021-02-01 NOTE — ASSESSMENT & PLAN NOTE
Lab Results   Component Value Date    EGFR 27 02/01/2021    EGFR 39 0 12/09/2016    CREATININE 1 70 (H) 02/01/2021    CREATININE 1 42 (H) 11/02/2018    CREATININE 1 31 (H) 12/09/2016   ·

## 2021-02-01 NOTE — ASSESSMENT & PLAN NOTE
Lab Results   Component Value Date    EGFR 36 02/02/2021    EGFR 27 02/01/2021    EGFR 39 0 12/09/2016    CREATININE 1 33 (H) 02/02/2021    CREATININE 1 70 (H) 02/01/2021    CREATININE 1 42 (H) 11/02/2018   · Possible acute on chronic injury  · Patient receive 1L NS in ED  · Continue to monitor renal function  · LR 120ml/hr discontinued  · Correct electrolyte abnormalities  · Hold Diovan/losartan, consider restarting tomorrow if renal function continues to improve

## 2021-02-01 NOTE — ED NOTES
Patient daughter Latrell Landry on the phone, concerned that patient has been laying around and not doing anything to take care of herself over the past few months  States that she would like her mother to get some physical therapy       Milana Chaves RN  02/01/21 [id number]

## 2021-02-01 NOTE — ASSESSMENT & PLAN NOTE
Lab Results   Component Value Date    HGBA1C 5 8 09/05/2019       No results for input(s): POCGLU in the last 72 hours      Blood Sugar Average: Last 72 hrs:  BGC today 258  Start patient on correction scale  Blood sugar has been WNL

## 2021-02-01 NOTE — ASSESSMENT & PLAN NOTE
Right great toe  - sensitivity of left great toe reported today, no discharge/swelling appreciated  - continue to monitor for infection

## 2021-02-01 NOTE — ASSESSMENT & PLAN NOTE
Home levothyroxine 112mcg, consider change in dose  · TSH low at 0 322, dose reduced to 100mcg daily

## 2021-02-01 NOTE — ASSESSMENT & PLAN NOTE
Patient presents with new onset weakness with inability to rise from toilet with 1 month of fatigue, reduced appetite, and weight loss  Etiology unknown  In context of patient with newly diagnosed vulvar intraepithelial neoplasia, has history of tobacco use, history of diverticulosis, uncontrolled diabetes, and arrhythmia    · CT chest, abdomen, pelvis:  Right-sided pulmonary nodule 2x1 3cm and enlarged arnulfo carinal lymph, Sigmoid fat stranding and enlarged left inguinal lymph node  · Abnormal UA (follow up urine culture)  · WBC: 23 89, lactate 2 1  · TSH 0 322  ·   · Hypercalcemia: 10 3 corrected to 11 6  · In ED patient started on Cipro/flagyl x1   · Fall precautions  · Continue Abx  · Follow up blood and Urine cultures  · Patient expressed preference for outpatient investigation/management for malignancy  · Insulin correction scale - BGC remains well controlled last was 92  · CBC - reduce leukocytosis and concomitant drop in hemoglobin and platelets  · Patient feels stronger today

## 2021-02-01 NOTE — ASSESSMENT & PLAN NOTE
Potential biopsy  · IR consulted discontinued  · Patient expresses she does not wish to undergo biopsy this  · She is aware she cannot have biopsy concomitantly with vulvar mass surgery

## 2021-02-01 NOTE — H&P
H&P Exam - Rian Soto 80 y o  female MRN: [id number]    Unit/Bed#: [address] [place] [address] Encounter: [id number]    Assessment:  27-year-old female with PMH vulvar intraepithelial neoplasia, BCC, hypothyroid, diverticulosis, hypothyroidism, AFib, sick sinus, pacemaker presenting for new onset generalized weakness  Plan:  * Generalized muscle weakness  Assessment & Plan  Patient presents with new onset weakness with inability to rise from toilet with 1 month of fatigue, reduced appetite, and weight loss  Etiology unknown  In context of patient with newly diagnosed vulvar intraepithelial neoplasia, has history of tobacco use, history of diverticulosis, uncontrolled diabetes, and arrhythmia  · CT chest, abdomen, pelvis:  Right-sided pulmonary nodule 2x1 3cm and enlarged arnulfo carinal lymph, Sigmoid fat stranding and enlarged left inguinal lymph node  · Abnormal UA (follow up urine culture)  · WBC: 23 89, lactate 2 1  · TSH 0 322  ·   · Hypercalcemia: 10 3 corrected to 11 6  · In ED patient started on Cipro/flagyl x1   Orders  · Fall precautions  · Continue Abx  · Follow up blood and Urine cultures  · Further evaluation for malignancy  · Insulin correction scale  · CBC      Vulvar intraepithelial neoplasia, differentiated-type (dVIN)  Assessment & Plan  Patient recently seen in Dr Christensen Pac office on 01/11/2021 and biopsy was taken  Results revealed vulvar intraepithelial neoplasia on 01/19/2021  Biopsy results shown below    Vulvar intraepithelial neoplasia, differentiated (simplex) type (dVIN), 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  -- Confirmed by positive p53 and negative p16/Ki-67 (increased proliferative index) immunostains  -- Associated spongiosis and inflammation (mostly neutrophils and eosinophils); special stain for fungus       (PAS) negative  - No definitive invasive carcinoma identified   Note: Although no definitive invasive carcinoma is identified, the biopsy is superficial and may not be representative of the entire lesion  Patient has appointment scheduled with Dr Renate Barrett (gynecology Oncology) on 2/4     - consider consult Hematology     Pulmonary nodule  Assessment & Plan  Potential biopsy  · IR consulted : help appreciated    Diverticulosis with fat stranding and leukocytosis  Assessment & Plan  CT: Diverticulosis coli  Possible segmental perisigmoid fat stranding may be due to acute diverticulitis, although evaluation is limited  · Clear liquid diet  · LR 120ml/hour maintenance fluid  · Continue Cipro 400mg IV q12h and metronidazole 500mg IV q8H  · Trend vitals, WBCs, and exam findings/symptoms    Acute on chronic kidney failure [facility])  Assessment & Plan  Lab Results   Component Value Date    EGFR 27 02/01/2021    EGFR 39 0 12/09/2016    CREATININE 1 70 (H) 02/01/2021    CREATININE 1 42 (H) 11/02/2018    CREATININE 1 31 (H) 12/09/2016   · Possible acute on chronic injury  · Patient receive 1L NS in ED  · Continue to monitor renal function  · LR 120ml/hr  · CLD  · Hold Diovan/losartan    Type 2 diabetes mellitus with diabetic chronic kidney disease ([facility] [address] )  Assessment & Plan  Lab Results   Component Value Date    HGBA1C 5 8 09/05/2019       No results for input(s): POCGLU in the last 72 hours  Blood Sugar Average: Last 72 hrs:  BGC today 258  Start patient on correction scale    Chronic kidney disease, stage 2 (mild)  Assessment & Plan  Lab Results   Component Value Date    EGFR 27 02/01/2021    EGFR 39 0 12/09/2016    CREATININE 1 70 (H) 02/01/2021    CREATININE 1 42 (H) 11/02/2018    CREATININE 1 31 (H) 12/09/2016   ·     Paroxysmal atrial fibrillation ([facility] [address] )  Assessment & Plan  [id number]  On coumadin  Follows with [facility] cardiology (Dr Rosales Starch visit 8/11/2020)  · Continue home metoprolol 150 mg p o   BID  · Telemetry  · Start patient on 2 5 mg coumadin daily, and monitor INR    Acquired hypothyroidism  Assessment & Plan  Home levothyroxine 112mcg, consider change in dose  · TSH low at 0 322, reduce dose to 100mcg daily      Macular degeneration  Assessment & Plan  Patient reports taking OTC eye drops  · Preservision and Aritis 2, follow up with pharmacy  · Reports receiving a medication from her retinal specialist every 2 weeks    Ingrown toenail  Assessment & Plan  Right great toe  - monitor for infection        History of Present Illness   72-year-old female presents to emergency department for 1 month history of fatigue  Patient reports today while trying to get from the toilet she felt drained and unable to stand  Patient reports this was a generalized feeling, denies one-sided weakness, dysarthria, change in bowel or bladder habits  Patient had recently been seen in outpatient setting regarding vulvar mass which causes her intermittent pain and bleeding  Patient has smoking history and weight loss/reduced appetite  Review of Systems   Constitutional: Positive for appetite change (Early satiety), chills, fatigue and unexpected weight change  Negative for diaphoresis and fever  HENT: Negative for congestion, nosebleeds and rhinorrhea  Eyes: Positive for visual disturbance  Respiratory: Positive for shortness of breath ( exertional)  Negative for chest tightness and wheezing  Apnea:  degeneration  Cardiovascular: Positive for palpitations ( intermittently but not at present)  Negative for chest pain and leg swelling  Gastrointestinal: Negative for abdominal pain, blood in stool ( dark stool 1 month ago), constipation ( patient reports straining with bowel movements but is regular every other day), diarrhea and nausea  Endocrine: Positive for cold intolerance  Genitourinary: Positive for flank pain ( poorly described discomfort), genital sores, vaginal bleeding ( from mass) and vaginal pain ( positional)  Negative for difficulty urinating, dysuria and urgency  Vaginal mass   Musculoskeletal: Negative for back pain     Skin: Negative for color change and pallor  Neurological: Negative for dizziness and light-headedness  Hematological: Negative for adenopathy  Does not bruise/bleed easily  Psychiatric/Behavioral: Negative for behavioral problems  Historical Information   Past Medical History:   Diagnosis Date    Atrial fibrillation ([address] )     Basal cell carcinoma     Hyperlipidemia     Hypothyroid     Kidney disease      Past Surgical History:   Procedure Laterality Date    CARDIAC PACEMAKER PLACEMENT      MOHS SURGERY  1998    shaving lesion, on face below left side of nose basal cell carcinoma     Social History   Social History     Substance and Sexual Activity   Alcohol Use Yes    Alcohol/week: 0 0 - 1 0 standard drinks     Social History     Substance and Sexual Activity   Drug Use No     Social History     Tobacco Use   Smoking Status Former Smoker    Quit date:     Years since quittin 1   Smokeless Tobacco Never Used     E-Cigarette Use: Never User     E-Cigarette/Vaping Substances       Family History:   Family History   Problem Relation Age of Onset    Alzheimer's disease Mother     Parkinsonism Mother         symptomatic    Macular degeneration Mother     Stroke Father     Vascular Disease Father     Heart disease Maternal Uncle         cardiac    COPD Son     Heart disease Maternal Uncle     Heart disease Maternal Uncle     Heart disease Maternal Uncle     Heart disease Maternal Uncle        Meds/Allergies   all medications and allergies reviewed  Allergies   Allergen Reactions    Other Other (See Comments)     Category: Adverse Reaction; Annotation - [id number]: HORSE SERUM - Pt states was used several years ago in tetanus boosters when she was a child         Objective   First Vitals:   Blood Pressure: 111/52 (21 1102)  Pulse: 86 (21 1102)  Temperature: 97 6 °F (36 4 °C) (21 1102)  Temp Source: Oral (21 1102)  Respirations: 14 (21 1102)  Height: 5' 8" (172 7 cm) (21 1545)  Weight - Scale: 79 8 kg (176 lb) (02/01/21 1545)  SpO2: 98 % (02/01/21 1102)    Current Vitals:   Blood Pressure: 144/64 (02/01/21 1545)  Pulse: 72 (02/01/21 1545)  Temperature: 98 4 °F (36 9 °C) (02/01/21 1545)  Temp Source: Tympanic (02/01/21 1545)  Respirations: 16 (02/01/21 1545)  Height: 5' 8" (172 7 cm) (02/01/21 1545)  Weight - Scale: 79 8 kg (176 lb) (02/01/21 1545)  SpO2: 99 % (02/01/21 1545)    No intake or output data in the 24 hours ending 02/01/21 1806    Invasive Devices     Peripheral Intravenous Line            Peripheral IV 02/01/21 Left Antecubital less than 1 day                Physical Exam  Exam conducted with a chaperone present  Constitutional:       General: She is not in acute distress  Appearance: Normal appearance  She is normal weight  She is not ill-appearing, toxic-appearing or diaphoretic  HENT:      Head: Normocephalic and atraumatic  Mouth/Throat:      Mouth: Mucous membranes are dry  Pharynx: Oropharynx is clear  No oropharyngeal exudate or posterior oropharyngeal erythema  Comments: Scar over left lip s/p BCC excision  Eyes:      Extraocular Movements: Extraocular movements intact  Conjunctiva/sclera: Conjunctivae normal    Cardiovascular:      Rate and Rhythm: Normal rate and regular rhythm  Pulses: Normal pulses  Heart sounds: Normal heart sounds  No murmur  No friction rub  No gallop  Pulmonary:      Effort: Pulmonary effort is normal  No respiratory distress  Breath sounds: Normal breath sounds  No stridor  No wheezing, rhonchi or rales  Abdominal:      General: Abdomen is flat  Bowel sounds are normal       Palpations: Abdomen is soft  Tenderness: There is abdominal tenderness (Left upper and lower quadrants)  There is no right CVA tenderness, left CVA tenderness, guarding or rebound  Genitourinary:     Labia:         Left: Tenderness and lesion (mass) present  No injury             Comments: 3cm, tender, parasagittal left labial mass  No purulence, erythema, active bleeding or odor detected  Musculoskeletal:         General: No tenderness  Right lower leg: No edema  Left lower leg: No edema  Lymphadenopathy:      Head:      Right side of head: No submandibular adenopathy  Left side of head: No submandibular adenopathy  Cervical: No cervical adenopathy  Upper Body:      Right upper body: No axillary adenopathy  Left upper body: No axillary adenopathy  Lower Body: No right inguinal adenopathy  No left inguinal adenopathy  Skin:     General: Skin is warm and dry  Coloration: Skin is not pale  Findings: No erythema or rash  Neurological:      General: No focal deficit present  Mental Status: She is alert  Mental status is at baseline  Sensory: No sensory deficit        Coordination: Coordination normal    Psychiatric:         Mood and Affect: Mood normal          Lab Results:   Results for orders placed or performed during the hospital encounter of 02/01/21   CBC and differential   Result Value Ref Range    WBC 23 89 (H) 4 31 - 10 16 Thousand/uL    RBC 3 87 3 81 - 5 12 Million/uL    Hemoglobin 11 0 (L) 11 5 - 15 4 g/dL    Hematocrit 36 5 34 8 - 46 1 %    MCV 94 82 - 98 fL    MCH 28 4 26 8 - 34 3 pg    MCHC 30 1 (L) 31 4 - 37 4 g/dL    RDW 14 3 11 6 - 15 1 %    MPV 10 9 8 9 - 12 7 fL    Platelets 131 143 - 963 Thousands/uL    nRBC 0 /100 WBCs    Neutrophils Relative 84 (H) 43 - 75 %    Immat GRANS % 1 0 - 2 %    Lymphocytes Relative 7 (L) 14 - 44 %    Monocytes Relative 5 4 - 12 %    Eosinophils Relative 2 0 - 6 %    Basophils Relative 1 0 - 1 %    Neutrophils Absolute 20 43 (H) 1 85 - 7 62 Thousands/µL    Immature Grans Absolute 0 17 0 00 - 0 20 Thousand/uL    Lymphocytes Absolute 1 58 0 60 - 4 47 Thousands/µL    Monocytes Absolute 1 23 (H) 0 17 - 1 22 Thousand/µL    Eosinophils Absolute 0 37 0 00 - 0 61 Thousand/µL    Basophils Absolute 0 11 (H) 0 00 - 0 10 Thousands/µL   Comprehensive metabolic panel   Result Value Ref Range    Sodium 135 (L) 136 - 145 mmol/L    Potassium 3 7 3 5 - 5 3 mmol/L    Chloride 101 100 - 108 mmol/L    CO2 26 21 - 32 mmol/L    ANION GAP 8 4 - 13 mmol/L    BUN 20 5 - 25 mg/dL    Creatinine 1 70 (H) 0 60 - 1 30 mg/dL    Glucose 258 (H) 65 - 140 mg/dL    Calcium 10 3 (H) 8 3 - 10 1 mg/dL    Corrected Calcium 11 6 (H) 8 3 - 10 1 mg/dL    AST 25 5 - 45 U/L    ALT 20 12 - 78 U/L    Alkaline Phosphatase 102 46 - 116 U/L    Total Protein 6 9 6 4 - 8 2 g/dL    Albumin 2 4 (L) 3 5 - 5 0 g/dL    Total Bilirubin 0 60 0 20 - 1 00 mg/dL    eGFR 27 ml/min/1 73sq m   Magnesium   Result Value Ref Range    Magnesium 1 9 1 6 - 2 6 mg/dL   Troponin I   Result Value Ref Range    Troponin I 0 04 <=0 04 ng/mL   UA (URINE) with reflex to Scope   Result Value Ref Range    Color, UA Jeanna     Clarity, UA Cloudy     Specific [place], UA 1 025 1 000 - 1 030    pH, UA 6 0 5 0, 5 5, 6 0, 6 5, 7 0, 7 5, 8 0, 8 5, 9 0    Leukocytes, UA Moderate (A) Negative    Nitrite, UA Negative Negative    Protein,  (2+) (A) Negative mg/dl    Glucose, UA Negative Negative mg/dl    Ketones, UA Negative Negative mg/dl    Urobilinogen, UA 0 2 0 2, 1 0 E U /dl E U /dl    Bilirubin, UA Interference- unable to analyze (A) Negative    Blood, UA Large (A) Negative   TSH   Result Value Ref Range    TSH 3RD GENERATON 0 322 (L) 0 358 - 3 740 uIU/mL   Protime-INR   Result Value Ref Range    Protime 20 0 (H) 11 6 - 14 5 seconds    INR 1 73 (H) 0 84 - 1 19   APTT   Result Value Ref Range    PTT 33 23 - 37 seconds   Lactic acid   Result Value Ref Range    LACTIC ACID 2 1 (HH) 0 5 - 2 0 mmol/L   Urine Microscopic   Result Value Ref Range    RBC, UA 20-30 (A) None Seen, 0-1, 1-2, 2-4, 0-5 /hpf    WBC, UA Innumerable (A) None Seen, 0-1, 1-2, 0-5, 2-4 /hpf    Epithelial Cells Moderate (A) None Seen, Occasional /hpf    Bacteria, UA Innumerable (A) None Seen, Occasional /hpf    OTHER OBSERVATIONS Transitional Epithelial Cells        Imaging:   CT chest abdomen pelvis wo contrast   Final Result      CHEST:   1  Irregular pulmonary nodule in the medial right upper lobe abutting the superior mediastinum measuring 2 0 x 1 3 cm  Based on current Fleischner Society 2017 Guidelines on incidental pulmonary nodule, PET/CT scan evaluation or tissue sampling may be    considered for further evaluation  2   Additional groundglass nodules measuring up to 7 mm above  3   Mild emphysema  4   Nonspecific enlarged precarinal lymph node  ABDOMEN AND PELVIS:   1  Diverticulosis coli  Possible segmental perisigmoid fat stranding may be due to acute diverticulitis, although evaluation is limited  2   Left greater than right nonspecific perinephric fat stranding, grossly similar to prior  No hydronephrosis  3  Prominent to mildly enlarged left inguinal lymph nodes, nonspecific  I personally discussed this study with Valentine Polanco on 2/1/2021 at 2:03 PM                   Workstation performed: [id number]         XR chest 1 view portable   Final Result      No focal consolidation, pleural effusion, or pneumothorax  Workstation performed: [id number]             EKG, Pathology, and Other Studies: EKG A fib with RBBB    Code Status: Level 3 - DNAR and DNI  Advance Directive and Living Will:      Power of :    POLST:      Counseling / Coordination of Care: Total floor / unit time spent today 30 minutes

## 2021-02-01 NOTE — ASSESSMENT & PLAN NOTE
Patient recently seen in Dr Michaelle Moseley office on 01/11/2021 and biopsy was taken  Results revealed vulvar intraepithelial neoplasia on 01/19/2021  Biopsy results shown below    Vulvar intraepithelial neoplasia, differentiated (simplex) type (dVIN), 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  -- Confirmed by positive p53 and negative p16/Ki-67 (increased proliferative index) immunostains  -- Associated spongiosis and inflammation (mostly neutrophils and eosinophils); special stain for fungus       (PAS) negative  - No definitive invasive carcinoma identified  Note: Although no definitive invasive carcinoma is identified, the biopsy is superficial and may not be representative of the entire lesion  Patient has appointment scheduled with Dr Antwon Salgado (gynecology Oncology) on 2/4  Patient is reluctant to further workup for pulmonary nodule this time  Would like to try to get patient to make this appointment   Monitor leukocytosis and symptoms in determining disposition

## 2021-02-01 NOTE — ASSESSMENT & PLAN NOTE
CT: Diverticulosis coli  Possible segmental perisigmoid fat stranding may be due to acute diverticulitis, although evaluation is limited    · Surgical soft diet  · LR discontinued  · Continue Cipro 400mg p o  q12h and metronidazole 500mg p o  q8H  · Trend vitals, WBCs, and exam findings/symptoms  · Leukocytisis improved 23 on 2/1 to 18 on 2/2  · Patient stable and asking to advance diet

## 2021-02-02 PROBLEM — D64.9 ANEMIA: Status: ACTIVE | Noted: 2021-01-01

## 2021-02-02 NOTE — UTILIZATION REVIEW
Initial Clinical Review    Admission: Date/Time/Statement:   Admission Orders (From admission, onward)     Ordered        02/01/21 1414  Inpatient Admission  Once                   Orders Placed This Encounter   Procedures    Inpatient Admission     Standing Status:   Standing     Number of Occurrences:   1     Order Specific Question:   Level of Care     Answer:   Med Surg [16]     Order Specific Question:   Estimated length of stay     Answer:   More than 2 Midnights     Order Specific Question:   Certification     Answer:   I certify that inpatient services are medically necessary for this patient for a duration of greater than two midnights  See H&P and MD Progress Notes for additional information about the patient's course of treatment  ED Arrival Information     Expected Arrival Acuity Means of Arrival Escorted By Service Admission Type    - 2/1/2021 10:57 Urgent Ambulance [address] Urgent    Arrival Complaint    weakness        Chief Complaint   Patient presents with    Weakness - Generalized     patient c/o weakness for a month that has gotten progressively worse  States she went to the bathroom today and could not get up  Assessment/Plan: 66-year-old female with PMH vulvar intraepithelial neoplasia, BCC, hypothyroid, diverticulosis, hypothyroidism, AFib, sick sinus, pacemaker presenting for new onset generalized weakness  In ED CT chest, abdomen, pelvis:  Right-sided pulmonary nodule 2x1 3cm and enlarged arnulfo carinal lymph, Sigmoid fat stranding and enlarged left inguinal lymph node  may be due to diverticulitis clear liquid diet continue abx ,  UA abnormal send for cx, bld cx , wbc 23 89 lactic acid 2 1 Admitted Inpatient Diverticulitis continue IV Cipro,IV Flagyl, IVF  Hypercalcemia 10 3 , insulin correction scale cbc  Newly dx Vulvar intraepithelial neoplasia consider consult hematology  KIKE continue ivf monitor renal fx  Hold diovan losartan   Paroxysamal afib on coumadin telemetry   ED Triage Vitals   Temperature Pulse Respirations Blood Pressure SpO2   02/01/21 1102 02/01/21 1102 02/01/21 1102 02/01/21 1102 02/01/21 1102   97 6 °F (36 4 °C) 86 14 111/52 98 %      Temp Source Heart Rate Source Patient Position - Orthostatic VS BP Location FiO2 (%)   02/01/21 1102 02/01/21 1102 02/01/21 1102 02/01/21 1102 --   Oral Monitor Lying Left arm       Pain Score       02/01/21 1548       No Pain          Wt Readings from Last 1 Encounters:   02/01/21 79 8 kg (176 lb)     Additional Vital Signs:   02/02/21 0030  99 1 °F (37 3 °C)  73  18  130/61  88  99 %  None (Room air)  Lying   02/01/21 1912  99 1 °F (37 3 °C)  74  16  138/61  --  98 %  --  --   02/01/21 1545  98 4 °F (36 9 °C)  72  16  144/64  92  99 %  None (Room air)  Lying   02/01/21 1437  98 °F (36 7 °C)  72  16  116/52  --  98 %  None (Room air)         Pertinent Labs/Diagnostic Test Results:   2/1 CXR No focal consolidation, pleural effusion, or pneumothorax  2/1 CT chest abdomen pelvis CHEST:  1  Irregular pulmonary nodule in the medial right upper lobe abutting the superior mediastinum measuring 2 0 x 1 3 cm  Based on current Fleischner Society 2017 Guidelines on incidental pulmonary nodule, PET/CT scan evaluation or tissue sampling may be   considered for further evaluation  2   Additional groundglass nodules measuring up to 7 mm above  3   Mild emphysema  4   Nonspecific enlarged precarinal lymph node  ABDOMEN AND PELVIS:  1  Diverticulosis coli  Possible segmental perisigmoid fat stranding may be due to acute diverticulitis, although evaluation is limited  2   Left greater than right nonspecific perinephric fat stranding, grossly similar to prior  No hydronephrosis  3  Prominent to mildly enlarged left inguinal lymph nodes, nonspecific      Results from last 7 days   Lab Units 02/02/21  0444 02/01/21  1124   WBC Thousand/uL 18 28* 23 89*   HEMOGLOBIN g/dL 9 2* 11 0*   HEMATOCRIT % 29 7* 36 5   PLATELETS Thousands/uL 245 305   NEUTROS ABS Thousands/µL 14 47* 20 43*     Results from last 7 days   Lab Units 02/02/21  0444 02/01/21  1124   SODIUM mmol/L 135* 135*   POTASSIUM mmol/L 3 6 3 7   CHLORIDE mmol/L 105 101   CO2 mmol/L 25 26   ANION GAP mmol/L 5 8   BUN mg/dL 22 20   CREATININE mg/dL 1 33* 1 70*   EGFR ml/min/1 73sq m 36 27   CALCIUM mg/dL 9 3 10 3*   MAGNESIUM mg/dL 1 7 1 9   PHOSPHORUS mg/dL 3 0  --      Results from last 7 days   Lab Units 02/02/21  0444 02/01/21  1124   AST U/L 16 25   ALT U/L 16 20   ALK PHOS U/L 83 102   TOTAL PROTEIN g/dL 5 7* 6 9   ALBUMIN g/dL 2 0* 2 4*   TOTAL BILIRUBIN mg/dL 0 50 0 60     Results from last 7 days   Lab Units 02/02/21  0753 02/01/21  2050 02/01/21  1720   POC GLUCOSE mg/dl 92 125 123     Results from last 7 days   Lab Units 02/02/21  0444 02/01/21  1124   GLUCOSE RANDOM mg/dL 104 258*     Results from last 7 days   Lab Units 02/01/21  1124   TROPONIN I ng/mL 0 04     Results from last 7 days   Lab Units 02/02/21  0445 02/01/21  1130   PROTIME seconds 22 9* 20 0*   INR  2 06* 1 73*   PTT seconds  --  33     Results from last 7 days   Lab Units 02/01/21  1124   TSH 3RD GENERATON uIU/mL 0 322*     Results from last 7 days   Lab Units 02/01/21  1923 02/01/21  1415   LACTIC ACID mmol/L 2 0 2 1*     Results from last 7 days   Lab Units 02/01/21  1444   CLARITY UA  Cloudy   COLOR UA  Jeanna   SPEC GRAV UA  1 025   PH UA  6 0   GLUCOSE UA mg/dl Negative   KETONES UA mg/dl Negative   BLOOD UA  Large*   PROTEIN UA mg/dl 100 (2+)*   NITRITE UA  Negative   BILIRUBIN UA  Interference- unable to analyze*   UROBILINOGEN UA E U /dl 0 2   LEUKOCYTES UA  Moderate*   WBC UA /hpf Innumerable*   RBC UA /hpf 20-30*   BACTERIA UA /hpf Innumerable*   EPITHELIAL CELLS WET PREP /hpf Moderate*     Results from last 7 days   Lab Units 02/01/21  1415   BLOOD CULTURE  Received in Microbiology Lab  Culture in Progress  Received in Microbiology Lab  Culture in Progress       ED Treatment:   Medication Administration from 02/01/2021 1056 to 02/01/2021 1525       Date/Time Order Dose Route Action Action by Comments     02/01/2021 1416 metroNIDAZOLE (FLAGYL) IVPB (premix) 500 mg 100 mL 500 mg Intravenous New 82 Brianna Aguilar RN      02/01/2021 1417 sodium chloride 0 9 % bolus 1,000 mL 1,000 mL Intravenous New Fredis Ludwig RN         Past Medical History:   Diagnosis Date    Atrial fibrillation ([address] )     Basal cell carcinoma 1999    Hyperlipidemia     Hypothyroid     Kidney disease      Present on Admission:   Acquired hypothyroidism   Paroxysmal atrial fibrillation (HCC)   Cardiac resynchronization therapy pacemaker (CRT-P) in place   Chronic kidney disease, stage 2 (mild)   Peripheral arteriosclerosis (HCC)   Type 2 diabetes mellitus with diabetic peripheral angiopathy without gangrene (HCC)   Type 2 diabetes mellitus with diabetic chronic kidney disease (HCC)   Macular degeneration   Ingrown toenail      Admitting Diagnosis: Diverticulitis [K57 92]  Weakness [R53 1]  Generalized weakness [R53 1]  Age/Sex: 80 y o  female  Admission Orders:  Tele mon  Cbc diff cmp  Mg phos pt inr  Urine cx  Clear liquid   Scheduled Medications:  ciprofloxacin, 400 mg, Intravenous, Q24H  insulin lispro, 1-6 Units, Subcutaneous, TID AC  insulin lispro, 1-6 Units, Subcutaneous, HS  levothyroxine, 100 mcg, Oral, Early Morning  metoprolol succinate, 150 mg, Oral, BID  metroNIDAZOLE, 500 mg, Intravenous, Q8H  multivitamin-minerals, 1 tablet, Oral, Daily  pravastatin, 80 mg, Oral, Daily With Dinner  saccharomyces boulardii, 250 mg, Oral, BID  warfarin, 2 5 mg, Oral, Daily (warfarin)      Continuous IV Infusions:  lactated ringers, 120 mL/hr, Intravenous, Continuous      PRN Meds:  ALPRAZolam, 0 5 mg, Oral, HS PRN  glycerin-hypromellose-, 1 drop, Both Eyes, Q6H PRN        INPATIENT CONSULT TO IR    Network Utilization Review Department  ATTENTION: Please call with any questions or concerns to [phone] and carefully listen to the prompts so that you are directed to the right person  All voicemails are confidential   Erlinda Lainez all requests for admission clinical reviews, approved or denied determinations and any other requests to dedicated fax number below belonging to the campus where the patient is receiving treatment   List of dedicated fax numbers for the Facilities:  [address] DENIALS (Administrative/Medical Necessity) [phone]   [address] (Maternity/NICU/Pediatrics) [phone]   [address] Vivek Nelson 5157 (  Akbar Thomas "Lana" 103) [id number] [place] [id number] Christine Mendel Denise 1481 [address] [place] [address] [phone]

## 2021-02-02 NOTE — PROGRESS NOTES
Progress Note - Jabier Clayton 1935, 80 y o  female MRN: [id number]    Unit/Bed#: [address] [place] [address] Encounter: [id number]    Primary Care Provider: Emigdio Shearer DO   Date and time admitted to hospital: 2/1/2021 10:59 AM        * Generalized muscle weakness  Assessment & Plan  Patient presents with new onset weakness with inability to rise from toilet with 1 month of fatigue, reduced appetite, and weight loss  Etiology unknown  In context of patient with newly diagnosed vulvar intraepithelial neoplasia, has history of tobacco use, history of diverticulosis, uncontrolled diabetes, and arrhythmia  · CT chest, abdomen, pelvis:  Right-sided pulmonary nodule 2x1 3cm and enlarged arnulfo carinal lymph, Sigmoid fat stranding and enlarged left inguinal lymph node  · Abnormal UA (follow up urine culture)  · WBC: 23 89, lactate 2 1  · TSH 0 322  ·   · Hypercalcemia: 10 3 corrected to 11 6  · In ED patient started on Cipro/flagyl x1   · Fall precautions  · Continue Abx  · Follow up blood and Urine cultures  · Patient expressed preference for outpatient investigation/management for malignancy  · Insulin correction scale - BGC remains well controlled last was 92  · CBC - reduce leukocytosis and concomitant drop in hemoglobin and platelets  · Patient feels stronger today      Vulvar intraepithelial neoplasia, differentiated-type (dVIN)  Assessment & Plan  Patient recently seen in Dr Brayden Sierra on 01/11/2021 and biopsy was taken  Results revealed vulvar intraepithelial neoplasia on 01/19/2021  Biopsy results shown below    Vulvar intraepithelial neoplasia, differentiated (simplex) type (dVIN), 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  -- Confirmed by positive p53 and negative p16/Ki-67 (increased proliferative index) immunostains  -- Associated spongiosis and inflammation (mostly neutrophils and eosinophils); special stain for fungus       (PAS) negative  - No definitive invasive carcinoma identified   Note: Although no definitive invasive carcinoma is identified, the biopsy is superficial and may not be representative of the entire lesion  Patient has appointment scheduled with Dr Anabella Key (gynecology Oncology) on 2/4  Patient is reluctant to further workup for pulmonary nodule this time  Would like to try to get patient to make this appointment  Monitor leukocytosis and symptoms in determining disposition     Barrier cream to intertriginous irritation PRN    Pulmonary nodule  Assessment & Plan  Potential biopsy  · IR consulted discontinued  · Patient expresses she does not wish to undergo biopsy this  · She is aware she cannot have biopsy concomitantly with vulvar mass surgery    Diverticulosis with fat stranding and leukocytosis  Assessment & Plan  CT: Diverticulosis coli  Possible segmental perisigmoid fat stranding may be due to acute diverticulitis, although evaluation is limited  · Clear liquid diet  · LR 120ml/hour maintenance fluid  · Continue Cipro 400mg IV q12h and metronidazole 500mg IV q8H  · Trend vitals, WBCs, and exam findings/symptoms  · Leukocytisis improved 23 on 2/1 to 18 on 2/2  · Patient stable and asking to advance diet    Acute on chronic kidney failure [facility])  Assessment & Plan  Lab Results   Component Value Date    EGFR 36 02/02/2021    EGFR 27 02/01/2021    EGFR 39 0 12/09/2016    CREATININE 1 33 (H) 02/02/2021    CREATININE 1 70 (H) 02/01/2021    CREATININE 1 42 (H) 11/02/2018   · Possible acute on chronic injury  · Patient receive 1L NS in ED  · Continue to monitor renal function  · LR 120ml/hr discontinued  · Correct electrolyte abnormalities  · Hold Diovan/losartan, consider restarting tomorrow if renal function continues to improve    Type 2 diabetes mellitus with diabetic chronic kidney disease ([facility] [address] )  Assessment & Plan  Lab Results   Component Value Date    HGBA1C 5 8 09/05/2019       No results for input(s): POCGLU in the last 72 hours      Blood Sugar Average: Last 72 hrs:  BGC today 258  Start patient on correction scale  Blood sugar has been WNL    Chronic kidney disease, stage 2 (mild)  Assessment & Plan  Lab Results   Component Value Date    EGFR 27 02/01/2021    EGFR 39 0 12/09/2016    CREATININE 1 70 (H) 02/01/2021    CREATININE 1 42 (H) 11/02/2018    CREATININE 1 31 (H) 12/09/2016   ·     Paroxysmal atrial fibrillation ([address] )  Assessment & Plan  [id number]  On coumadin  Follows with [facility] cardiology (Dr Spring Hemphill visit 8/11/2020)  · Continue home metoprolol 150 mg p o  BID  · Telemetry  · Start patient on 2 5 mg coumadin daily, and monitor INR  · Aware patient on antibiotics which may affect P450 metabolism  · INR 2 06 on 2/2, continue current regimen    Acquired hypothyroidism  Assessment & Plan  Home levothyroxine 112mcg, consider change in dose  · TSH low at 0 322, dose reduced to 100mcg daily      Macular degeneration  Assessment & Plan  Patient reports taking OTC eye drops-> given artificial tears PRN  · Preservision not available in pharmacy  · Reports receiving a medication from her retinal specialist every 2 weeks    Ingrown toenail  Assessment & Plan  Right great toe  - sensitivity of left great toe reported today, no discharge/swelling appreciated  - continue to monitor for infection    Anemia  Assessment & Plan  11 on 2/1 -> 9 2 2/2  Suspected dilutional: concomitant drops in WBCs and platelets    History of sick sinus syndrome  Assessment & Plan  Pace maker in place    Type 2 diabetes mellitus with diabetic peripheral angiopathy without gangrene [facility])  Assessment & Plan  Lab Results   Component Value Date    HGBA1C 5 8 09/05/2019       Recent Labs     02/01/21  1720 02/01/21  2050 02/02/21  0753 02/02/21  1143   POCGLU 123 125 92 106       Blood Sugar Average: Last 72 hrs:  (P) 111 5    ---------------------------------------------------------------------------------------  SUBJECTIVE  Patient seen examined at bedside    She reports no acute events overnight however she does wish to broaden her diet  She states she feels stronger than yesterday  She expressed she would rather not investigate her pulmonary nodule at this time, and states her primary concern is the vulvar lesion she would rather not know more about the lung, despite counseling on possible metastasis versus novel neoplasm  Review of Systems   Constitutional: Positive for chills and fatigue ( improved)  Negative for appetite change ( interest to advance the diet), diaphoresis and fever  HENT: Negative for congestion, nosebleeds and rhinorrhea  Eyes: Positive for visual disturbance  Respiratory: Positive for shortness of breath ( exertional)  Negative for chest tightness and wheezing  Apnea:  degeneration  Cardiovascular: Negative for chest pain, palpitations and leg swelling  Gastrointestinal: Negative for abdominal pain, blood in stool ( dark stool 1 month ago), constipation ( patient reports straining with bowel movements but is regular every other day), diarrhea and nausea  Endocrine: Positive for cold intolerance  Genitourinary: Positive for genital sores and vaginal pain ( positional)  Negative for difficulty urinating, dysuria, flank pain, urgency and vaginal bleeding  Vaginal mass   Musculoskeletal: Negative for back pain  Skin: Negative for color change and pallor  Neurological: Negative for dizziness and light-headedness  Hematological: Negative for adenopathy  Does not bruise/bleed easily  Psychiatric/Behavioral: Negative for behavioral problems         ---------------------------------------------------------------------------------------  OBJECTIVE    Vitals   Vitals:    02/01/21 1545 02/01/21 1912 02/02/21 0030 02/02/21 0911   BP: 144/64 138/61 130/61 148/67   BP Location: Right arm  Right arm Right arm   Pulse: 72 74 73 69   Resp: 16 16 18 18   Temp: 98 4 °F (36 9 °C) 99 1 °F (37 3 °C) 99 1 °F (37 3 °C) 98 8 °F (37 1 °C)   TempSrc: Tympanic  Oral Oral   SpO2: 99% 98% 99% 98%   Weight: 79 8 kg (176 lb)      Height: 5' 8" (1 727 m)        Temp (24hrs), Av 7 °F (37 1 °C), Min:98 °F (36 7 °C), Max:99 1 °F (37 3 °C)  Current: Temperature: 98 8 °F (37 1 °C)          Physical Exam  Exam conducted with a chaperone present  Constitutional:       General: She is not in acute distress  Appearance: Normal appearance  She is normal weight  She is not ill-appearing, toxic-appearing or diaphoretic  HENT:      Head: Normocephalic and atraumatic  Mouth/Throat:      Mouth: Mucous membranes are dry  Pharynx: Oropharynx is clear  No oropharyngeal exudate or posterior oropharyngeal erythema  Comments: Scar over left lip s/p BCC excision  Eyes:      Extraocular Movements: Extraocular movements intact  Conjunctiva/sclera: Conjunctivae normal    Cardiovascular:      Rate and Rhythm: Normal rate and regular rhythm  Pulses: Normal pulses  Heart sounds: Normal heart sounds  No murmur  No friction rub  No gallop  Pulmonary:      Effort: Pulmonary effort is normal  No respiratory distress  Breath sounds: Normal breath sounds  No stridor  No wheezing, rhonchi or rales  Abdominal:      General: Abdomen is flat  Bowel sounds are normal       Palpations: Abdomen is soft  Tenderness: There is abdominal tenderness (Left upper quadrant, with mild discomfort in right lower quadrant)  There is no right CVA tenderness, left CVA tenderness, guarding or rebound  Genitourinary:     Labia:         Left: Tenderness and lesion (mass) present  No injury  Comments: 3cm, tender, parasagittal left labial mass  No purulence, erythema, active bleeding or odor detected  Intertriginous region of gluteal cleft, posterior to lesion is moist and irritated   Musculoskeletal:         General: No tenderness  Right lower leg: No edema  Left lower leg: No edema  Lymphadenopathy:      Head:      Right side of head: No submandibular adenopathy  Left side of head: No submandibular adenopathy  Cervical: No cervical adenopathy  Upper Body:      Right upper body: No axillary adenopathy  Left upper body: No axillary adenopathy  Lower Body: No right inguinal adenopathy  No left inguinal adenopathy  Skin:     General: Skin is warm and dry  Coloration: Skin is not pale  Findings: No erythema or rash  Neurological:      General: No focal deficit present  Mental Status: She is alert and oriented to person, place, and time  Mental status is at baseline  Cranial Nerves: No cranial nerve deficit  Sensory: No sensory deficit  Motor: Weakness ( lower extremities 4/5 strength) present        Coordination: Coordination normal       Comments: Upper extremities 5/5 strength   Psychiatric:         Mood and Affect: Mood normal          Laboratory and Diagnostics:  Results from last 7 days   Lab Units 02/02/21  0444 02/01/21  1124   WBC Thousand/uL 18 28* 23 89*   HEMOGLOBIN g/dL 9 2* 11 0*   HEMATOCRIT % 29 7* 36 5   PLATELETS Thousands/uL 245 305   NEUTROS PCT % 77* 84*   MONOS PCT % 10 5     Results from last 7 days   Lab Units 02/02/21  0444 02/01/21  1124   SODIUM mmol/L 135* 135*   POTASSIUM mmol/L 3 6 3 7   CHLORIDE mmol/L 105 101   CO2 mmol/L 25 26   ANION GAP mmol/L 5 8   BUN mg/dL 22 20   CREATININE mg/dL 1 33* 1 70*   CALCIUM mg/dL 9 3 10 3*   GLUCOSE RANDOM mg/dL 104 258*   ALT U/L 16 20   AST U/L 16 25   ALK PHOS U/L 83 102   ALBUMIN g/dL 2 0* 2 4*   TOTAL BILIRUBIN mg/dL 0 50 0 60     Results from last 7 days   Lab Units 02/02/21  0444 02/01/21  1124   MAGNESIUM mg/dL 1 7 1 9   PHOSPHORUS mg/dL 3 0  --       Results from last 7 days   Lab Units 02/02/21  0445 02/01/21  1130   INR  2 06* 1 73*   PTT seconds  --  33      Results from last 7 days   Lab Units 02/01/21  1124   TROPONIN I ng/mL 0 04     Results from last 7 days   Lab Units 02/01/21  1923 02/01/21  1415   LACTIC ACID mmol/L 2 0 2 1*     ABG:    VBG: Micro  Results from last 7 days   Lab Units 02/01/21  1415   BLOOD CULTURE  Received in Microbiology Lab  Culture in Progress  Received in Microbiology Lab  Culture in Progress  Intake and Output  I/O       01/31 0701 - 02/01 0700 02/01 0701 - 02/02 0700 02/02 0701 - 02/03 0700    P  O   290     I V  (mL/kg)  1019 (12 8)     IV Piggyback  200     Total Intake(mL/kg)  1509 (18 9)     Net  +1509            Unmeasured Urine Occurrence  2 x           Height and Weights   Height: 5' 8" (172 7 cm)  IBW: 63 9 kg  Body mass index is 26 76 kg/m²  Weight (last 2 days)     Date/Time   Weight    02/01/21 1545   79 8 (176)                Nutrition       Diet Orders   (From admission, onward)             Start     Ordered    02/02/21 1107  Diet Surgical; Surgical Soft/Lite Meal  Diet effective now     Question Answer Comment   Diet Type Surgical    Surgical Surgical Soft/Lite Meal    RD to adjust diet per protocol?  Yes        02/02/21 1108                  Active Medications  Scheduled Meds:  Current Facility-Administered Medications   Medication Dose Route Frequency Provider Last Rate    ALPRAZolam  0 5 mg Oral HS PRN Keshav Horne, DO      ciprofloxacin  400 mg Intravenous Q24H Ramon Frost, DO      glycerin-hypromellose-  1 drop Both Eyes Q6H PRN Keshav Horne, DO      insulin lispro  1-6 Units Subcutaneous TID AC Ramon Horne, DO      insulin lispro  1-6 Units Subcutaneous HS Ramon Frost DO      levothyroxine  100 mcg Oral Early Morning Olamide Montanez, DO      magnesium sulfate  4 g Intravenous Once Dimas Oconnell, DO      metoprolol succinate  150 mg Oral BID Ramon Horne, DO      metroNIDAZOLE  500 mg Intravenous Q8H Anastasia Ren,  mg (02/02/21 2228)    multivitamin-minerals  1 tablet Oral Daily Keshav Horne, DO      potassium chloride  40 mEq Oral Once Dimas Oconnell, DO      pravastatin  80 mg Oral Daily With [employer], DO      saccharomyces boulardii 250 mg Oral BID Colin Gillespie, DO      warfarin  2 5 mg Oral Daily (warfarin) Wero Horne, DO       Continuous Infusions:     PRN Meds:   ALPRAZolam, 0 5 mg, HS PRN  glycerin-hypromellose-, 1 drop, Q6H PRN        Mary Carmenvictor manuel Reyes, DO      Portions of the record may have been created with voice recognition software  Occasional wrong word or "sound a like" substitutions may have occurred due to the inherent limitations of voice recognition software    Read the chart carefully and recognize, using context, where substitutions have occurred

## 2021-02-02 NOTE — PLAN OF CARE
Problem: Potential for Falls  Goal: Patient will remain free of falls  Description: INTERVENTIONS:  - Assess patient frequently for physical needs  -  Identify cognitive and physical deficits and behaviors that affect risk of falls  -  [place] fall precautions as indicated by assessment   - Educate patient/family on patient safety including physical limitations  - Instruct patient to call for assistance with activity based on assessment  - Modify environment to reduce risk of injury  - Consider OT/PT consult to assist with strengthening/mobility  Outcome: Progressing     Problem: Prexisting or High Potential for Compromised Skin Integrity  Goal: Skin integrity is maintained or improved  Description: INTERVENTIONS:  - Identify patients at risk for skin breakdown  - Assess and monitor skin integrity  - Assess and monitor nutrition and hydration status  - Monitor labs   - Assess for incontinence   - Turn and reposition patient  - Assist with mobility/ambulation  - Relieve pressure over bony prominences  - Avoid friction and shearing  - Provide appropriate hygiene as needed including keeping skin clean and dry  - Evaluate need for skin moisturizer/barrier cream  - Collaborate with interdisciplinary team   - Patient/family teaching  - Consider wound care consult   Outcome: Progressing     Problem: Nutrition/Hydration-ADULT  Goal: Nutrient/Hydration intake appropriate for improving, restoring or maintaining nutritional needs  Description: Monitor and assess patient's nutrition/hydration status for malnutrition  Collaborate with interdisciplinary team and initiate plan and interventions as ordered  Monitor patient's weight and dietary intake as ordered or per policy  Utilize nutrition screening tool and intervene as necessary  Determine patient's food preferences and provide high-protein, high-caloric foods as appropriate       INTERVENTIONS:  - Monitor oral intake, urinary output, labs, and treatment plans  - Assess nutrition and hydration status and recommend course of action  - Evaluate amount of meals eaten  - Assist patient with eating if necessary   - Allow adequate time for meals  - Recommend/ encourage appropriate diets, oral nutritional supplements, and vitamin/mineral supplements  - Order, calculate, and assess calorie counts as needed  - Recommend, monitor, and adjust tube feedings and TPN/PPN based on assessed needs  - Assess need for intravenous fluids  - Provide specific nutrition/hydration education as appropriate  - Include patient/family/caregiver in decisions related to nutrition  Outcome: Progressing

## 2021-02-02 NOTE — ASSESSMENT & PLAN NOTE
Lab Results   Component Value Date    HGBA1C 5 8 09/05/2019       Recent Labs     02/01/21  1720 02/01/21 2050 02/02/21  0753 02/02/21  1143   POCGLU 123 125 92 106       Blood Sugar Average: Last 72 hrs:  (P) 111 5

## 2021-02-03 PROBLEM — N39.0 UTI (URINARY TRACT INFECTION): Status: ACTIVE | Noted: 2021-01-01

## 2021-02-03 NOTE — ASSESSMENT & PLAN NOTE
Right great toe  - sensitivity of left great toe reported today, no discharge/swelling appreciated, no complaints today  - continue to monitor for infection

## 2021-02-03 NOTE — ASSESSMENT & PLAN NOTE
Urine culture positive for >100,000 E  Coli  Tenderness in lower abdomen on examination  Denies urinary symptoms  Patient currently on ciprofloxacin and metronidazole for presumed diverticulitis     · E coli sensitive to ciprofloxacin

## 2021-02-03 NOTE — ASSESSMENT & PLAN NOTE
Patient presents with new onset weakness with inability to rise from toilet with 1 month of fatigue, reduced appetite, and weight loss  Etiology unknown  In context of patient with newly diagnosed vulvar intraepithelial neoplasia, has history of tobacco use, history of diverticulosis, uncontrolled diabetes, and arrhythmia    · CT chest, abdomen, pelvis:  Right-sided pulmonary nodule 2x1 3cm and enlarged arnulfo carinal lymph, Sigmoid fat stranding and enlarged left inguinal lymph node  · Abnormal UA (follow up urine culture)  · WBC: 24 56  · CBC - increased leukocytosis and concomitant increase in hemoglobin and platelets  · Normoglycemia reduces suspicion for infection  · Continue Abx  · TSH 0 322  · Fall precautions  · Follow up blood NGTD and Urine cultures pending  · Patient expressed preference for outpatient investigation/management for malignancy  · Patient feels improved from admission, but jessy as yesterday  · Consult Oncology - help appreciated

## 2021-02-03 NOTE — ASSESSMENT & PLAN NOTE
Lab Results   Component Value Date    HGBA1C 5 8 09/05/2019       Recent Labs     02/02/21  1143 02/02/21  1711 02/02/21  2203 02/03/21  0744   POCGLU 106 108 118 127       Blood Sugar Average: Last 72 hrs:  (P) 114 [id number] today 258  Start patient on correction scale  Blood sugar has been WNL

## 2021-02-03 NOTE — PROGRESS NOTES
Progress Note - Medardo Ferrara 1935, 80 y o  female MRN: [id number]    Unit/Bed#: [address] [place] [address] Encounter: [id number]    Primary Care Provider: Carl Scruggs DO   Date and time admitted to hospital: 2/1/2021 10:59 AM        * Generalized muscle weakness  Assessment & Plan  Patient presents with new onset weakness with inability to rise from toilet with 1 month of fatigue, reduced appetite, and weight loss  Etiology unknown  In context of patient with newly diagnosed vulvar intraepithelial neoplasia, has history of tobacco use, history of diverticulosis, uncontrolled diabetes, and arrhythmia  · CT chest, abdomen, pelvis:  Right-sided pulmonary nodule 2x1 3cm and enlarged arnulfo carinal lymph, Sigmoid fat stranding and enlarged left inguinal lymph node  · Abnormal UA (follow up urine culture)  · WBC: 24 56  · CBC - increased leukocytosis and concomitant increase in hemoglobin and platelets  · Normoglycemia reduces suspicion for infection  · Continue Abx  · TSH 0 322  · Fall precautions  · Follow up blood NGTD and Urine cultures pending  · Patient expressed preference for outpatient investigation/management for malignancy  · Patient feels improved from admission, but jessy as yesterday  · Consult Oncology - help appreciated      Vulvar intraepithelial neoplasia, differentiated-type (dVIN)  Assessment & Plan  Patient recently seen in Dr Stephanie Key office on 01/11/2021 and biopsy was taken  Results revealed vulvar intraepithelial neoplasia on 01/19/2021  Biopsy results shown below    Vulvar intraepithelial neoplasia, differentiated (simplex) type (dVIN), 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  -- Confirmed by positive p53 and negative p16/Ki-67 (increased proliferative index) immunostains  -- Associated spongiosis and inflammation (mostly neutrophils and eosinophils); special stain for fungus       (PAS) negative  - No definitive invasive carcinoma identified   Note: Although no definitive invasive carcinoma is identified, the biopsy is superficial and may not be representative of the entire lesion  Patient has appointment scheduled with Dr  [facility] (gynecology Oncology) on 2/4  Patient is reluctant to further workup for pulmonary nodule this time  Would like to try to get patient to her out-patient appointment  Monitor leukocytosis and symptoms in determining disposition     Pulmonary nodule  Assessment & Plan  Potential biopsy  · IR consulted discontinued  · Patient expresses she does not wish to undergo biopsy this  · She is aware she cannot have biopsy concomitantly with vulvar mass surgery    Diverticulosis with fat stranding and leukocytosis  Assessment & Plan  CT: Diverticulosis coli  Possible segmental perisigmoid fat stranding may be due to acute diverticulitis, although evaluation is limited    · Surgical soft diet  · LR discontinued  · Continue Cipro 400mg p o  q12h and metronidazole 500mg p o  q8H  · Trend vitals, WBCs, and exam findings/symptoms  · Leukocytisis fluctuating 23 on 2/1 to 18 on 2/2 24 5 on 2/3  · Appears to change in sync with hgb and platelets  · Patient tolerated diet advancement well    Acute on chronic kidney failure [facility])  Assessment & Plan  Lab Results   Component Value Date    EGFR 39 02/03/2021    EGFR 36 02/02/2021    EGFR 27 02/01/2021    CREATININE 1 25 02/03/2021    CREATININE 1 33 (H) 02/02/2021    CREATININE 1 70 (H) 02/01/2021   · Possible acute on chronic injury  · Patient receive 1L NS in ED  · Continue to monitor renal function  · LR 120ml/hr discontinued  · Correct electrolyte abnormalities  · Hold Diovan/losartan, consider restarting tomorrow if renal function continues to improve    Type 2 diabetes mellitus with diabetic chronic kidney disease [facility])  Assessment & Plan  Lab Results   Component Value Date    HGBA1C 5 8 09/05/2019       Recent Labs     02/02/21  1143 02/02/21  1711 02/02/21  2203 02/03/21  0744   POCGLU 106 108 118 127       Blood Sugar Average: Last 72 hrs:  (P) 114 [id number] today 258  Start patient on correction scale  Blood sugar has been WNL    Chronic kidney disease, stage 2 (mild)  Assessment & Plan  Lab Results   Component Value Date    EGFR 39 02/03/2021    EGFR 36 02/02/2021    EGFR 27 02/01/2021    CREATININE 1 25 02/03/2021    CREATININE 1 33 (H) 02/02/2021    CREATININE 1 70 (H) 02/01/2021   ·     Paroxysmal atrial fibrillation ([address] )  Assessment & Plan  [id number] 5  On coumadin  Follows with [facility] cardiology (Dr Arash Singh visit 8/11/2020)  · Continue home metoprolol 150 mg p o  BID  · Telemetry  · Start patient on 2 5 mg coumadin daily, and monitor INR  · Aware patient on antibiotics which may affect P450 metabolism  · INR 2 06 on 2/2, 2 29 on 2/3 continue current regimen    Acquired hypothyroidism  Assessment & Plan  Home levothyroxine 112mcg, consider change in dose  · TSH low at 0 322, dose reduced to 100mcg daily      Macular degeneration  Assessment & Plan  Patient reports taking OTC eye drops-> given artificial tears PRN  · Preservision not available in pharmacy  · Reports receiving a medication from her retinal specialist every 2 weeks    Ingrown toenail  Assessment & Plan  Right great toe  - sensitivity of left great toe reported today, no discharge/swelling appreciated, no complaints today  - continue to monitor for infection    Anemia  Assessment & Plan  11 on 2/1 -> 9 2 on 2/2-> 10 on 2/3      History of sick sinus syndrome  Assessment & Plan  Pace maker in place      ---------------------------------------------------------------------------------------  SUBJECTIVE  Patient seen examined at bedside  Patient reports she feels about the same as yesterday, which is improved from admission  Patient reports her wrist was contorted during repositioning, denies feeling of crack or pop  Patient signed off for removal of SCDs  Review of Systems   Constitutional: Positive for chills and fatigue ( improved)   Negative for appetite change ( interest to advance the diet), diaphoresis and fever  HENT: Negative for congestion, nosebleeds and rhinorrhea  Eyes: Positive for visual disturbance  Respiratory: Positive for shortness of breath ( exertional)  Negative for chest tightness and wheezing  Apnea:  degeneration  Cardiovascular: Negative for chest pain, palpitations and leg swelling  Gastrointestinal: Negative for abdominal pain, blood in stool ( dark stool 1 month ago), constipation ( patient reports straining with bowel movements but is regular every other day), diarrhea and nausea  Endocrine: Positive for cold intolerance  Genitourinary: Positive for genital sores and vaginal pain ( positional)  Negative for difficulty urinating, dysuria, flank pain, urgency and vaginal bleeding  Vaginal mass   Musculoskeletal: Negative for back pain  Pain of left wrist   Skin: Negative for color change and pallor  Neurological: Negative for dizziness and light-headedness  Hematological: Negative for adenopathy  Does not bruise/bleed easily  Psychiatric/Behavioral: Negative for behavioral problems  ---------------------------------------------------------------------------------------  OBJECTIVE    Vitals   Vitals:    21 2300 21 0300 21 0833 21 0845   BP: 143/64 155/68 159/70 159/70   BP Location: Right arm Right arm Right arm    Pulse: 66 74 74 74   Resp: 18 18 19    Temp: 99 °F (37 2 °C) 99 4 °F (37 4 °C) 99 6 °F (37 6 °C)    TempSrc: Oral Oral Oral    SpO2: 95% 94% 97%    Weight:       Height:         Temp (24hrs), Av 1 °F (37 3 °C), Min:98 4 °F (36 9 °C), Max:99 6 °F (37 6 °C)  Current: Temperature: 99 6 °F (37 6 °C)          Physical Exam  Exam conducted with a chaperone present  Constitutional:       General: She is not in acute distress  Appearance: Normal appearance  She is normal weight  She is not ill-appearing, toxic-appearing or diaphoretic     HENT:      Head: Normocephalic and atraumatic  Mouth/Throat:      Mouth: Mucous membranes are dry  Pharynx: Oropharynx is clear  No oropharyngeal exudate or posterior oropharyngeal erythema  Comments: Scar over left lip s/p BCC excision  Eyes:      Extraocular Movements: Extraocular movements intact  Conjunctiva/sclera: Conjunctivae normal    Cardiovascular:      Rate and Rhythm: Normal rate and regular rhythm  Pulses: Normal pulses  Heart sounds: Murmur present  No friction rub  No gallop  Pulmonary:      Effort: Pulmonary effort is normal  No respiratory distress  Breath sounds: Normal breath sounds  No stridor  No wheezing, rhonchi or rales  Comments: Patient coughed during examination and there were small streaks of blood in sputum  Abdominal:      General: Abdomen is flat  Bowel sounds are normal       Palpations: Abdomen is soft  Tenderness: There is abdominal tenderness (Left upper and lower quadrants, with mild discomfort in right lower quadrant)  There is no right CVA tenderness, left CVA tenderness, guarding or rebound  Genitourinary:     Labia:         Left: Tenderness and lesion (mass) present  No injury  Comments: 3cm, tender, parasagittal left labial mass  No purulence, erythema, active bleeding or odor detected  Intertriginous region of gluteal cleft, posterior to lesion is moist and irritated   Musculoskeletal:         General: Swelling ( left wrist with active/passive ROM limited by pain, distal to antecubital peripheral line ) and tenderness (Left lateral calf) present  Right lower leg: No edema  Left lower leg: No edema  Lymphadenopathy:      Head:      Right side of head: No submandibular adenopathy  Left side of head: No submandibular adenopathy  Cervical: No cervical adenopathy  Upper Body:      Right upper body: No axillary adenopathy  Left upper body: No axillary adenopathy        Lower Body: No right inguinal adenopathy  No left inguinal adenopathy  Skin:     General: Skin is warm and dry  Coloration: Skin is not pale  Findings: No erythema or rash  Neurological:      General: No focal deficit present  Mental Status: She is alert and oriented to person, place, and time  Mental status is at baseline  Cranial Nerves: No cranial nerve deficit  Sensory: No sensory deficit  Motor: Weakness ( lower extremities 4/5 strength) present        Coordination: Coordination normal       Comments: Upper extremities 5/5 strength   Psychiatric:         Mood and Affect: Mood normal       Comments: Patient somewhat agitated/oppositional         Laboratory and Diagnostics:  Results from last 7 days   Lab Units 02/03/21  0546 02/02/21  0444 02/01/21  1124   WBC Thousand/uL 24 56* 18 28* 23 89*   HEMOGLOBIN g/dL 10 0* 9 2* 11 0*   HEMATOCRIT % 32 4* 29 7* 36 5   PLATELETS Thousands/uL 271 245 305   NEUTROS PCT % 86* 77* 84*   MONOS PCT % 8 10 5     Results from last 7 days   Lab Units 02/03/21  0546 02/02/21 0444 02/01/21  1124   SODIUM mmol/L 134* 135* 135*   POTASSIUM mmol/L 4 2 3 6 3 7   CHLORIDE mmol/L 103 105 101   CO2 mmol/L 24 25 26   ANION GAP mmol/L 7 5 8   BUN mg/dL 17 22 20   CREATININE mg/dL 1 25 1 33* 1 70*   CALCIUM mg/dL 9 4 9 3 10 3*   GLUCOSE RANDOM mg/dL 122 104 258*   ALT U/L 14 16 20   AST U/L 14 16 25   ALK PHOS U/L 94 83 102   ALBUMIN g/dL 2 0* 2 0* 2 4*   TOTAL BILIRUBIN mg/dL 0 50 0 50 0 60     Results from last 7 days   Lab Units 02/03/21  0546 02/02/21 0444 02/01/21  1124   MAGNESIUM mg/dL 2 1 1 7 1 9   PHOSPHORUS mg/dL 2 9 3 0  --       Results from last 7 days   Lab Units 02/03/21  0546 02/02/21 0445 02/01/21  1130   INR  2 29* 2 06* 1 73*   PTT seconds  --   --  33      Results from last 7 days   Lab Units 02/01/21  1124   TROPONIN I ng/mL 0 04     Results from last 7 days   Lab Units 02/01/21  1923 02/01/21  1415   LACTIC ACID mmol/L 2 0 2 1*     ABG:    VBG: Micro  Results from last 7 days   Lab Units 02/01/21  1441 02/01/21  1415   BLOOD CULTURE   --  No Growth at 24 hrs  No Growth at 24 hrs  URINE CULTURE  >100,000 cfu/ml Escherichia coli*  --          Intake and Output  I/O       02/01 0701 - 02/02 0700 02/02 0701 - 02/03 0700 02/03 0701 - 02/04 0700    P  O  290 700     I V  (mL/kg) 1019 (12 8) 5 (0 1)     IV Piggyback 200 100     Total Intake(mL/kg) 1509 (18 9) 805 (10 1)     Urine (mL/kg/hr)  350 (0 2)     Total Output  350     Net +1509 +455            Unmeasured Urine Occurrence 2 x 1 x           Height and Weights   Height: 5' 8" (172 7 cm)  IBW: 63 9 kg  Body mass index is 26 76 kg/m²  Weight (last 2 days)     Date/Time   Weight    02/01/21 1545   79 8 (176)                Nutrition       Diet Orders   (From admission, onward)             Start     Ordered    02/02/21 1107  Diet Surgical; Surgical Soft/Lite Meal  Diet effective now     Question Answer Comment   Diet Type Surgical    Surgical Surgical Soft/Lite Meal    RD to adjust diet per protocol?  Yes        02/02/21 1108                  Active Medications  Scheduled Meds:  Current Facility-Administered Medications   Medication Dose Route Frequency Provider Last Rate    ALPRAZolam  0 5 mg Oral HS PRN Sagrario Horne DO      ciprofloxacin  500 mg Oral Q24H Ramon Frost DO      glycerin-hypromellose-  1 drop Both Eyes Q6H PRN Sagrario Horne DO      insulin lispro  1-6 Units Subcutaneous TID AC Ramon Horne DO      insulin lispro  1-6 Units Subcutaneous HS Ramon Frost DO      levothyroxine  100 mcg Oral Early Morning Olamide oMntanez, DO      metoprolol succinate  150 mg Oral BID Ramon Horne DO      metroNIDAZOLE  500 mg Oral [facility] Sagrario Frost DO      multivitamin-minerals  1 tablet Oral Daily Ramon Frost DO      pravastatin  80 mg Oral Daily With [employer], DO      saccharomyces boulardii  250 mg Oral BID Makeda Brandt, DO      warfarin  2 5 mg Oral Daily (warfarin) Ed Horne, DO            PRN Meds:   ALPRAZolam, 0 5 mg, HS PRN  glycerin-hypromellose-, 1 drop, Q6H PRN        Dory Brizuela DO      Portions of the record may have been created with voice recognition software  Occasional wrong word or "sound a like" substitutions may have occurred due to the inherent limitations of voice recognition software    Read the chart carefully and recognize, using context, where substitutions have occurred

## 2021-02-03 NOTE — NURSING NOTE
Spoke with patient's daughter Milvia Machado who is asking for the doctor to call her to discuss patient's care  Resident physicians made aware that the daughter would like to be called  Patient's daughter also asking about PT for the patient  Resident physicians asked to place PT/OT order  Patient's daughter made aware of the patient's apprehension for medical treatment and reluctance to mobilize  Patient's daughter made aware that patient has been uncooperative at times

## 2021-02-03 NOTE — ASSESSMENT & PLAN NOTE
Lab Results   Component Value Date    EGFR 39 02/03/2021    EGFR 36 02/02/2021    EGFR 27 02/01/2021    CREATININE 1 25 02/03/2021    CREATININE 1 33 (H) 02/02/2021    CREATININE 1 70 (H) 02/01/2021   ·

## 2021-02-03 NOTE — ASSESSMENT & PLAN NOTE
[id number] 5  On coumadin  Follows with [facility] cardiology (Dr Beckett Batch visit 8/11/2020)  · Continue home metoprolol 150 mg p o   BID  · Telemetry  · Start patient on 2 5 mg coumadin daily, and monitor INR  · Aware patient on antibiotics which may affect P450 metabolism  · INR 2 06 on 2/2, 2 29 on 2/3 continue current regimen

## 2021-02-03 NOTE — ASSESSMENT & PLAN NOTE
CT: Diverticulosis coli  Possible segmental perisigmoid fat stranding may be due to acute diverticulitis, although evaluation is limited    · Surgical soft diet  · LR discontinued  · Continue Cipro 400mg p o  q12h and metronidazole 500mg p o  q8H  · Trend vitals, WBCs, and exam findings/symptoms  · Leukocytisis fluctuating 23 on 2/1 to 18 on 2/2 24 5 on 2/3  · Appears to change in sync with hgb and platelets  · Patient tolerated diet advancement well

## 2021-02-03 NOTE — ASSESSMENT & PLAN NOTE
Lab Results   Component Value Date    EGFR 39 02/03/2021    EGFR 36 02/02/2021    EGFR 27 02/01/2021    CREATININE 1 25 02/03/2021    CREATININE 1 33 (H) 02/02/2021    CREATININE 1 70 (H) 02/01/2021   · Possible acute on chronic injury  · Patient receive 1L NS in ED  · Continue to monitor renal function  · LR 120ml/hr discontinued  · Correct electrolyte abnormalities  · Hold Diovan/losartan, consider restarting tomorrow if renal function continues to improve

## 2021-02-04 PROBLEM — M25.532 LEFT WRIST PAIN: Status: ACTIVE | Noted: 2021-01-01

## 2021-02-04 NOTE — QUICK NOTE
RN notified me that patient's daughter, Harini Sainz, which speak with resident regarding updates on the patient  Zacarias Robert is asking if patient has received PT and OT services-I inform her that evaluation by PT and OT was requested in the afternoon today and they will likely see her tomorrow  She then asked what the process would be to change the patient's power  to include both daughters, Zacarias Robert and Nguyễn Warren- she informs me that their brother, Zoltan Soriano, has short-term memory issues and thus needs to be removed from power of   I informed her that I will have case management get in touch with her in regards to a power of   She inquired if gynecology oncologist Dr hSerri Lowe will be seeing the patient in the hospital-patient has a scheduled outpatient appointment with Dr Sherri Lowe on 2/4 which she is unlikely to make it to since she is hospitalized currently  I informed Ms Sosa that Dr Sherri Lowe does not round at this facility but is aware that patient is here-we have consulted Dr Hyacinth Leone, oncologist, who will see the patient tomorrow and give further recommendations in regards to vulvar neoplasm  She inquired if patient would require surgical intervention for vulvar neoplasm- I informed her that at this time none of the oncologists have made that decision so Dr Hyacinth Leone will evaluate her tomorrow and give any further recommendations  Ms Rosaura Mayers also inquired regarding patient's hypercalcemia-I informed her that yes the patient is mildly hypercalcemic-this could be due to neoplasm but is not clinically concerning to require intervention to correct  She then informed me that patient takes Preservision supplements for her vision which has not been receiving in the hospital-I informed her that the pharmacy does not carry this particular formulation of multivitamins but if someone can bring the vitamins to the hospital we consented to the pharmacy and start giving it to the patient-Ms Rosaura Mayers informs me that she lives in [place] and her sister Madhuri Vasquez, lives in [place] but she will have her brother, Rosalinda Rodarte, bring the vitamins the hospital- I reiterated that the patient will not be able to keep the vitamins with her in the room but that it will go to the pharmacy and the nurse will dispense it to the patient  Meds:  Requested to speak with Dr Johnathon Pina, senior attending- I informed Ms Sosa that Dr Johnathon Pina rounds during the day and that I will notify the day team that she would like to speak with Dr Johnathon Pina  I asked to clarify who which should call with updates regarding the patient's-Ms Sosa herself, her sister Madhuri Vasquez or their brother Pedrito Tenorio notifies me that we should contact her 1st and if she is not available we should try her sister Madhuri Vasquez  Their phone numbers are as listed below  Papo Gael, older daughter, [place]- [phone] (cell)  Lexi Ortiz, younger daughter, [place], [place]- [phone] (cell)    To be cosigned by Dr Johnathon Pina

## 2021-02-04 NOTE — ASSESSMENT & PLAN NOTE
Experiencing 2 day history of left wrist and associated swelling, patient unable to fully describe trauma (may have been caused during assisted transfer)  · Patient has left antecubital peripheral line proximal to edema  · Tenderness appears to be in soft tissues  · X-ray ordered to rule out acute osseous abnormality  · Patient refusing ice  · Tylenol 650 PO PRN

## 2021-02-04 NOTE — ASSESSMENT & PLAN NOTE
Patient presents with new onset weakness with inability to rise from toilet with 1 month of fatigue, reduced appetite, and weight loss  Etiology unknown  In context of patient with newly diagnosed vulvar intraepithelial neoplasia, has history of tobacco use, history of diverticulosis, uncontrolled diabetes, and arrhythmia    · CT chest, abdomen, pelvis:  Right-sided pulmonary nodule 2x1 3cm and enlarged arnulfo carinal lymph, Sigmoid fat stranding and enlarged left inguinal lymph node  · Abnormal UA (follow up urine culture)  · WBC: 24 56-> 25 5,-> 25 57 (persistent leukocytosis)  · Normoglycemia reduces suspicion for infection  · Discontinue Abx as per ID (help appreciated)  · Infectious disease  · CRP, ESR both elevated  · Fall precautions  · Follow up blood NGTD and Urine cultures positive for E coli (sensitive to ciprofloxacin which patient was on for 4 days) and patient had o symptoms  · Consult Oncology - help appreciated  · Flow cytometry, monitor CBC for WBCs and anemia  · Gyn-Onc  · See recommendations in Vulvar intraepithelial Neoplasia

## 2021-02-04 NOTE — ASSESSMENT & PLAN NOTE
Patient recently seen in Dr Mary Sheldon office on 01/11/2021 and biopsy was taken  Results revealed vulvar intraepithelial neoplasia on 01/19/2021  Biopsy results shown below    Vulvar intraepithelial neoplasia, differentiated (simplex) type (dVIN), 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  -- Confirmed by positive p53 and negative p16/Ki-67 (increased proliferative index) immunostains  -- Associated spongiosis and inflammation (mostly neutrophils and eosinophils); special stain for fungus       (PAS) negative  - No definitive invasive carcinoma identified  Note: Although no definitive invasive carcinoma is identified, the biopsy is superficial and may not be representative of the entire lesion  Patient has (had) appointment scheduled with Dr Russell (gynecology Oncology) on 2/4  Patient is reluctant to further workup for pulmonary nodule this time    Monitor leukocytosis and symptoms in determining disposition

## 2021-02-04 NOTE — ASSESSMENT & PLAN NOTE
Experiencing 2 day history of left wrist and associated swelling, patient unable to fully describe trauma (may have been caused during assisted transfer)  · Patient has left antecubital peripheral line proximal to edema -> asked nurse to switched to other arm  · Tenderness appears to be in soft tissues  · X-ray read pending  · Patient refusing ice  · Tylenol 650 PO PRN

## 2021-02-04 NOTE — PROGRESS NOTES
Progress Note - Bienvenido Layne 1935, 80 y o  female MRN: [id number]    Unit/Bed#: [address] [place] [address] Encounter: [id number]    Primary Care Provider: Yasmine Sunshine DO   Date and time admitted to hospital: 2/1/2021 10:59 AM      * Generalized muscle weakness and Persistent leukocytosis  Assessment & Plan  Patient presents with new onset weakness with inability to rise from toilet with 1 month of fatigue, reduced appetite, and weight loss  Etiology unknown  In context of patient with newly diagnosed vulvar intraepithelial neoplasia, has history of tobacco use, history of diverticulosis, uncontrolled diabetes, and arrhythmia  · CT chest, abdomen, pelvis:  Right-sided pulmonary nodule 2x1 3cm and enlarged arnulfo carinal lymph, Sigmoid fat stranding and enlarged left inguinal lymph node  · Abnormal UA (follow up urine culture)  · WBC: 24 56-> 25 5 (persistent leukocytosis)  · CBC - increased leukocytosis and concomitant increase in hemoglobin and platelets  · Normoglycemia reduces suspicion for infection  · Continue Abx  · Infectious disease consulted  · TSH 0 322  · Fall precautions  · Follow up blood NGTD and Urine cultures positive for E coli (sensitive to ciprofloxacin)  · Patient expressed preference for outpatient investigation/management for malignancy  · Patient feels improved from admission, but jessy as yesterday  · Consult Oncology - help appreciated      Vulvar intraepithelial neoplasia, differentiated-type (dVIN)  Assessment & Plan  Patient recently seen in Dr Mose Felty office on 01/11/2021 and biopsy was taken  Results revealed vulvar intraepithelial neoplasia on 01/19/2021  Biopsy results shown below    Vulvar intraepithelial neoplasia, differentiated (simplex) type (dVIN), 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  -- Confirmed by positive p53 and negative p16/Ki-67 (increased proliferative index) immunostains      -- Associated spongiosis and inflammation (mostly neutrophils and eosinophils); special stain for fungus       (PAS) negative  - No definitive invasive carcinoma identified  Note: Although no definitive invasive carcinoma is identified, the biopsy is superficial and may not be representative of the entire lesion  Patient has (had) appointment scheduled with Dr Anabella Key (gynecology Oncology) on 2/4  Patient is reluctant to further workup for pulmonary nodule this time  Monitor leukocytosis and symptoms in determining disposition     Pulmonary nodule  Assessment & Plan  Potential biopsy  · IR consulted discontinued  · Patient expresses she does not wish to undergo biopsy this  · She is aware she cannot have biopsy concomitantly with vulvar mass surgery  · Oncology on board    Diverticulosis with fat stranding and leukocytosis  Assessment & Plan  CT: Diverticulosis coli  Possible segmental perisigmoid fat stranding may be due to acute diverticulitis, although evaluation is limited    · Surgical soft diet  · LR discontinued  · Continue Cipro 400mg p o  q12h and metronidazole 500mg p o  q8H  · Trend vitals, WBCs, and exam findings/symptoms  · Leukocytisis fluctuating 23 on 2/1 to 18 on 2/2 24 5 on 2/3, 25 5 on 2/4  · Appears to change in sync with hgb and platelets  · Patient tolerated diet advancement well    Acute on chronic kidney failure [facility])  Assessment & Plan  Lab Results   Component Value Date    EGFR 35 02/04/2021    EGFR 39 02/03/2021    EGFR 36 02/02/2021    CREATININE 1 39 (H) 02/04/2021    CREATININE 1 25 02/03/2021    CREATININE 1 33 (H) 02/02/2021   · Acute on chronic injury  · Patient receive 1L NS in ED  · Continue to monitor renal function  · LR 120ml/hr discontinued  · Correct electrolyte abnormalities  · Hold Diovan/losartan, consider restarting tomorrow if renal function continues to improve    Type 2 diabetes mellitus with diabetic chronic kidney disease [facility])  Assessment & Plan  Lab Results   Component Value Date    HGBA1C 5 8 09/05/2019       Recent Labs     02/03/21  1110 02/03/21  1621 02/03/21 2128 02/04/21  0754   POCGLU 135 161* 125 103       Blood Sugar Average: Last 72 hrs:  (P) 120 [id number] today 258  Start patient on correction scale  Blood sugar has been within tolerable range    Paroxysmal atrial fibrillation [facility])  Assessment & Plan  [id number]  On coumadin  Follows with [facility] cardiology (Dr Lesa Handley visit 8/11/2020)  · Continue home metoprolol 150 mg p o  BID  · Telemetry  · Start patient on 2 5 mg coumadin daily, and monitor INR  · Aware patient on antibiotics which may affect P450 metabolism  · INR 2 06 on 2/2, 2 29 on 2/3, 2 47 on 2/4 continue current regimen    Acquired hypothyroidism  Assessment & Plan  Home levothyroxine 112mcg, consider change in dose  · TSH low at 0 322, dose reduced to 100mcg daily      Macular degeneration  Assessment & Plan  Patient reports taking OTC eye drops-> given artificial tears PRN  · Preservision not available in pharmacy  · Reports receiving a medication from her retinal specialist every 2 weeks    Ingrown toenail  Assessment & Plan  Right great toe  - sensitivity of left great toe reported today, no discharge/swelling appreciated, no complaints today  - continue to monitor for infection    Left wrist pain  Assessment & Plan  Experiencing 2 day history of left wrist and associated swelling, patient unable to fully describe trauma (may have been caused during assisted transfer)  · Patient has left antecubital peripheral line proximal to edema  · Tenderness appears to be in soft tissues  · X-ray ordered to rule out acute osseous abnormality  · Patient refusing ice  · Tylenol 650 PO PRN    UTI (urinary tract infection)  Assessment & Plan  Urine culture positive for >100,000 E  Coli  Tenderness in lower abdomen on examination  Denies urinary symptoms  Patient currently on ciprofloxacin and metronidazole for presumed diverticulitis     · E coli sensitive to ciprofloxacin    Anemia  Assessment & Plan  11 on 2/1 -> 9 2 on 2/2-> 10 on 2/3-> 10 0 2/4      History of sick sinus syndrome  Assessment & Plan  Pace maker in place    ---------------------------------------------------------------------------------------  SUBJECTIVE  The patient seen and examined at bedside  She reports no acute events overnight  Denies abdominal discomfort, dysuria, urinary frequency  Her left wrist pain and swelling have persisted  She still does not want ice  Patient reports apprehension about further testing  She expresses she would like both of her daughters to have access to her medical information and would like to designate decision-making power to them if she is unable to make decisions herself  Case management aware  Review of Systems   Constitutional: Positive for chills and fatigue ( improved)  Negative for appetite change ( interest to advance the diet), diaphoresis and fever  HENT: Negative for congestion  Eyes: Positive for visual disturbance  Respiratory: Negative for chest tightness, shortness of breath and wheezing  Apnea:  degeneration  Cardiovascular: Negative for chest pain, palpitations and leg swelling  Gastrointestinal: Negative for abdominal pain, blood in stool ( dark stool 1 month ago), constipation ( patient reports straining with bowel movements but is regular ), diarrhea and nausea  Endocrine: Positive for cold intolerance  Genitourinary: Positive for genital sores and vaginal pain ( positional)  Negative for difficulty urinating, dysuria, flank pain, urgency and vaginal bleeding  Vaginal mass   Musculoskeletal: Positive for joint swelling (Left wrist) and myalgias ( left wrist)  Negative for back pain  Pain of left wrist   Skin: Negative for color change and pallor  Neurological: Negative for headaches  Hematological: Negative for adenopathy  Does not bruise/bleed easily  Psychiatric/Behavioral: Negative for behavioral problems  ---------------------------------------------------------------------------------------  OBJECTIVE    Vitals   Vitals:    21 0833 21 0845 21 2113 21 0300   BP: 159/70 159/70 126/58 137/63   BP Location: Right arm  Right arm    Pulse: 74 74 67 73   Resp: 19  16 17   Temp: 99 6 °F (37 6 °C)  99 7 °F (37 6 °C) 99 °F (37 2 °C)   TempSrc: Oral  Oral Oral   SpO2: 97%  97% 97%   Weight:       Height:         Temp (24hrs), Av 4 °F (37 4 °C), Min:99 °F (37 2 °C), Max:99 7 °F (37 6 °C)  Current: Temperature: 99 °F (37 2 °C)            Physical Exam  Exam conducted with a chaperone present  Constitutional:       General: She is not in acute distress  Appearance: Normal appearance  She is normal weight  She is not ill-appearing, toxic-appearing or diaphoretic  HENT:      Head: Normocephalic and atraumatic  Mouth/Throat:      Mouth: Mucous membranes are dry  Pharynx: Oropharynx is clear  No oropharyngeal exudate or posterior oropharyngeal erythema  Comments: Scar over left lip s/p BCC excision  Eyes:      Extraocular Movements: Extraocular movements intact  Conjunctiva/sclera: Conjunctivae normal    Cardiovascular:      Rate and Rhythm: Normal rate and regular rhythm  Pulses: Normal pulses  Heart sounds: Murmur present  No friction rub  No gallop  Pulmonary:      Effort: Pulmonary effort is normal  No respiratory distress  Breath sounds: Normal breath sounds  No stridor  No wheezing, rhonchi or rales  Comments: Patient coughed during examination and there were small streaks of blood in sputum  Abdominal:      General: Abdomen is flat  Bowel sounds are normal       Palpations: Abdomen is soft  Tenderness: There is abdominal tenderness (Left upper and lower quadrants, with mild discomfort in right lower quadrant)  There is no right CVA tenderness, left CVA tenderness, guarding or rebound     Genitourinary:     Labia:         Left: Tenderness and lesion (mass) present  No injury  Comments: 3cm, tender, parasagittal left labial mass  No purulence, erythema, active bleeding or odor detected  Intertriginous region of gluteal cleft, posterior to lesion is moist and irritated   Musculoskeletal:         General: Swelling ( left wrist with active/passive ROM limited by pain, distal to antecubital peripheral line ) and tenderness (Left wrist appears to be localized to flexor tendons) present  Right lower leg: No edema  Left lower leg: No edema  Lymphadenopathy:      Head:      Right side of head: No submandibular adenopathy  Left side of head: No submandibular adenopathy  Cervical: No cervical adenopathy  Upper Body:      Right upper body: No axillary adenopathy  Left upper body: No axillary adenopathy  Lower Body: No right inguinal adenopathy  No left inguinal adenopathy  Skin:     General: Skin is warm and dry  Coloration: Skin is not pale  Findings: No erythema or rash  Neurological:      General: No focal deficit present  Mental Status: She is alert and oriented to person, place, and time  Mental status is at baseline  Cranial Nerves: No cranial nerve deficit  Sensory: No sensory deficit  Motor: Weakness ( lower extremities 4/5 strength) present        Coordination: Coordination normal       Comments: Upper extremities 5/5 strength   Psychiatric:         Mood and Affect: Mood normal       Comments: Intermittently agitated/oppositional         Laboratory and Diagnostics:  Results from last 7 days   Lab Units 02/04/21  0509 02/03/21  0546 02/02/21  0444 02/01/21  1124   WBC Thousand/uL 25 50* 24 56* 18 28* 23 89*   HEMOGLOBIN g/dL 9 7* 10 0* 9 2* 11 0*   HEMATOCRIT % 31 2* 32 4* 29 7* 36 5   PLATELETS Thousands/uL 276 271 245 305   NEUTROS PCT % 84* 86* 77* 84*   MONOS PCT % 10 8 10 5     Results from last 7 days   Lab Units 02/04/21  0509 02/03/21  0546 02/02/21  0444 02/01/21  1124   SODIUM mmol/L 134* 134* 135* 135*   POTASSIUM mmol/L 4 0 4 2 3 6 3 7   CHLORIDE mmol/L 102 103 105 101   CO2 mmol/L 25 24 25 26   ANION GAP mmol/L 7 7 5 8   BUN mg/dL 21 17 22 20   CREATININE mg/dL 1 39* 1 25 1 33* 1 70*   CALCIUM mg/dL 9 6 9 4 9 3 10 3*   GLUCOSE RANDOM mg/dL 113 122 104 258*   ALT U/L 14 14 16 20   AST U/L 19 14 16 25   ALK PHOS U/L 92 94 83 102   ALBUMIN g/dL 1 8* 2 0* 2 0* 2 4*   TOTAL BILIRUBIN mg/dL 0 50 0 50 0 50 0 60     Results from last 7 days   Lab Units 02/04/21  0509 02/03/21  0546 02/02/21  0444 02/01/21  1124   MAGNESIUM mg/dL 1 8 2 1 1 7 1 9   PHOSPHORUS mg/dL 3 0 2 9 3 0  --       Results from last 7 days   Lab Units 02/04/21  0509 02/03/21  0546 02/02/21  0445 02/01/21  1130   INR  2 47* 2 29* 2 06* 1 73*   PTT seconds  --   --   --  33      Results from last 7 days   Lab Units 02/01/21  1124   TROPONIN I ng/mL 0 04     Results from last 7 days   Lab Units 02/01/21  1923 02/01/21  1415   LACTIC ACID mmol/L 2 0 2 1*       Micro  Results from last 7 days   Lab Units 02/01/21  1441 02/01/21  1415   BLOOD CULTURE   --  No Growth at 48 hrs  No Growth at 48 hrs  URINE CULTURE  >100,000 cfu/ml Escherichia coli*  >100,000 cfu/ml   --          Imaging: I have personally reviewed pertinent reports  Intake and Output  I/O       02/02 0701 - 02/03 0700 02/03 0701 - 02/04 0700 02/04 0701 - 02/05 0700    P  O  700 360     I V  (mL/kg) 5 (0 1)      IV Piggyback 100      Total Intake(mL/kg) 805 (10 1) 360 (4 5)     Urine (mL/kg/hr) 350 (0 2)      Total Output 350      Net +455 +360            Unmeasured Urine Occurrence 1 x 4 x           Height and Weights   Height: 5' 8" (172 7 cm)  IBW: 63 9 kg  Body mass index is 26 76 kg/m²    Weight (last 2 days)     None            Nutrition       Diet Orders   (From admission, onward)             Start     Ordered    02/02/21 1107  Diet Surgical; Surgical Soft/Lite Meal  Diet effective now     Question Answer Comment   Diet Type Surgical    Surgical Surgical Soft/Lite Meal    RD to adjust diet per protocol? Yes        02/02/21 1108                  Active Medications  Scheduled Meds:  Current Facility-Administered Medications   Medication Dose Route Frequency Provider Last Rate    ALPRAZolam  0 5 mg Oral HS PRN Mallika Horne, DO      ciprofloxacin  500 mg Oral Q24H Ramon Frost, DO      glycerin-hypromellose-  1 drop Both Eyes Q6H PRN Mallika Horne, DO      insulin lispro  1-6 Units Subcutaneous TID AC Ramon Horne, DO      insulin lispro  1-6 Units Subcutaneous HS Ramon Frost, DO      levothyroxine  100 mcg Oral Early Morning Olamide Montanez, DO      metoprolol succinate  150 mg Oral BID Ramon Horne, DO      metroNIDAZOLE  500 mg Oral [facility] Oskar Ramsey, DO      multivitamin-minerals  1 tablet Oral Daily Mallika Frost, DO      pravastatin  80 mg Oral Daily With [employer], DO      saccharomyces boulardii  250 mg Oral BID Fernanda Griffin, DO      warfarin  2 5 mg Oral Daily (warfarin) Mallika Horne, DO       Continuous Infusions:     PRN Meds:   ALPRAZolam, 0 5 mg, HS PRN  glycerin-hypromellose-, 1 drop, Q6H PRN        Invasive Devices Review  Invasive Devices     Peripheral Intravenous Line            Peripheral IV 02/01/21 Left Antecubital 3 days                    Clovis Hernandes DO      Portions of the record may have been created with voice recognition software  Occasional wrong word or "sound a like" substitutions may have occurred due to the inherent limitations of voice recognition software    Read the chart carefully and recognize, using context, where substitutions have occurred

## 2021-02-04 NOTE — PHYSICAL THERAPY NOTE
PT EVALUATION       02/04/21 0955   Note Type   Note type Evaluation   Pain Assessment   Pain Assessment Tool FLACC   Pain Location/Orientation   (heels, L wrist, R knee)   Pain Rating: FLACC (Rest) - Face 0   Pain Rating: FLACC (Rest) - Legs 0   Pain Rating: FLACC (Rest) - Activity 0   Pain Rating: FLACC (Rest) - Cry 0   Pain Rating: FLACC (Rest) - Consolability 0   Score: FLACC (Rest) 0   Pain Rating: FLACC (Activity) - Face 1   Pain Rating: FLACC (Activity) - Legs 1   Pain Rating: FLACC (Activity) - Activity 1   Pain Rating: FLACC (Activity) - Cry 1   Pain Rating: FLACC (Activity) - Consolability 1   Score: FLACC (Activity) 5   Home Living   Type of Home House   Home Layout Two level; Able to live on main level with bedroom/bathroom   Home Equipment Cane;Walker   Prior Function   Level of [place]   (ambulatory with cane, progressive weakness past month)   Lives With Studentbox Help From Family   Restrictions/Precautions   Other Precautions Fall Risk;Pain; Chair Alarm; Bed Alarm;Cognitive;Combative;Agitated   General   Additional Pertinent History Pt admitted with generalized weakness and persistent leukocytosis  Pt also has a vulvar lesion, medical treatment pending  X-ray also pending for L wrist pain  Cognition   Arousal/Participation Uncooperative; Unable to reason with patient  Orientation Level Oriented X4   Memory Decreased short term memory  (pt repeatedly changing her mind, twisting therapist's words, complaining that she hasn't been OOB in 4 days but refusing to try now  Pt initially would not agree to try to sit up then did agree when the therapist rearranged pt's chair to the other side of the bed per pt request, pt then  again changed her mind and refused to try to work with PT    Pt left with CNA after clean pad place under patient and pt repositioned in bed sitting upright with a wedge under her heels for offloading as pt c/o heel soreness  )   RLE Assessment   RLE Assessment   (ROM WFL, Pt refused MMT however during evaluation pt demonst)   LLE Assessment   LLE Assessment   (ROM WFL, Pt refused MMT however during evaluation pt demonst)   Bed Mobility   Rolling R 2  Maximal assistance   Rolling L 2  Maximal assistance   Supine to Sit 2  Maximal assistance   Sit to Supine 2  Maximal assistance   Additional Comments Pt became combative when CNA and this PT tried to roll pt in bed for cleanup due to urine incontinence  Transfers   Sit to Stand   (pt refused all additional activity)   Balance   Static Sitting Fair +   Dynamic Sitting Fair -   Activity Tolerance   Activity Tolerance   (Activity tolerance limtied by pain and pt cooperation)   Nurse Made Aware Pt refusing OOB,    Assessment   Prognosis Good   Problem List Decreased strength;Decreased range of motion;Decreased endurance; Impaired balance;Decreased mobility;Pain; Impaired judgement   Assessment Patient seen for Physical Therapy evaluation  Patient admitted with Generalized muscle weakness  Comorbidities affecting patient's physical performance include: afib, DM, macular degeneration, wrist pain  Personal factors affecting patient at time of initial evaluation include: lives in 2 story house, ambulating with assistive device, inability to ambulate household distances, decreased initiation and engagement, inability to perform physical activity and inability to perform ADLS  Prior to admission, patient was independent with functional mobility with cane  Please find objective findings from Physical Therapy assessment regarding body systems outlined above with impairments and limitations including weakness, decreased ROM, impaired balance, pain, decreased activity tolerance, decreased functional mobility tolerance, impaired judgement and fall risk  The Barthel Index was used as a functional outcome tool presenting with a score of 15 today indicating marked limitations of functional mobility and ADLS    Patient's clinical presentation is currently unstable/unpredictable as seen in patient's presentation of increased fall risk, new onset of impairment of functional mobility, decreased endurance and new onset of weakness  Pt would benefit from continued Physical Therapy treatment to address deficits as defined above and maximize level of functional mobility  As demonstrated by objective findings, the assigned level of complexity for this evaluation is high  The patient's -[facility] Basic Mobility Inpatient Short Form Raw Score is 6, Standardized Score is 22 01  A standardized score less than 42 9 suggests the patient may benefit from discharge to post-acute rehabilitation services  Please also refer to the recommendation of the Physical Therapist for safe discharge planning  Goals   Patient Goals pt did not state  STG Expiration Date 02/11/21   Short Term Goal #1 improve bed mobility to mod assist, improve transfers to max assist, pt will sit OOB in chair x 2 hours   Short Term Goal #2 Pt will participate in 15 minutes of bedside activity to begin improving strength and mobility   LTG Expiration Date 02/18/21   Long Term Goal #1 pt will transfer OOB to the chair with minimal assist, improve bed mobility to  minimal assist, pt will ambulate with a walker 25 feet indoor level surfaces to pt can negotiate her home   Plan   Treatment/Interventions ADL retraining;Functional transfer training;LE strengthening/ROM; Elevations; Therapeutic exercise; Endurance training;Patient/family training;Equipment eval/education; Bed mobility;Gait training   PT Frequency   (5-7x/w)   Recommendation   PT Discharge Recommendation Post-Acute Rehabilitation Services   -[facility] Basic Mobility Inpatient   Turning in Bed Without Bedrails 1   Lying on Back to Sitting on Edge of Flat Bed 1   Moving Bed to Chair 1   Standing Up From Chair 1   Walk in Room 1   Climb 3-5 Stairs 1   Basic Mobility Inpatient Raw Score 6   Turning Head Towards Sound 4   Follow Simple Instructions 4   Low Function Basic Mobility Raw Score 14   Low Function Basic Mobility Standardized Score 22 01   Barthel Index   Feeding 5   Bathing 0   Grooming Score 0   Dressing Score 0   Bladder Score 0   Bowels Score 5   Toilet Use Score 0   Transfers (Bed/Chair) Score 5   Mobility (Level Surface) Score 0   Stairs Score 0   Barthel Index Score 13   Licensure   [place] License Number  Merlin Hatter PT [id number]     Spoke with pt's daughter Simba Dougherty while trying to evaluate patient as pt demanded  Attempted to explain the role of PT and progression of activity to Simba Dougherty reports she is not happy with her mother's care  Offered Jarred a call back from the nursing supervisor but pt not happy with that solution, she said she would call the hospital herself and find out who to complain to   Whitley RN aware of this conversation and she would pass this on to her nursing supervisor Renzo Arreaga then asked this PT to speak with pt's other daughter Chuck Gaytanar  Explained to Chuck Calixto that pt is generally uncooperative with PT  Explained that PT or OT would try to see pt each day progressing activity as the pt tolerates  We would ask pt if she wanted RN to give Tylenol prior to PT as pt is c/o multiple sites of pain  Finally, this PT spoke with Andrew Abbott regarding conversations with both daughters, Siobhan Balbuena to follow up with a phone call to pt's family

## 2021-02-04 NOTE — CASE MANAGEMENT
LOS - 3 days    SW met with pt to assess needs and discuss plans  Also spoke with daughter, Reynaldo Kyle, over phone  Discussed goals of making sure pt's needs are met upon discharge and that Freedom of Choice is offered  Pt lives in her home and her son lives with her  Per pt she was independent with ADLs and mobility PTA  Pt has cane, walker, commode and shower chair at home  Pt has had home care services in the past could not recall agency name  No STR history  No MH or D&A issues  Pt's PCP is Dr Neo Oliveira MD   Per pt she has prescription coverage and has no difficulty getting her medication as prescribed  Preferred pharmacy is [facility]    Pt does have POA/advanced directives  Pt completed POA with SW today  Copy placed on chart  Original and two copies placed in an envelope and given to pt  Pt has chosen to make her two daughters, Reynaldo Kyle and Keyonna Friend, her healthcare POAs  Discussed discharge plans and needs with pt  Offered options of home care services and STR placement  At this time pt's plan is to return home with home care services  Reviewed area home care agencies to choose from  Pt requested referral be made to [facility] [address]  Referral has been made  SW will continue to follow to monitor progress and assist with planning as needed

## 2021-02-04 NOTE — CONSULTS
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 with inguinal, pre carinal lymphadenopathy, right upper lobe lung lesion measuring 2 x 1 3 cm  She has leukocytosis with a white blood cell count of 25 5  The vulvar mass is causing significant discomfort  I discussed treatment options for vulvar cancer including chemotherapy for distant disease, radiation therapy for local disease, surgical resection  Based on prior experience in her family with chemotherapy/radiation therapy, she is not interested in pursuing these options  She is interested in potentially palliating the vulvar lesion with surgery  2  I discussed that based on her exam bedside, is not possible to determine whether the mass is surgically resectable without performing extensive reconstructive procedures  She understands that a radical vulvectomy with reconstruction would not necessarily be indicated considering she would not be treating distant disease  If radical vulvectomy is not feasible, the only option to treat local disease on the vulva is radiation therapy which she has declined  3  I discussed the risks and benefits of exam under anesthesia, possible radical vulvectomy and all other indicated procedures  She understands the risks and benefits of the surgery including potential wound breakdown and worsening performance status and agrees to proceed as outlined    She believes that her performance status will improve if the vulvar mass is removed  She will need a surgical clearance and her Coumadin will need to be held for the surgery  4  Will defer additional workup regarding a potential hematologic  malignancy to Dr Brian Bustos  5  Will check procalcitonin  Her leukocytosis is likely reactive and antibiotics can be stopped  6  I attempted to contact her daughter to discuss these options  I will continue to try to reach out  History of Present Illness   Physician Requesting Consult: Go Crain DO  Reason for Consult / Principal Problem:  Vulvar mass, lymphadenopathy, lung lesion  Hx and PE limited by:   HPI: Clari Maria is a 80y o  year old female who presents with weakness and fatigue, progressive  She was unable to stand at home  She had intermittent vaginal bleeding  She has at least 10 lb weight loss over the last 3 months that is unintentional   She saw gynecology on 1/11/2021 and a vulvar biopsy was performed of the mass which revealed MUKESH 3  On admission to the hospital, she had a CT scan of the chest abdomen and pelvis that I personally reviewed  This revealed a spiculated right upper lobe lung nodule measuring 2 x 1 3 cm, a precarinal lymph node, and left inguinal lymphadenopathy measuring 1 8 cm the largest   There was no peritoneal disease  No obvious periaortic lymphadenopathy  She noted the mass in approximately April of 2020 initially thought that it was an infection  It did not resolve and grew over time  It is now causing discomfort and she states that it is 1 of the reasons why she is unable to walk  She has a medical history notable for type 2 diabetes, anemia, paroxysmal atrial fibrillation requiring therapeutic anticoagulation with Coumadin  Serum creatinine 1 39 mg/dL  Her albumin is 1 8  Her most recent CBC demonstrated a total white blood cell count of 25 5 with a platelet count of [id number]  Hemoglobin 9 7 grams/deciliter        Inpatient consult to Gynecologic Oncology  Consult performed by: Alyson Frederick MD  Consult ordered by: Parmjit Mccormick DO          Review of Systems   Constitutional: Positive for activity change, appetite change, fatigue and unexpected weight change  Genitourinary: Positive for genital sores  Musculoskeletal: Positive for arthralgias, gait problem and joint swelling  Skin: Positive for wound  Neurological: Positive for weakness  All other systems reviewed and are negative        Historical Information   Previous Oncology History: n/a  Past Medical History:   Diagnosis Date    Atrial fibrillation ([address] )     Basal cell carcinoma     Hyperlipidemia     Hypothyroid     Kidney disease      Past Surgical History:   Procedure Laterality Date    CARDIAC PACEMAKER PLACEMENT      MOHS SURGERY  1998    shaving lesion, on face below left side of nose basal cell carcinoma     OB/GYN History: P6  Family History   Problem Relation Age of Onset    Alzheimer's disease Mother     Parkinsonism Mother         symptomatic    Macular degeneration Mother     Stroke Father     Vascular Disease Father     Heart disease Maternal Uncle         cardiac    COPD Son     Heart disease Maternal Uncle     Heart disease Maternal Uncle     Heart disease Maternal Uncle     Heart disease Maternal Uncle      Social History   Social History     Substance and Sexual Activity   Alcohol Use Yes    Alcohol/week: 0 0 - 1 0 standard drinks     Social History     Substance and Sexual Activity   Drug Use No     E-Cigarette/Vaping    E-Cigarette Use Never User      E-Cigarette/Vaping Substances     Social History     Tobacco Use   Smoking Status Former Smoker    Quit date:     Years since quittin 1   Smokeless Tobacco Never Used         Meds/Allergies   all current active meds have been reviewed and current meds:   Current Facility-Administered Medications   Medication Dose Route Frequency    acetaminophen (TYLENOL) tablet 650 mg  650 mg Oral Q6H PRN    ALPRAZolam (XANAX) tablet 0 5 mg  0 5 mg Oral HS PRN    ciprofloxacin (CIPRO) tablet 500 mg  500 mg Oral Q24H    glycerin-hypromellose- (ARTIFICIAL TEARS) ophthalmic solution 1 drop  1 drop Both Eyes Q6H PRN    insulin lispro (HumaLOG) 100 units/mL subcutaneous injection 1-6 Units  1-6 Units Subcutaneous TID AC    insulin lispro (HumaLOG) 100 units/mL subcutaneous injection 1-6 Units  1-6 Units Subcutaneous HS    levothyroxine tablet 100 mcg  100 mcg Oral Early Morning    metoprolol succinate (TOPROL-XL) 24 hr tablet 150 mg  150 mg Oral BID    metroNIDAZOLE (FLAGYL) tablet 500 mg  500 mg Oral Q8H [address]    multivitamin-minerals (CENTRUM) tablet 1 tablet  1 tablet Oral Daily    pravastatin (PRAVACHOL) tablet 80 mg  80 mg Oral Daily With Dinner    saccharomyces boulardii (FLORASTOR) capsule 250 mg  250 mg Oral BID    warfarin (COUMADIN) tablet 2 5 mg  2 5 mg Oral Daily (warfarin)       Allergies   Allergen Reactions    Other Other (See Comments)     Category: Adverse Reaction; Annotation - [id number]: HORSE SERUM - Pt states was used several years ago in tetanus boosters when she was a child  Objective   No intake or output data in the 24 hours ending 02/04/21 1814  Invasive Devices     Peripheral Intravenous Line            Peripheral IV 02/04/21 Dorsal (posterior); Right Forearm less than 1 day              Physical Exam  Vitals signs reviewed  Exam conducted with a chaperone present  Constitutional:       General: She is not in acute distress  Appearance: Normal appearance  She is normal weight  She is not ill-appearing, toxic-appearing or diaphoretic  HENT:      Head: Normocephalic and atraumatic  Nose: Nose normal    Eyes:      General: No scleral icterus  Right eye: No discharge  Left eye: No discharge  Extraocular Movements: Extraocular movements intact  Conjunctiva/sclera: Conjunctivae normal    Neck:      Musculoskeletal: Normal range of motion   No neck rigidity or muscular tenderness  Pulmonary:      Effort: Pulmonary effort is normal  No respiratory distress  Breath sounds: No stridor  No wheezing  Abdominal:      General: Abdomen is flat  There is no distension  Palpations: Abdomen is soft  There is no mass  Tenderness: There is no abdominal tenderness  There is no guarding or rebound  Hernia: No hernia is present  Genitourinary:     Comments: Limited backside examination revealed at least an 8 cm exophytic mass on the left nidia vulva grossly consistent with vulvar cancer  There is surrounding edema  The mass approaches the midline  Musculoskeletal:         General: Tenderness present  No swelling or deformity  Right lower leg: No edema  Left lower leg: No edema  Lymphadenopathy:      Cervical: No cervical adenopathy  Skin:     General: Skin is warm and dry  Coloration: Skin is not jaundiced or pale  Findings: Bruising present  No erythema or rash  Neurological:      General: No focal deficit present  Mental Status: She is alert and oriented to person, place, and time  Motor: Weakness present  Psychiatric:         Mood and Affect: Mood normal          Behavior: Behavior normal          Thought Content: Thought content normal          Judgment: Judgment normal          Lab Results:  I personally reviewed CBC, comprehensive metabolic panel, INR, pathology results  Imaging Studies: I have personally reviewed pertinent reports  and I have personally reviewed pertinent films in PACS  EKG, Pathology, and Other Studies: I have personally reviewed pertinent reports  Code Status: Level 3 - DNAR and DNI  Advance Directive and Living Will:      Power of :    POLST:      Counseling / Coordination of Care  Total floor / unit time spent today 70 minutes  Greater than 50% of total time was spent with the patient and / or family counseling and / or coordination of care   A description of the counseling / coordination of care:  Regarding treatment for vulvar cancer

## 2021-02-04 NOTE — TELEPHONE ENCOUNTER
Pt's daughter called due to missing a phone call from Dr Lauren Wolff she was waiting to hear from him and she would please like a phone call back from Dr Lauren rodriguez  2/5 in the AM before 11am

## 2021-02-04 NOTE — CONSULTS
Daiana Anna  1935    HEMATOLOGY/ONCOLOGY CONSULTATION REPORT  DISCUSSION/SUMMARY:    51-year-old female admitted with progressive weakness and fatigue  Issues:    Leukocytosis  The differential demonstrates a left shift; other cell lines are also elevated  (lymphocytes are decreased)  The elevated white count may be secondary /reactive but there is always the possibility of a primary bone marrow disorder  To start off with, peripheral blood will be sent for flow cytometry  Patient is on ciprofloxacin  Continue to monitor the WBC trend  If possible, it would be helpful to see a CBC/differential from 6 months, 1 year, 3 years etc ago  Vulvar lesion  Recent biopsy demonstrated vulvar intraepithelial neoplasia, differentiated (simplex) type (dVIN), 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 with associated spongiosis and inflammation  Possibly this lesion is the cause for the elevated white count  Patient will be seen by Dr Lindsey Villanueva, gyn oncology  Normocytic anemia  This also may be associated with the above  The hemoglobin level is low but okay / acceptable for the time being  Anemia workup can be performed later on when the patient is better  Continue to monitor the trend  Elevated PT/ INR  This may have been spurious  The PTT was within normal limits  No clinical signs of excessive bruising /bleeding  PT/ INR can be re-checked in the morning  Pulmonary nodule  Patient has a distant tobacco history  Obvious concern would be a malignancy  IR has been consulted  Results will determine further workup and treatment  The above was discussed with the patient; all questions were answered  Will follow with you  Please do not hesitate to contact me if you have any questions or need additional information    Thank you for this consult     _________________________________________________________________________________    Chief Complaint   Patient presents with    Weakness - Generalized patient c/o weakness for a month that has gotten progressively worse  States she went to the bathroom today and could not get up  History of Present Illness:   27-year-old female presented to the emergency room on February 1, 2021 with progressive weakness and fatigue  Patient was recently found to have a vulvar mass and had a pending gyn oncology evaluation  The lesion apparently had been bleeding intermittently  Patient also has history of weight loss with decreased appetite  Additional workup demonstrated leukocytosis and anemia  Mrs Tay Shelley was found in bed in no apparent distress  Patient was responsive to questioning  Patient stated that she had some left wrist pain, etiology unclear  Pelvic pain is about the same as before  Appetite is +/- but no nausea or vomiting  No obvious GI bleeding  No shortness of breath at rest, no dyspnea on exertion  Patient denied any known prior CBC abnormalities  Review of Systems   Constitutional: Positive for activity change and fatigue  HENT: Negative  Eyes: Negative  Respiratory: Negative  Cardiovascular: Negative  Gastrointestinal: Negative  Endocrine: Negative  Genitourinary: Negative  Musculoskeletal: Negative  Skin: Negative  Allergic/Immunologic: Negative  Neurological: Positive for weakness  Hematological: Negative  Psychiatric/Behavioral: Negative  All other systems reviewed and are negative      Patient Active Problem List   Diagnosis    Non-ST elevation (NSTEMI) myocardial infarction [facility])    Peripheral vascular disease (HCC)    Paroxysmal atrial fibrillation (HCC)    Recurrent major depressive disorder ([facility] [address] [facility]    Acquired hypothyroidism    Pain in both feet    Peripheral arteriosclerosis ([facility] [address] )    Onychomycosis    Ingrown toenail    Paronychia of toenail    Macular degeneration    Cardiac resynchronization therapy pacemaker (CRT-P) in place    Type 2 diabetes mellitus with diabetic chronic kidney disease ([facility] )    Chronic kidney disease, stage 2 (mild)    Type 2 diabetes mellitus with diabetic peripheral angiopathy without gangrene ([facility] )    Acute on chronic kidney failure ([place] [address] )    Vulvar intraepithelial neoplasia, differentiated-type (dVIN)    History of sick sinus syndrome    Pulmonary nodule    Generalized muscle weakness    Diverticulosis with fat stranding and leukocytosis    Anemia    UTI (urinary tract infection)     Past Medical History:   Diagnosis Date    Atrial fibrillation ([place] [address] )     Basal cell carcinoma     Hyperlipidemia     Hypothyroid     Kidney disease      Past Surgical History:   Procedure Laterality Date    CARDIAC PACEMAKER PLACEMENT      MOHS SURGERY  1998    shaving lesion, on face below left side of nose basal cell carcinoma     Family History   Problem Relation Age of Onset    Alzheimer's disease Mother     Parkinsonism Mother         symptomatic    Macular degeneration Mother     Stroke Father     Vascular Disease Father     Heart disease Maternal Uncle         cardiac    COPD Son     Heart disease Maternal Uncle     Heart disease Maternal Uncle     Heart disease Maternal Uncle     Heart disease Maternal Uncle      Social History     Socioeconomic History    Marital status:      Spouse name: Not on file    Number of children: Not on file    Years of education: Not on file    Highest education level: Not on file   Occupational History    Not on file   Social Needs    Financial resource strain: Not on file    Food insecurity     Worry: Not on file     Inability: Not on file    Transportation needs     Medical: Not on file     Non-medical: Not on file   Tobacco Use    Smoking status: Former Smoker     Quit date:      Years since quittin 1    Smokeless tobacco: Never Used   Substance and Sexual Activity    Alcohol use:  Yes     Alcohol/week: 0 0 - 1 0 standard drinks    Drug use: No    Sexual activity: Not Currently Lifestyle    Physical activity     Days per week: Not on file     Minutes per session: Not on file    Stress: Not on file   Relationships    Social connections     Talks on phone: Not on file     Gets together: Not on file     Attends [employer]ical service: Not on file     Active member of club or organization: Not on file     Attends meetings of clubs or organizations: Not on file     Relationship status: Not on file    Intimate partner violence     Fear of current or ex partner: Not on file     Emotionally abused: Not on file     Physically abused: Not on file     Forced sexual activity: Not on file   Other Topics Concern    Not on file   Social History Narrative    Not on file       Current Facility-Administered Medications:     ALPRAZolam Pepper Ree) tablet 0 5 mg, 0 5 mg, Oral, HS PRN, Enid Bile, DO    ciprofloxacin (CIPRO) tablet 500 mg, 500 mg, Oral, Q24H, Ramon Valiente Liter, DO, 500 mg at 02/03/21 1707    glycerin-hypromellose- (ARTIFICIAL TEARS) ophthalmic solution 1 drop, 1 drop, Both Eyes, Q6H PRN, Ramon Horne, DO    insulin lispro (HumaLOG) 100 units/mL subcutaneous injection 1-6 Units, 1-6 Units, Subcutaneous, TID AC, 1 Units at 02/03/21 1707 **AND** Fingerstick Glucose (POCT), , , TID AC, Ramon Horne, DO    insulin lispro (HumaLOG) 100 units/mL subcutaneous injection 1-6 Units, 1-6 Units, Subcutaneous, HS, Ramon Horne, DO    levothyroxine tablet 100 mcg, 100 mcg, Oral, Early Morning, Olamide Montanez, DO, 100 mcg at 02/04/21 5858    metoprolol succinate (TOPROL-XL) 24 hr tablet 150 mg, 150 mg, Oral, BID, Ramon Horne, DO, 150 mg at 02/03/21 2246    metroNIDAZOLE (FLAGYL) tablet 500 mg, 500 mg, Oral, Q8H [address], Ramon Horne, DO, 500 mg at 02/04/21 2333    multivitamin-minerals (CENTRUM) tablet 1 tablet, 1 tablet, Oral, Daily, Vickie Horne, DO, 1 tablet at 02/03/21 0846    pravastatin (PRAVACHOL) tablet 80 mg, 80 mg, Oral, Daily With Dinner, Ramon Horne DO, 80 mg at 02/03/21 1707    saccharomyces boulardii (FLORASTOR) capsule 250 mg, 250 mg, Oral, BID, Anastasia Escalantedhi, DO, 250 mg at 02/03/21 1707    warfarin (COUMADIN) tablet 2 5 mg, 2 5 mg, Oral, Daily (warfarin), Chris Spicerkofi, DO, 2 5 mg at 02/03/21 1707    Allergies   Allergen Reactions    Other Other (See Comments)     Category: Adverse Reaction; Annotation - [id number]: HORSE SERUM - Pt states was used several years ago in tetanus boosters when she was a child  Vitals:    02/04/21 0300   BP: 137/63   Pulse: 73   Resp: 17   Temp: 99 °F (37 2 °C)   SpO2: 97%     Physical Exam  Constitutional:       Appearance: She is well-developed  Comments: Elderly female, somewhat frail-appearing, no respiratory distress, no signs of pain   HENT:      Head: Normocephalic and atraumatic  Right Ear: External ear normal       Left Ear: External ear normal    Eyes:      Conjunctiva/sclera: Conjunctivae normal       Pupils: Pupils are equal, round, and reactive to light  Neck:      Musculoskeletal: Normal range of motion and neck supple  Cardiovascular:      Rate and Rhythm: Normal rate and regular rhythm  Heart sounds: Normal heart sounds  Pulmonary:      Effort: Pulmonary effort is normal       Breath sounds: Normal breath sounds  Comments: Fair to good air entry bilaterally, few scattered rhonchi bilaterally  Abdominal:      General: Bowel sounds are normal       Palpations: Abdomen is soft  Comments: Soft, nontender, cannot palpate liver or spleen, +bowel sounds, old suture line   Genitourinary:     Comments: Deferred  Musculoskeletal: Normal range of motion  Skin:     General: Skin is warm  Comments: Warm, moist, relatively good color, scattered few purpura   Neurological:      Mental Status: She is alert and oriented to person, place, and time  Deep Tendon Reflexes: Reflexes are normal and symmetric  Psychiatric:         Behavior: Behavior normal          Thought Content:  Thought content normal          Judgment: Judgment normal      Extremities:  0-1 +bilateral lower extremity edema, no cords, pulses are 1+  Lymphatics:  Limited exam, no supraclavicular, cervical or axillary adenopathy bilaterally    Labs          02/04/2021 BUN = 21 creatinine = 1 39 calcium = 9 6 AST = 19 ALT = 14 alkaline phosphatase = 92 total protein = 6 0 albumin = 1 8 total bilirubin = 0 50     02/01/2021 PT = 20 INR = 1 73 PTT = 33    Imaging    02/01/2021 CT scan chest abdomen pelvis    1  Irregular pulmonary nodule in the medial right upper lobe abutting the superior mediastinum measuring 2 0 x 1 3 cm  Based on current Fleischner Society 2017 Guidelines on incidental pulmonary nodule, PET/CT scan evaluation or tissue sampling may be considered for further evaluation  2   Additional groundglass nodules measuring up to 7 mm above  3   Mild emphysema  4   Nonspecific enlarged precarinal lymph node      ABDOMEN AND PELVIS:  1  Diverticulosis coli  Possible segmental perisigmoid fat stranding may be due to acute diverticulitis, although evaluation is limited  2   Left greater than right nonspecific perinephric fat stranding, grossly similar to prior  No hydronephrosis  3  Prominent to mildly enlarged left inguinal lymph nodes, nonspecific      Pathology    Case Report   Surgical Pathology Report                         Case: [id number]                                    Authorizing Provider: Faith Joseph MD         Collected:           01/11/2021 1843               Ordering Location:     [facility] Received:            01/11/2021 1843                                      OB/GYN [facility]                                                                 Pathologist:           Ayanna Jeff MD                                                              Specimen:    Vulva, left vulva (mass)                                                                   Final Diagnosis   A  Vulva, left vulva (mass), biopsy:  - Vulvar intraepithelial neoplasia, differentiated (simplex) type (dVIN), 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

## 2021-02-04 NOTE — PLAN OF CARE
Problem: Potential for Falls  Goal: Patient will remain free of falls  Description: INTERVENTIONS:  - Assess patient frequently for physical needs  -  Identify cognitive and physical deficits and behaviors that affect risk of falls  -  [place] fall precautions as indicated by assessment   - Educate patient/family on patient safety including physical limitations  - Instruct patient to call for assistance with activity based on assessment  - Modify environment to reduce risk of injury  - Consider OT/PT consult to assist with strengthening/mobility  Outcome: Progressing     Problem: Prexisting or High Potential for Compromised Skin Integrity  Goal: Skin integrity is maintained or improved  Description: INTERVENTIONS:  - Identify patients at risk for skin breakdown  - Assess and monitor skin integrity  - Assess and monitor nutrition and hydration status  - Monitor labs   - Assess for incontinence   - Turn and reposition patient  - Assist with mobility/ambulation  - Relieve pressure over bony prominences  - Avoid friction and shearing  - Provide appropriate hygiene as needed including keeping skin clean and dry  - Evaluate need for skin moisturizer/barrier cream  - Collaborate with interdisciplinary team   - Patient/family teaching  - Consider wound care consult   Outcome: Progressing     Problem: Nutrition/Hydration-ADULT  Goal: Nutrient/Hydration intake appropriate for improving, restoring or maintaining nutritional needs  Description: Monitor and assess patient's nutrition/hydration status for malnutrition  Collaborate with interdisciplinary team and initiate plan and interventions as ordered  Monitor patient's weight and dietary intake as ordered or per policy  Utilize nutrition screening tool and intervene as necessary  Determine patient's food preferences and provide high-protein, high-caloric foods as appropriate       INTERVENTIONS:  - Monitor oral intake, urinary output, labs, and treatment plans  - Assess nutrition and hydration status and recommend course of action  - Evaluate amount of meals eaten  - Assist patient with eating if necessary   - Allow adequate time for meals  - Recommend/ encourage appropriate diets, oral nutritional supplements, and vitamin/mineral supplements  - Order, calculate, and assess calorie counts as needed  - Recommend, monitor, and adjust tube feedings and TPN/PPN based on assessed needs  - Assess need for intravenous fluids  - Provide specific nutrition/hydration education as appropriate  - Include patient/family/caregiver in decisions related to nutrition  Outcome: Progressing

## 2021-02-04 NOTE — ASSESSMENT & PLAN NOTE
[id number]  On coumadin  Follows with [facility] cardiology (Dr Wily Nguyen visit 8/11/2020)  · Continue home metoprolol 150 mg p o   BID  · Telemetry  · Start patient on 2 5 mg coumadin daily, and monitor INR  · Aware patient on antibiotics which may affect P450 metabolism  · INR 2 06 on 2/2, 2 29 on 2/3, 2 47 on 2/4 continue current regimen

## 2021-02-04 NOTE — PLAN OF CARE
Problem: Potential for Falls  Goal: Patient will remain free of falls  Description: INTERVENTIONS:  - Assess patient frequently for physical needs  -  Identify cognitive and physical deficits and behaviors that affect risk of falls  -  [place] fall precautions as indicated by assessment   - Educate patient/family on patient safety including physical limitations  - Instruct patient to call for assistance with activity based on assessment  - Modify environment to reduce risk of injury  - Consider OT/PT consult to assist with strengthening/mobility  Outcome: Progressing     Problem: Prexisting or High Potential for Compromised Skin Integrity  Goal: Skin integrity is maintained or improved  Description: INTERVENTIONS:  - Identify patients at risk for skin breakdown  - Assess and monitor skin integrity  - Assess and monitor nutrition and hydration status  - Monitor labs   - Assess for incontinence   - Turn and reposition patient  - Assist with mobility/ambulation  - Relieve pressure over bony prominences  - Avoid friction and shearing  - Provide appropriate hygiene as needed including keeping skin clean and dry  - Evaluate need for skin moisturizer/barrier cream  - Collaborate with interdisciplinary team   - Patient/family teaching  - Consider wound care consult   Outcome: Progressing     Problem: Nutrition/Hydration-ADULT  Goal: Nutrient/Hydration intake appropriate for improving, restoring or maintaining nutritional needs  Description: Monitor and assess patient's nutrition/hydration status for malnutrition  Collaborate with interdisciplinary team and initiate plan and interventions as ordered  Monitor patient's weight and dietary intake as ordered or per policy  Utilize nutrition screening tool and intervene as necessary  Determine patient's food preferences and provide high-protein, high-caloric foods as appropriate       INTERVENTIONS:  - Monitor oral intake, urinary output, labs, and treatment plans  - Assess nutrition and hydration status and recommend course of action  - Evaluate amount of meals eaten  - Assist patient with eating if necessary   - Allow adequate time for meals  - Recommend/ encourage appropriate diets, oral nutritional supplements, and vitamin/mineral supplements  - Order, calculate, and assess calorie counts as needed  - Recommend, monitor, and adjust tube feedings and TPN/PPN based on assessed needs  - Assess need for intravenous fluids  - Provide specific nutrition/hydration education as appropriate  - Include patient/family/caregiver in decisions related to nutrition  Outcome: Progressing

## 2021-02-04 NOTE — ASSESSMENT & PLAN NOTE
Patient presents with new onset weakness with inability to rise from toilet with 1 month of fatigue, reduced appetite, and weight loss  Etiology unknown  In context of patient with newly diagnosed vulvar intraepithelial neoplasia, has history of tobacco use, history of diverticulosis, uncontrolled diabetes, and arrhythmia    · CT chest, abdomen, pelvis:  Right-sided pulmonary nodule 2x1 3cm and enlarged arnulfo carinal lymph, Sigmoid fat stranding and enlarged left inguinal lymph node  · Abnormal UA (follow up urine culture)  · WBC: 24 56-> 25 5 (persistent leukocytosis)  · CBC - increased leukocytosis and concomitant increase in hemoglobin and platelets  · Normoglycemia reduces suspicion for infection  · Continue Abx  · TSH 0 322  · Fall precautions  · Follow up blood NGTD and Urine cultures positive for E coli (sensitive to ciprofloxacin)  · Patient expressed preference for outpatient investigation/management for malignancy  · Patient feels improved from admission, but jessy as yesterday  · Consult Oncology - help appreciated

## 2021-02-04 NOTE — ASSESSMENT & PLAN NOTE
Lab Results   Component Value Date    HGBA1C 5 8 09/05/2019       Recent Labs     02/03/21  1110 02/03/21  1621 02/03/21  2128 02/04/21  0754   POCGLU 135 161* 125 103       Blood Sugar Average: Last 72 hrs:  (P) 120 [id number] today 258  Start patient on correction scale  Blood sugar has been within tolerable range

## 2021-02-04 NOTE — CONSULTS
Consultation - Infectious Disease   Ruthann Soares 80 y o  female MRN: [id number]  Unit/Bed#: [address] [place] [address] Encounter: [id number]    Extensive review of the medical records in Epic including review of the notes, radiographs, and laboratory results  IMPRESSION:    Leukocytosis:  Likely due to vulvar mass  Patient is afebrile and hemodynamically stable  Of note leukocytosis has persisted despite treatment with ciprofloxacin and metronidazole for presumed diverticulitis   Possible acute diverticulitis as CT demonstrated segmental perisigmoid fat stranding  Appears to be clinically improving other than leukocytosis  She has tolerated diet advancement   Asymptomatic bacteruria   Vular mass:  Recent biopsy demonstrated vulvar intraepithelial neoplasia  This may be the cause of her leukocytosis   KIKE on CKD stg 3:  Creatinine is improving towards baseline   Advanced age    RECOMMENDATIONS:    ESR, Crp   Consider RT knee ultrasound to assess for effusion  If effusion is present, consider arthrocentesis   Follow left wrist x-ray   Diagnosis of acute diverticulitis is dubious at this point as patient denies abdominal pain at present and has tolerated diet advancement   Suggest discontinuing ciprofloxacin and metronidazole   Gyn-Onc is following with consideration for palliative surgery   Monitor clinical response, temperature curve, WBC count  My recommendations were discussed with the patient in detail who verbalized understanding  Thank you for allowing me to participate in the care of this patient  The ID service will follow  HISTORY OF PRESENT ILLNESS:  Reason for Consult: Persistent leukocytosis  HPI: Ruthann Soares is a 80y o  year old woman with PMH of afib, CKD who was admitted on 2/1  She presented with worsening fatigue over the past 1 month  On the day of admission she went to the toilet to urinate, and was unable to stand on her own    As an outpatient she was noted to have a vulvar mass for which she underwent biopsy as she was having intermittent vaginal pain and bleeding  Initial CT scan of abdomen was concerning for possible diverticulitis and patient was treated empirically with ciprofloxacin and metronidazole  She has had a persistent and worsening leukocytosis and ID service was consulted  Patient is tolerating current antibiotics  She reports left hand and wrist swelling which she noted near her peripheral IV site  This is has improved following peripheral IV removal  She also reports RT knee swelling and pain  She denies abdominal pain, nausea, vomiting, diarrhea  She has tolerated advancement in her diet and was able to eat her dinner today  REVIEW OF SYSTEMS:  Constitutional: Positive for chills and fatigue  Negative for appetite change, fever  HENT:  Positive for postnasal drip  Negative for congestion  Eyes: Hx of macular degeneration, no acute visual disturbance  Respiratory:  Occasional cough  Negative for shortness of breath and wheezing  Cardiovascular: Negative for chest pain, palpitations  Gastrointestinal: Negative for abdominal pain, diarrhea and nausea  Genitourinary: Positive for genital sores and vaginal pain  Negative for difficulty urinating, dysuria, urgency  Musculoskeletal: Positive for joint swelling (Left wrist) and myalgias ( left wrist and right knee)  Skin: Negative for rash  Neurological: Positive for numbness of feet  Negative for headaches  Hematological: Negative for adenopathy  Does not bruise/bleed easily  Psychiatric/Behavioral: Hx of anxiety for which she takes Xanax prn      PAST MEDICAL HISTORY:  Past Medical History:   Diagnosis Date    Atrial fibrillation ([facility] [address] )     Basal cell carcinoma 1999    Hyperlipidemia     Hypothyroid     Kidney disease      Past Surgical History:   Procedure Laterality Date    CARDIAC PACEMAKER PLACEMENT      MOHS SURGERY  01/1998    shaving lesion, on face below left side of nose basal cell carcinoma     FAMILY HISTORY:  Negative for immunodeficiency    SOCIAL HISTORY:  Social History   Social History     Substance and Sexual Activity   Alcohol Use Yes    Alcohol/week: 0 0 - 1 0 standard drinks     Social History     Substance and Sexual Activity   Drug Use No     Social History     Tobacco Use   Smoking Status Former Smoker    Quit date:     Years since quittin 1   Smokeless Tobacco Never Used     ALLERGIES:  Allergies   Allergen Reactions    Other Other (See Comments)     Category: Adverse Reaction; Annotation - [id number]: HORSE SERUM - Pt states was used several years ago in tetanus boosters when she was a child  MEDICATIONS:  All current active medications have been reviewed    Antibiotics: cipro, metronidazole  Scheduled Meds:  Current Facility-Administered Medications   Medication Dose Route Frequency Provider Last Rate    acetaminophen  650 mg Oral Q6H PRN Kostas Horne, DO      ALPRAZolam  0 5 mg Oral HS PRN Kostas Horne, DO      ciprofloxacin  500 mg Oral Q24H Ramon Frost, DO      glycerin-hypromellose-  1 drop Both Eyes Q6H PRN Kostas Horne, DO      insulin lispro  1-6 Units Subcutaneous TID AC Ramon Horne, DO      insulin lispro  1-6 Units Subcutaneous HS Ramon Frost, DO      levothyroxine  100 mcg Oral Early Morning Olamide Montanez, DO      metoprolol succinate  150 mg Oral BID Ramon Horne, DO      metroNIDAZOLE  500 mg Oral [facility] Satish Minor, DO      multivitamin-minerals  1 tablet Oral Daily Kostas Frost, DO      pravastatin  80 mg Oral Daily With [employer], DO      saccharomyces boulardii  250 mg Oral BID Jinx Li, DO      warfarin  2 5 mg Oral Daily (warfarin) Kostas Horne, DO       Continuous Infusions:   PRN Meds:   acetaminophen    ALPRAZolam    glycerin-hypromellose-     PHYSICAL EXAM:  Temp:  [97 °F (36 1 °C)-99 7 °F (37 6 °C)] 97 °F (36 1 °C)  HR:  [63-77] 63  Resp: [16-18] 18  BP: (100-137)/(57-63) 100/57  SpO2:  [95 %-97 %] 96 %  Temp (24hrs), Av 6 °F (37 °C), Min:97 °F (36 1 °C), Max:99 7 °F (37 6 °C)  Current: Temperature: (!) 97 °F (36 1 °C)  No intake or output data in the 24 hours ending 21 1804  General Appearance:  Elderly woman, resting in bed, nontoxic, no acute distress   Head:  Normocephalic, without obvious abnormality, atraumatic   Eyes:  EOMI, Conjunctiva pink and sclera anicteric, both eyes   Nose: Nares normal, mucosa normal, no drainage   Throat: Oropharynx moist    Lungs:  Clear to auscultation bilaterally, respirations unlabored   Heart:   S1, S2, regular rate,rhythm, no murmur   Abdomen: Soft, non-tender, non-distended   :  No Lopez catheter present   Extremities:   LT wrist and hand are swollen, tender to light palpation, no erythema  Right knee warmth, edema and limited range of motion in flexion noted, tender to light palpation  Skin: No rash   Neurologic: Alert and oriented x  3, conversant, fluent speech   Psychiatric: Calm, cooperative with exam and interview     LABS, IMAGING, & OTHER STUDIES:  Lab Results:  I have personally reviewed pertinent labs  Results from last 7 days   Lab Units 21  0509 21  0546 21  0444   WBC Thousand/uL 25 50* 24 56* 18 28*   HEMOGLOBIN g/dL 9 7* 10 0* 9 2*   PLATELETS Thousands/uL 276 271 245     Results from last 7 days   Lab Units 21  0509 21  0546 21  0444   SODIUM mmol/L 134* 134* 135*   POTASSIUM mmol/L 4 0 4 2 3 6   CHLORIDE mmol/L 102 103 105   CO2 mmol/L 25 24 25   BUN mg/dL 21 17 22   CREATININE mg/dL 1 39* 1 25 1 33*   EGFR ml/min/1 73sq m 35 39 36   CALCIUM mg/dL 9 6 9 4 9 3   AST U/L 19 14 16   ALT U/L 14 14 16   ALK PHOS U/L 92 94 83     Results from last 7 days   Lab Units 21  1441 21  1415   BLOOD CULTURE   --  No Growth at 48 hrs  No Growth at 48 hrs     URINE CULTURE  >100,000 cfu/ml Escherichia coli*  >100,000 cfu/ml   --      Imaging Studies:   2/1 pCXR: No focal consolidation, pleural effusion, or pneumothorax  2/1 CT abd/pelv:    1  Diverticulosis coli  Possible segmental perisigmoid fat stranding may be due to acute diverticulitis, although evaluation is limited  2   Left greater than right nonspecific perinephric fat stranding, grossly similar to prior  No hydronephrosis  3  Prominent to mildly enlarged left inguinal lymph nodes, nonspecific  I have personally reviewed pertinent imaging study reports

## 2021-02-04 NOTE — ASSESSMENT & PLAN NOTE
Potential biopsy  · IR consulted discontinued  · Patient expresses she does not wish to undergo biopsy this  · She is aware she cannot have biopsy concomitantly with vulvar mass surgery  · Oncology on board

## 2021-02-04 NOTE — ASSESSMENT & PLAN NOTE
Lab Results   Component Value Date    EGFR 35 02/04/2021    EGFR 39 02/03/2021    EGFR 36 02/02/2021    CREATININE 1 39 (H) 02/04/2021    CREATININE 1 25 02/03/2021    CREATININE 1 33 (H) 02/02/2021   · Acute on chronic injury  · Patient receive 1L NS in ED  · Continue to monitor renal function  · LR 120ml/hr discontinued  · Correct electrolyte abnormalities  · Hold Diovan/losartan, consider restarting tomorrow if renal function continues to improve

## 2021-02-05 PROBLEM — M25.561 RIGHT KNEE PAIN: Status: ACTIVE | Noted: 2021-01-01

## 2021-02-05 NOTE — ASSESSMENT & PLAN NOTE
CT: Diverticulosis coli  Possible segmental perisigmoid fat stranding may be due to acute diverticulitis, although evaluation is limited    · Surgical soft diet  · LR discontinued  · Continue Cipro 400mg p o  q12h and metronidazole 500mg p o  q8H  · Trend vitals, WBCs, and exam findings/symptoms  · Leukocytisis fluctuating 23 on 2/1 to 18 on 2/2 24 5 on 2/3, 25 5 on 2/4  · Appears to change in sync with hgb and platelets  · Patient tolerated diet advancement well  · ID recommended discontinuation of antibiotics

## 2021-02-05 NOTE — PROGRESS NOTES
Progress Note - Caprice Boswell 1935, 80 y o  female MRN: [id number]    Unit/Bed#: [address] [place] [address] Encounter: [id number]    Primary Care Provider: Gabriela Cavazos DO   Date and time admitted to hospital: 2/1/2021 10:59 AM        * Generalized muscle weakness and persistent leukocytosis  Assessment & Plan  Patient presents with new onset weakness with inability to rise from toilet with 1 month of fatigue, reduced appetite, and weight loss  Etiology unknown  In context of patient with newly diagnosed vulvar intraepithelial neoplasia, has history of tobacco use, history of diverticulosis, uncontrolled diabetes, and arrhythmia  · CT chest, abdomen, pelvis:  Right-sided pulmonary nodule 2x1 3cm and enlarged arnulfo carinal lymph, Sigmoid fat stranding and enlarged left inguinal lymph node  · Abnormal UA (follow up urine culture)  · WBC: 24 56-> 25 5,-> 25 57 (persistent leukocytosis)  · Normoglycemia reduces suspicion for infection  · Discontinue Abx as per ID (help appreciated)  · Infectious disease  · CRP, ESR both elevated  · Fall precautions  · Follow up blood NGTD and Urine cultures positive for E coli (sensitive to ciprofloxacin which patient was on for 4 days) and patient had o symptoms  · Consult Oncology - help appreciated  · Flow cytometry, monitor CBC for WBCs and anemia  · Gyn-Onc  · See recommendations in Vulvar intraepithelial Neoplasia      Vulvar intraepithelial neoplasia, differentiated-type (dVIN)  Assessment & Plan  Patient recently seen in Dr Nae Hauser office on 01/11/2021 and biopsy was taken  Results revealed vulvar intraepithelial neoplasia on 01/19/2021  Biopsy results shown below    Vulvar intraepithelial neoplasia, differentiated (simplex) type (dVIN), 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  -- Confirmed by positive p53 and negative p16/Ki-67 (increased proliferative index) immunostains      -- Associated spongiosis and inflammation (mostly neutrophils and eosinophils); special stain for fungus       (PAS) negative  - No definitive invasive carcinoma identified  Note: Although no definitive invasive carcinoma is identified, the biopsy is superficial and may not be representative of the entire lesion  Patient has (had) appointment scheduled with Dr Ligia Oseguera (gynecology Oncology) on 2/4  Patient is reluctant to further workup for pulmonary nodule this time  Monitor leukocytosis and symptoms in determining disposition     Gyne-Onc (help appreciated)  · Plan for surgery Tuesday 8:15 a m  · NPO after midnight 2/8 to 2/9  · If Monday INR still >2 give small dose vit K  · Procal elevated at 1 26    Pulmonary nodule  Assessment & Plan  Potential biopsy  · IR consulted discontinued  · Patient expresses she does not wish to undergo biopsy this  · She is aware she cannot have biopsy concomitantly with vulvar mass surgery  · Oncology on board    Diverticulosis with fat stranding and leukocytosis  Assessment & Plan  CT: Diverticulosis coli  Possible segmental perisigmoid fat stranding may be due to acute diverticulitis, although evaluation is limited    · Surgical soft diet  · LR discontinued  · Continue Cipro 400mg p o  q12h and metronidazole 500mg p o  q8H  · Trend vitals, WBCs, and exam findings/symptoms  · Leukocytisis fluctuating 23 on 2/1 to 18 on 2/2 24 5 on 2/3, 25 5 on 2/4  · Appears to change in sync with hgb and platelets  · Patient tolerated diet advancement well  · ID recommended discontinuation of antibiotics    Acute on chronic kidney failure [facility])  Assessment & Plan  Lab Results   Component Value Date    EGFR 20 02/05/2021    EGFR 35 02/04/2021    EGFR 39 02/03/2021    CREATININE 2 23 (H) 02/05/2021    CREATININE 1 39 (H) 02/04/2021    CREATININE 1 25 02/03/2021   · Acute on chronic injury  · Continue to monitor renal function  · Plasmalyte 120ml/hr  · Correct electrolyte abnormalities  · Hold Diovan/losartan, consider restarting tomorrow if renal function continues to improve    Type 2 diabetes mellitus with diabetic chronic kidney disease [facility])  Assessment & Plan  Lab Results   Component Value Date    HGBA1C 5 8 09/05/2019       Recent Labs     02/04/21  1643 02/04/21  2037 02/05/21  0744 02/05/21  1139   POCGLU 111 124 95 113       Blood Sugar Average: Last 72 hrs:  (P) 889 [id number]  Start patient on correction scale  Blood sugar has been within tolerable range    Paroxysmal atrial fibrillation [facility])  Assessment & Plan  [id number] 5  On coumadin  Follows with [facility] cardiology (Dr Min Morse visit 8/11/2020)  · Continue home metoprolol 150 mg p o   BID  · Telemetry  · Start patient on 2 5 mg coumadin daily, and monitor INR  · Aware patient on antibiotics which may affect P450 metabolism  · INR 2 06 on 2/2, 2 29 on 2/3, 2 47 on 2/4, 2 59 on 2/5 continue current regimen    Acquired hypothyroidism  Assessment & Plan  Home levothyroxine 112mcg, consider change in dose  · TSH low at 0 322, dose reduced to 100mcg daily      Macular degeneration  Assessment & Plan  Patient reports taking OTC eye drops-> given artificial tears PRN  · Preservision not available in pharmacy  · Reports receiving a medication from her retinal specialist every 2 weeks    Right knee pain  Assessment & Plan  Warms to palpation of lateral aspect and tender with repositioning  US ordered - small to moderate effusion found  ID consulted - help appreciated  Arthrocentesis planned, contact orthopedics (help appreciated)    Left wrist pain  Assessment & Plan  Experiencing 2 day history of left wrist and associated swelling, patient unable to fully describe trauma (may have been caused during assisted transfer)  · Patient has left antecubital peripheral line proximal to edema -> asked nurse to switched to other arm  · Tenderness appears to be in soft tissues  · X-ray read pending  · Patient refusing ice  · Tylenol 650 PO PRN    Ingrown toenail  Assessment & Plan  Right great toe  - sensitivity of left great toe reported today  - continue to monitor for infection    UTI (urinary tract infection)  Assessment & Plan  Asymptomatic  Urine culture positive for >100,000 E  Coli  Tenderness in lower abdomen on examination  Denies urinary symptoms  Patient currently on ciprofloxacin and metronidazole for presumed diverticulitis  · E coli sensitive to ciprofloxacin, which patient took for 3 days    Anemia  Assessment & Plan  11 on 2/1 -> 9 2 on 2/2-> 10 on 2/3-> 10 0 2/4-> 8 9 on 2/5  Continue to monitor      History of sick sinus syndrome  Assessment & Plan  Pace maker in place      ---------------------------------------------------------------------------------------  SUBJECTIVE  Patient seen examined at bedside  Patient reports left wrist feels better since changing IV position  Patient still experiencing wrist discomfort, right knee pain and toe sensitivity  Patient feeling weak, but has had improved appetite  Patient expressed she would still like to be DNI/DNR  She also expressed she would only like to go forth with treatment plan in which positive results are "not only possible but achievable"  She would like to maintain function  She reported familial experiences in which end of life care was less than pleasing  Review of Systems   Constitutional: Positive for chills and fatigue  Negative for appetite change ( interest to advance the diet), diaphoresis and fever  HENT: Negative for congestion  Eyes: Positive for visual disturbance (Macular degeneration)  Respiratory: Negative for chest tightness, shortness of breath and wheezing  Apnea:  degeneration  Cardiovascular: Negative for chest pain, palpitations and leg swelling  Gastrointestinal: Negative for abdominal pain, blood in stool ( dark stool 1 month ago), constipation ( patient reports straining with bowel movements but is regular ), diarrhea and nausea  Endocrine: Positive for cold intolerance     Genitourinary: Positive for genital sores and vaginal pain ( positional)  Negative for difficulty urinating, dysuria, flank pain, urgency and vaginal bleeding  Vaginal mass   Musculoskeletal: Positive for joint swelling (Left wrist) and myalgias (Left wrist improved)  Negative for back pain  Pain of left wrist   Skin: Negative for color change and pallor  Neurological: Negative for headaches  Hematological: Negative for adenopathy  Does not bruise/bleed easily  Psychiatric/Behavioral: Negative for behavioral problems  ---------------------------------------------------------------------------------------  OBJECTIVE    Vitals   Vitals:    21 2100 21 0012 21 0856 21 1525   BP: 100/54 104/60 94/50 102/55   BP Location: Left arm Left arm Right arm Right arm   Pulse:  71 68 66   Resp:  18 18 18   Temp:  98 8 °F (37 1 °C) 98 1 °F (36 7 °C) 97 8 °F (36 6 °C)   TempSrc:  Oral Oral Oral   SpO2:  94% 96% 95%   Weight:       Height:         Temp (24hrs), Av 1 °F (36 7 °C), Min:97 °F (36 1 °C), Max:99 °F (37 2 °C)  Current: Temperature: 97 8 °F (36 6 °C)          Physical Exam  Constitutional:       General: She is not in acute distress  Appearance: Normal appearance  She is normal weight  She is ill-appearing  She is not toxic-appearing or diaphoretic  HENT:      Head: Normocephalic and atraumatic  Mouth/Throat:      Mouth: Mucous membranes are dry  Pharynx: Oropharynx is clear  No oropharyngeal exudate or posterior oropharyngeal erythema  Comments: Scar over left lip s/p BCC excision  Eyes:      General:         Right eye: No discharge  Left eye: No discharge  Extraocular Movements: Extraocular movements intact  Conjunctiva/sclera: Conjunctivae normal    Cardiovascular:      Rate and Rhythm: Normal rate and regular rhythm  Pulses: Normal pulses  Heart sounds: Normal heart sounds  No friction rub  No gallop      Pulmonary:      Effort: Pulmonary effort is normal  No respiratory distress  Breath sounds: Normal breath sounds  No stridor  No wheezing, rhonchi or rales  Comments: Patient coughed during examination and there were small streaks of blood in sputum  Abdominal:      General: Abdomen is flat  Bowel sounds are normal       Palpations: Abdomen is soft  Tenderness: There is abdominal tenderness (Left upper and lower quadrants, with mild discomfort in right lower quadrant)  There is no right CVA tenderness, left CVA tenderness, guarding or rebound  Genitourinary:     Labia:         Left: Tenderness and lesion (mass) present  No injury  Comments: 3cm, tender, parasagittal left labial mass  No purulence, erythema, active bleeding or odor detected  Intertriginous region of gluteal cleft, posterior to lesion is moist and irritated   Musculoskeletal:         General: Swelling ( left wrist with active/passive ROM limited by pain (improved since yesterday), right knee with associated lateral warmth  Left knee also appears to be warmer on lateral aspect) and tenderness (reduced in left wrist since yesterday  Toes and right knee remained sensitive) present  Right lower leg: No edema  Left lower leg: No edema  Lymphadenopathy:      Head:      Right side of head: No submandibular adenopathy  Left side of head: No submandibular adenopathy  Cervical: No cervical adenopathy  Upper Body:      Right upper body: No axillary adenopathy  Left upper body: No axillary adenopathy  Lower Body: No right inguinal adenopathy  No left inguinal adenopathy  Skin:     General: Skin is warm and dry  Coloration: Skin is not pale  Findings: No erythema or rash  Neurological:      General: No focal deficit present  Mental Status: She is alert and oriented to person, place, and time  Mental status is at baseline  Cranial Nerves: No cranial nerve deficit  Sensory: No sensory deficit        Motor: Weakness (LLE 2/5, RLE 3/5, LUE patient deferred, LLE 4-5) present        Coordination: Coordination normal       Comments: Upper extremities 5/5 strength   Psychiatric:         Mood and Affect: Mood normal       Comments: Intermittently agitated/oppositional         Laboratory and Diagnostics:  Results from last 7 days   Lab Units 02/05/21  0609 02/04/21  0509 02/03/21  0546 02/02/21  0444 02/01/21  1124   WBC Thousand/uL 25 57* 25 50* 24 56* 18 28* 23 89*   HEMOGLOBIN g/dL 8 9* 9 7* 10 0* 9 2* 11 0*   HEMATOCRIT % 28 3* 31 2* 32 4* 29 7* 36 5   PLATELETS Thousands/uL 296 276 271 245 305   NEUTROS PCT % 82* 84* 86* 77* 84*   MONOS PCT % 9 10 8 10 5     Results from last 7 days   Lab Units 02/05/21  0609 02/04/21  0509 02/03/21  0546 02/02/21  0444 02/01/21  1124   SODIUM mmol/L 131* 134* 134* 135* 135*   POTASSIUM mmol/L 4 2 4 0 4 2 3 6 3 7   CHLORIDE mmol/L 100 102 103 105 101   CO2 mmol/L 24 25 24 25 26   ANION GAP mmol/L 7 7 7 5 8   BUN mg/dL 46* 21 17 22 20   CREATININE mg/dL 2 23* 1 39* 1 25 1 33* 1 70*   CALCIUM mg/dL 9 4 9 6 9 4 9 3 10 3*   GLUCOSE RANDOM mg/dL 107 113 122 104 258*   ALT U/L 15 14 14 16 20   AST U/L 27 19 14 16 25   ALK PHOS U/L 82 92 94 83 102   ALBUMIN g/dL 1 7* 1 8* 2 0* 2 0* 2 4*   TOTAL BILIRUBIN mg/dL 0 30 0 50 0 50 0 50 0 60     Results from last 7 days   Lab Units 02/05/21  0609 02/04/21  0509 02/03/21  0546 02/02/21  0444 02/01/21  1124   MAGNESIUM mg/dL 2 0 1 8 2 1 1 7 1 9   PHOSPHORUS mg/dL 3 4 3 0 2 9 3 0  --       Results from last 7 days   Lab Units 02/05/21  0609 02/04/21  0509 02/03/21  0546 02/02/21  0445 02/01/21  1130   INR  2 59* 2 47* 2 29* 2 06* 1 73*   PTT seconds  --   --   --   --  33      Results from last 7 days   Lab Units 02/01/21  1124   TROPONIN I ng/mL 0 04     Results from last 7 days   Lab Units 02/01/21  1923 02/01/21  1415   LACTIC ACID mmol/L 2 0 2 1*     ABG:    VBG:    Results from last 7 days   Lab Units 02/05/21  0609   PROCALCITONIN ng/ml 1 26*       Micro  Results from last 7 days   Lab Units 02/01/21  1441 02/01/21  1415   BLOOD CULTURE   --  No Growth at 72 hrs  No Growth at 72 hrs  URINE CULTURE  >100,000 cfu/ml Escherichia coli*  >100,000 cfu/ml   --          Imaging: I have personally reviewed pertinent reports  Intake and Output  I/O       02/03 0701 - 02/04 0700 02/04 0701 - 02/05 0700 02/05 0701 - 02/06 0700    P  O  360      I V  (mL/kg)  10 (0 1)     IV Piggyback       Total Intake(mL/kg) 360 (4 5) 10 (0 1)     Urine (mL/kg/hr)       Total Output       Net +360 +10            Unmeasured Urine Occurrence 4 x            Height and Weights   Height: 5' 8" (172 7 cm)  IBW: 63 9 kg  Body mass index is 26 76 kg/m²  Weight (last 2 days)     None            Nutrition       Diet Orders   (From admission, onward)             Start     Ordered    02/08/21 0001  Diet Cardiovascular; Stress Test (1 Meal); No Caffeine  Diet effective midnight     Comments: *If patient is having a stress test, no caffeine, decaf, or chocolate is to be given*   Question Answer Comment   Diet Type Cardiovascular    Cardiac Stress Test (1 Meal)    Other Restriction(s): No Caffeine    RD to adjust diet per protocol? Yes        02/05/21 1550    02/05/21 1523  Dietary nutrition supplements  Once     Question Answer Comment   Select Supplement: Ensure Compact-Chocolate    Frequency Breakfast, Dinner        02/05/21 1522    02/05/21 0745  Diet Regular; Regular House  Diet effective now     Question Answer Comment   Diet Type Regular    Regular Regular House    RD to adjust diet per protocol?  Yes        02/05/21 0744    02/04/21 1255  Room Service  Once     Question:  Type of Service  Answer:  Room Service - Appropriate with Assistance    02/04/21 1254                  Active Medications  Scheduled Meds:  Current Facility-Administered Medications   Medication Dose Route Frequency Provider Last Rate    acetaminophen  650 mg Oral Q6H PRN Yordan Alexx Kokotek, DO      ALPRAZolam  0 5 mg Oral HS PRN Yordan Alexx Kokotek, DO      glycerin-hypromellose-  1 drop Both Eyes Q6H PRN Nelta Barbie Kokotek, DO      insulin lispro  1-6 Units Subcutaneous TID AC Ramon SCHOFIELD Kokotek, DO      insulin lispro  1-6 Units Subcutaneous HS Ramon SCHOFIELD [place], DO      levothyroxine  100 mcg Oral Early Morning Alvira Pump, DO      metoprolol succinate  150 mg Oral BID Pereyra Marissa, DO      multi-electrolyte  120 mL/hr Intravenous Continuous Pereyra Marissa, DO      multivitamin-minerals  1 tablet Oral Daily [facility], [place]      ondansetron  4 mg Oral Q6H PRN Fernanda Ana Viotto, DO      pravastatin  80 mg Oral Daily With [employer], DO      saccharomyces boulardii  250 mg Oral BID Anastasia Ren, DO       Continuous Infusions:  multi-electrolyte, 120 mL/hr      PRN Meds:   acetaminophen, 650 mg, Q6H PRN  ALPRAZolam, 0 5 mg, HS PRN  glycerin-hypromellose-, 1 drop, Q6H PRN  ondansetron, 4 mg, Q6H PRN        Isidore Dennis, DO      Portions of the record may have been created with voice recognition software  Occasional wrong word or "sound a like" substitutions may have occurred due to the inherent limitations of voice recognition software    Read the chart carefully and recognize, using context, where substitutions have occurred

## 2021-02-05 NOTE — ASSESSMENT & PLAN NOTE
[id number]  On coumadin  Follows with [facility] cardiology (Dr Rosalind Rutherford visit 8/11/2020)  · Continue home metoprolol 150 mg p o   BID  · Telemetry  · Start patient on 2 5 mg coumadin daily, and monitor INR  · Aware patient on antibiotics which may affect P450 metabolism  · INR 2 06 on 2/2, 2 29 on 2/3, 2 47 on 2/4, 2 59 on 2/5 continue current regimen

## 2021-02-05 NOTE — ASSESSMENT & PLAN NOTE
Lab Results   Component Value Date    HGBA1C 5 8 09/05/2019       Recent Labs     02/04/21  1643 02/04/21  2037 02/05/21  0744 02/05/21  1139   POCGLU 111 124 95 113       Blood Sugar Average: Last 72 hrs:  (P) 989 [id number]  Start patient on correction scale  Blood sugar has been within tolerable range

## 2021-02-05 NOTE — ASSESSMENT & PLAN NOTE
Asymptomatic  Urine culture positive for >100,000 E  Coli  Tenderness in lower abdomen on examination  Denies urinary symptoms  Patient currently on ciprofloxacin and metronidazole for presumed diverticulitis     · E coli sensitive to ciprofloxacin, which patient took for 3 days

## 2021-02-05 NOTE — CONSULTS
Consultation - Orthopedics   Rafy Stearns 80 y o  female MRN: [id number]  Unit/Bed#: [address] [place] [address] Encounter: [id number]      Assessment/Plan     Assessment:  Right knee severe osteoarthritis  Plan:  I did a thorough evaluation of Judy Ho and did look at the ultrasound and x-ray images of the right knee which demonstrates severe osteoarthritis particularly about the lateral and patellofemoral compartments however also involving the medial compartment  There is no obvious effusion on the knee x-rays however there is a small effusion noted that is likely physiologic  An aspiration of this small fluid amount would require ultrasound guidance  Her examination is not consistent with a septic right knee  She is able to do a straight leg raise and is also able to flex and extend the knee with little discomfort  There is no warmth or erythema or other outward signs of infection on examination of the knee  If anything, there is more pain down into the mid lower leg region to palpation although she does have pain with palpation of multiple regions throughout the lower extremity  I do not perceive any obvious infection markers at the right knee  I believe she can weightbear as tolerated  She does require pain management for the right knee severe arthritis  I do recommend physical therapy for the right knee  If she can not tolerate anti-inflammatory medications, that would be reasonable for treatment of this arthritic right knee pain  Once again, I do not believe this is a septic knee and feel that if there is an effusion, it is quite small and requires ultrasound-guided aspiration to get into this small pocket of fluid that is likely physiologic and is present on the ultrasound of the knee  I did discuss this with Dr Gabriel Mcdowell and relayed my findings and assessment and plan      History of Present Illness   Physician Requesting Consult: Laura Huerta DO  Reason for Consult / Principal Problem:  Right knee pain  HPI: Ashwin Vaughan is a 80y o  year old female who presents with right knee pain has been intermittent over the course of months to years  She had a recent flare up while here in the hospital although she is an for other medical problems  She does have a leukocytosis and has multiple possibilities as the cause this leukocytosis  Infectious Disease felt that the right knee required investigation for possible septic arthritis and thus the primary team requested through consultation that we evaluate the right knee to see if we agreed with Infectious Disease  Shana Rosario states that her pain is a mild and dull discomfort that is intermittent  It is worse with weight-bearing  And is better with rest   She denies any pain into the left knee  She notes that most of her pain is down into the lateral aspect of the right lower leg  She also gets some pain into her right foot  The pain in these regions is greater than the pain into the right knee  She does however state that she has pain all over her body  She remarks any pain into the right knee radiates laterally  Consults    Review of Systems   Constitutional: Positive for activity change  HENT: Negative  Eyes: Negative  Respiratory: Negative  Cardiovascular: Negative  Gastrointestinal: Positive for abdominal pain  Endocrine: Negative  Genitourinary: Positive for dysuria  Musculoskeletal: Positive for arthralgias and gait problem  Allergic/Immunologic: Negative  Hematological: Negative  Psychiatric/Behavioral: Negative          Historical Information   Past Medical History:   Diagnosis Date    Atrial fibrillation ([address] )     Basal cell carcinoma 1999    Hyperlipidemia     Hypothyroid     Kidney disease      Past Surgical History:   Procedure Laterality Date    CARDIAC PACEMAKER PLACEMENT      MOHS SURGERY  01/1998    shaving lesion, on face below left side of nose basal cell carcinoma     Social History   Social History     Substance and Sexual Activity   Alcohol Use Yes    Alcohol/week: 0 0 - 1 0 standard drinks     Social History     Substance and Sexual Activity   Drug Use No     E-Cigarette/Vaping    E-Cigarette Use Never User      E-Cigarette/Vaping Substances     Social History     Tobacco Use   Smoking Status Former Smoker    Quit date: 80    Years since quittin 1   Smokeless Tobacco Never Used     Family History:   Family History   Problem Relation Age of Onset    Alzheimer's disease Mother     Parkinsonism Mother         symptomatic    Macular degeneration Mother     Stroke Father     Vascular Disease Father     Heart disease Maternal Uncle         cardiac    COPD Son     Heart disease Maternal Uncle     Heart disease Maternal Uncle     Heart disease Maternal Uncle     Heart disease Maternal Uncle        Meds/Allergies   current meds:   Current Facility-Administered Medications   Medication Dose Route Frequency    acetaminophen (TYLENOL) tablet 650 mg  650 mg Oral Q6H PRN    ALPRAZolam (XANAX) tablet 0 5 mg  0 5 mg Oral HS PRN    glycerin-hypromellose- (ARTIFICIAL TEARS) ophthalmic solution 1 drop  1 drop Both Eyes Q6H PRN    insulin lispro (HumaLOG) 100 units/mL subcutaneous injection 1-6 Units  1-6 Units Subcutaneous TID AC    insulin lispro (HumaLOG) 100 units/mL subcutaneous injection 1-6 Units  1-6 Units Subcutaneous HS    levothyroxine tablet 100 mcg  100 mcg Oral Early Morning    metoprolol succinate (TOPROL-XL) 24 hr tablet 150 mg  150 mg Oral BID    multi-electrolyte (PLASMALYTE-A/ISOLYTE-S PH 7 4) IV solution  120 mL/hr Intravenous Continuous    multivitamin-minerals (CENTRUM) tablet 1 tablet  1 tablet Oral Daily    ondansetron (ZOFRAN-ODT) dispersible tablet 4 mg  4 mg Oral Q6H PRN    pravastatin (PRAVACHOL) tablet 80 mg  80 mg Oral Daily With Dinner    saccharomyces boulardii (FLORASTOR) capsule 250 mg  250 mg Oral BID     Allergies   Allergen Reactions    Other Other (See Comments)     Category: Adverse Reaction; Annotation - [id number]: HORSE SERUM - Pt states was used several years ago in tetanus boosters when she was a child  Objective   Vitals: Blood pressure 102/55, pulse 66, temperature 97 8 °F (36 6 °C), temperature source Oral, resp  rate 18, height 5' 8" (1 727 m), weight 79 8 kg (176 lb), SpO2 95 %  ,Body mass index is 26 76 kg/m²  Intake/Output Summary (Last 24 hours) at 2/5/2021 1848  Last data filed at 2/4/2021 2115  Gross per 24 hour   Intake 10 ml   Output --   Net 10 ml     I/O last 24 hours: In: 10 [I V :10]  Out: -     Invasive Devices     Peripheral Intravenous Line            Peripheral IV 02/04/21 Dorsal (posterior); Right Forearm 1 day                Physical Exam  Vitals signs reviewed  Constitutional:       Appearance: She is well-developed  HENT:      Head: Normocephalic and atraumatic  Right Ear: External ear normal       Left Ear: External ear normal    Eyes:      Conjunctiva/sclera: Conjunctivae normal    Neck:      Musculoskeletal: Normal range of motion and neck supple  Cardiovascular:      Rate and Rhythm: Normal rate  Pulmonary:      Effort: Pulmonary effort is normal  No respiratory distress  Musculoskeletal:      Right knee: She exhibits no effusion  Skin:     General: Skin is warm  Capillary Refill: Capillary refill takes less than 2 seconds  Neurological:      Mental Status: She is alert and oriented to person, place, and time  Psychiatric:         Behavior: Behavior normal        Right Knee Exam     Tenderness   The patient is experiencing tenderness in the lateral joint line  Range of Motion   Extension:  0 normal   Flexion:  90 abnormal     Tests   Varus: negative Valgus: negative    Other   Erythema: absent  Scars: absent  Sensation: normal  Pulse: present  Swelling: none  Effusion: no effusion present    Comments:  She has only minimal pain with range of motion about the right knee    She does have good stability on varus and valgus stress testing  She is able to perform a straight leg raise for me without difficulty or pain  There is no warmth or erythema to suggest infection  I do not perceive a visible or palpable difference between the right versus the left knee from a swelling or effusion standpoint  Her pain is over the lateral joint line on the right knee although that is significantly less than the pain over the right lower leg on the lateral aspect  It did not however perceive any erythema or swelling or abnormalities there on inspection or palpation  The right knee does not give the appearance of a septic joint  There is certainly no pain with micro motion about the right knee either  Lab Results:   CBC:   Lab Results   Component Value Date    WBC 25 57 (H) 02/05/2021    HGB 8 9 (L) 02/05/2021    HCT 28 3 (L) 02/05/2021    MCV 91 02/05/2021     02/05/2021    MCH 28 5 02/05/2021    MCHC 31 4 02/05/2021    RDW 14 3 02/05/2021    MPV 11 4 02/05/2021    NRBC 0 02/05/2021     Imaging Studies: I have personally reviewed pertinent films in PACS x-rays of the right knee taken today being AP and lateral views demonstrate severe arthritic change particularly in the lateral and patellofemoral compartments with moderate arthritic change into the medial compartment  These were nonweightbearing views and thus underestimate the true degree of arthritis  An ultrasound was also reviewed of the right knee demonstrating a small pocket of fluid that may be physiologic into the suprapatellar pouch  This is not an amount of fluid that I can aspirate without the help of ultrasound guidance  I do not perform ultrasound-guided aspirations and defer to Interventional Radiology to perform such a procedure if the primary team or Infectious Disease wish to trying get fluid  However, I do not believe that the examination or history or imaging studies demonstrate a septic joint    I do not feel that this joint is infected    EKG, Pathology, and Other Studies: I have personally reviewed pertinent films in PACS  VTE Prophylaxis: Sequential compression device Alan Dahl)     Code Status: Level 3 - DNAR and DNI  Advance Directive and Living Will:      Power of : Yes  POLST:

## 2021-02-05 NOTE — CONSULTS
Consultation - Cardiology   Rafy Stearns 80 y o  female MRN: [id number]  Unit/Bed#: [facility]01 Encounter: [id number]    Assessment/Plan     Assessment:  1  Pre-surgical screening  2  Metastatic vulvar cancer  3  Leukocytosis  4  Atrial fibrillation  5  Hypertension  6  Dyslipidemia  7  CAD with history of possible MI   8  Peripheral vascular disease  9  Permanent pacemaker for sick sinus syndrome  10  Diabetes      Plan:  Patient is currently on the service of [facility]  1  Will obtain 2D echocardiogram to evaluate cardiac function, structure wall motion  2  Patient's Coumadin is currently on hold and INR is 2 59  Will continue to hold Coumadin pending surgery, once INR is less than 2 would start IV heparin prior to surgery  3  Diabetic management per the primary team  4  Continue patient's beta-blocker and statin therapy  5  Will obtain [facility] Comment nuclear stress test on Monday 2/8/2021 to evaluate for ischemia  History of Present Illness   Physician Requesting Consult: Laura Huerta DO  Reason for Consult / Principal Problem:  Pre-surgical screening      HPI: Rafy Stearns is a 80y o  year old female who presented to the emergency room was admitted on 02/01/2021 with complaints of 1 month history of generalized weakness  She has been found to have metastatic vulvar cancer and tentatively will undergo surgical resection with gyn  We have been asked to evaluate patient for surgical screening  Patient follows with [facility] for history of atrial fibrillation, sick sinus syndrome for which she has a [employer] device, hypertension, dyslipidemia, diabetes, chronic kidney disease stage 4 and also peripheral arterial disease and has been noted to have greater than 75% right common femoral artery stenosis    It is also documented in the records that approximately 10 years ago she was told that she had a MI and at that time cardiac catheterization was recommended which she refused  On interview patient notes that she is fairly sedentary at home  To the former smoker quitting cigarettes in 1988 she has smoked at least 1 pack a day for over 30 years  Inpatient consult to Cardiology  Consult performed by: MARIE Hussein  Consult ordered by: Sina Bowden DO          Review of Systems   Constitutional: Positive for activity change  Negative for appetite change and fever  States she is very sedentary   HENT: Negative  Negative for congestion, ear discharge, mouth sores, rhinorrhea, sneezing and trouble swallowing  Eyes: Negative  Negative for photophobia and visual disturbance  Respiratory: Positive for shortness of breath  Negative for chest tightness  Cardiovascular: Negative  Negative for chest pain, palpitations and leg swelling  Gastrointestinal: Negative for abdominal distention, diarrhea, nausea and vomiting  Endocrine: Negative for polydipsia, polyphagia and polyuria  Genitourinary: Negative for difficulty urinating  Musculoskeletal: Positive for gait problem  Skin: Negative  Neurological: Negative for dizziness, speech difficulty and light-headedness  Hematological: Negative  Psychiatric/Behavioral: Negative          Historical Information   Past Medical History:   Diagnosis Date    Atrial [address] [address]     Basal cell carcinoma 1999    Hyperlipidemia     Hypothyroid     Kidney disease      Past Surgical History:   Procedure Laterality Date    CARDIAC PACEMAKER PLACEMENT      MOHS SURGERY  01/1998    shaving lesion, on face below left side of nose basal cell carcinoma     Social History     Substance and Sexual Activity   Alcohol Use Yes    Alcohol/week: 0 0 - 1 0 standard drinks     Social History     Substance and Sexual Activity   Drug Use No     E-Cigarette/Vaping    E-Cigarette Use Never User      E-Cigarette/Vaping Substances     Social History     Tobacco Use   Smoking Status Former Smoker    Quit date: 80    Years since quittin 1   Smokeless Tobacco Never Used     Family History:   Family History   Problem Relation Age of Onset    Alzheimer's disease Mother     Parkinsonism Mother         symptomatic    Macular degeneration Mother     Stroke Father     Vascular Disease Father     Heart disease Maternal Uncle         cardiac    COPD Son     Heart disease Maternal Uncle     Heart disease Maternal Uncle     Heart disease Maternal Uncle     Heart disease Maternal Uncle        Meds/Allergies   all current active meds have been reviewed, current meds:   Current Facility-Administered Medications   Medication Dose Route Frequency    acetaminophen (TYLENOL) tablet 650 mg  650 mg Oral Q6H PRN    ALPRAZolam (XANAX) tablet 0 5 mg  0 5 mg Oral HS PRN    glycerin-hypromellose- (ARTIFICIAL TEARS) ophthalmic solution 1 drop  1 drop Both Eyes Q6H PRN    insulin lispro (HumaLOG) 100 units/mL subcutaneous injection 1-6 Units  1-6 Units Subcutaneous TID AC    insulin lispro (HumaLOG) 100 units/mL subcutaneous injection 1-6 Units  1-6 Units Subcutaneous HS    levothyroxine tablet 100 mcg  100 mcg Oral Early Morning    metoprolol succinate (TOPROL-XL) 24 hr tablet 150 mg  150 mg Oral BID    multi-electrolyte (PLASMALYTE-A/ISOLYTE-S PH 7 4) IV solution  120 mL/hr Intravenous Continuous    multivitamin-minerals (CENTRUM) tablet 1 tablet  1 tablet Oral Daily    ondansetron (ZOFRAN-ODT) dispersible tablet 4 mg  4 mg Oral Q6H PRN    pravastatin (PRAVACHOL) tablet 80 mg  80 mg Oral Daily With Dinner    saccharomyces boulardii (FLORASTOR) capsule 250 mg  250 mg Oral BID    and PTA meds:   Prior to Admission Medications   Prescriptions Last Dose Informant Patient Reported? Taking?    ALPRAZolam (XANAX) 0 5 mg tablet Past Week at Unknown time Self No Yes   Sig: Take 1 tablet (0 5 mg total) by mouth daily at bedtime as needed for anxiety   Cinnamon 500 MG capsule 2021 at Unknown time Self Yes Yes Sig: Take 500 mg by mouth   Copper Gluconate (COPPER CAPS PO) Past Week at Unknown time Self Yes Yes   Sig: Take by mouth   DIOVAN 320 MG tablet 1/31/2021 at Unknown time Self Yes Yes   Glucosamine-Chondroitin (GLUCOSAMINE CHONDR COMPLEX PO) 1/31/2021 at Unknown time Self Yes Yes   Sig: Take 2 tablets by mouth   Glucosamine-MSM-Hyaluronic Acd (JOINT HEALTH PO) 1/31/2021 at Unknown time Self Yes Yes   Sig: Take 2 tablets by mouth   Magnesium 250 MG TABS Past Week at Unknown time Self Yes Yes   Sig: Take 1 tablet by mouth daily   Multiple Vitamins-Minerals (CENTRUM ULTRA WOMENS) TABS 1/31/2021 at Unknown time Self Yes Yes   Sig: Take 1 tablet by mouth   Multiple Vitamins-Minerals (PRESERVISION AREDS 2+MULTI VIT PO) 1/31/2021 at Unknown time Self Yes Yes   Sig: Take 2 tablets by mouth   VITAMIN A PO Not Taking at Unknown time Self Yes No   Sig: Take 1 tablet by mouth   VITAMIN D PO 1/31/2021 at Unknown time Self Yes Yes   Sig: Take by mouth   Zinc Sulfate (ZINC 15 PO) 1/31/2021 at Unknown time Self Yes Yes   Sig: Take by mouth   levothyroxine 112 mcg tablet 1/31/2021 at Unknown time  No Yes   Sig: TAKE 1 TABLET BY MOUTH  DAILY   metoprolol succinate (TOPROL-XL) 50 mg 24 hr tablet 2/1/2021 at Unknown time Self Yes Yes   Sig: Take 50 mg by mouth 3 tablets in the am and 3 tablets in the pm   potassium chloride (KCl) 2 mEq/mL SOLN 1/31/2021 at Unknown time Self Yes Yes   Sig: Pt taking half tablet daily   simvastatin (ZOCOR) 40 mg tablet 1/31/2021 at Unknown time Self Yes Yes   Sig: Take 40 mg by mouth   warfarin (COUMADIN) 5 mg tablet 1/31/2021 at Unknown time Self Yes Yes   Sig: Take 1/2 to 1 tablet daily or as directed  Facility-Administered Medications: None     Allergies   Allergen Reactions    Other Other (See Comments)     Category: Adverse Reaction; Annotation - [id number]: HORSE SERUM - Pt states was used several years ago in tetanus boosters when she was a child         Objective   Vitals: Blood pressure 102/55, pulse 66, temperature 97 8 °F (36 6 °C), temperature source Oral, resp  rate 18, height 5' 8" (1 727 m), weight 79 8 kg (176 lb), SpO2 95 %  Orthostatic Blood Pressures      Most Recent Value   Blood Pressure  102/55 filed at 02/05/2021 1525   Patient Position - Orthostatic VS  Lying filed at 02/05/2021 1525            Intake/Output Summary (Last 24 hours) at 2/5/2021 1543  Last data filed at 2/4/2021 2115  Gross per 24 hour   Intake 10 ml   Output --   Net 10 ml       Invasive Devices     Peripheral Intravenous Line            Peripheral IV 02/04/21 Dorsal (posterior); Right Forearm less than 1 day                Physical Exam  Vitals signs and nursing note reviewed  Constitutional:       Appearance: Normal appearance  She is obese  HENT:      Right Ear: External ear normal       Left Ear: External ear normal       Nose: Nose normal    Eyes:      General: No scleral icterus  Right eye: No discharge  Left eye: No discharge  Conjunctiva/sclera: Conjunctivae normal       Pupils: Pupils are equal, round, and reactive to light  Neck:      Musculoskeletal: Normal range of motion and neck supple  Cardiovascular:      Rate and Rhythm: Normal rate and regular rhythm  Pulses: Normal pulses  Heart sounds: Murmur present  Systolic murmur present with a grade of 3/6  Pulmonary:      Effort: Pulmonary effort is normal  No respiratory distress  Breath sounds: Normal breath sounds  No wheezing, rhonchi or rales  Abdominal:      General: Bowel sounds are normal  There is no distension  Palpations: Abdomen is soft  Skin:     General: Skin is warm and dry  Capillary Refill: Capillary refill takes less than 2 seconds  Neurological:      General: No focal deficit present  Mental Status: She is alert  Mental status is at baseline  Psychiatric:         Attention and Perception: Attention normal          Mood and Affect: Affect is blunt           Speech: Speech normal          Behavior: Behavior is cooperative  Lab Results:   I have personally reviewed pertinent lab results  CBC with diff:   Results from last 7 days   Lab Units 21  0609   WBC Thousand/uL 25 57*   RBC Million/uL 3 12*   HEMOGLOBIN g/dL 8 9*   HEMATOCRIT % 28 3*   MCV fL 91   MCH pg 28 5   MCHC g/dL 31 4   RDW % 14 3   MPV fL 11 4   PLATELETS Thousands/uL 296     CMP:   Results from last 7 days   Lab Units 21  0609   SODIUM mmol/L 131*   POTASSIUM mmol/L 4 2   CHLORIDE mmol/L 100   CO2 mmol/L 24   BUN mg/dL 46*   CREATININE mg/dL 2 23*   CALCIUM mg/dL 9 4   AST U/L 27   ALT U/L 15   ALK PHOS U/L 82   EGFR ml/min/1 73sq m 20     Troponin:   0   Lab Value Date/Time    TROPONINI 0 04 2021 1124     BNP:   Results from last 7 days   Lab Units 21  0609   POTASSIUM mmol/L 4 2   CHLORIDE mmol/L 100   CO2 mmol/L 24   BUN mg/dL 46*   CREATININE mg/dL 2 23*   CALCIUM mg/dL 9 4   EGFR ml/min/1 73sq m 20     Coags:   Results from last 7 days   Lab Units 21  0609  21  1130   PTT seconds  --   --  33   INR  2 59*   < > 1 73*    < > = values in this interval not displayed  TSH:   Results from last 7 days   Lab Units 21  1124   TSH 3RD GENERATON uIU/mL 0 322*     Magnesium:   Results from last 7 days   Lab Units 21  0609   MAGNESIUM mg/dL 2 0     Imaging: I have personally reviewed pertinent reports  EK lead EKG demonstrates atrial fibrillation with right bundle-branch block, there are no previous EKGs for comparison    VTE Prophylaxis: Sequential compression device Latasha Siad)     Code Status: Level 3 - DNAR and DNI  Advance Directive and Living Will:      Power of : Yes  POLST:      Jabari Han, [facility]   Cardiology

## 2021-02-05 NOTE — PROGRESS NOTES
Progress Note - Infectious Disease   Itzel Schuler 80 y o  female MRN: [id number]  Unit/Bed#: [address] [place] [address] Encounter: [id number]    Impression:  · Leukocytosis:  Likely due to vulvar mass  Patient is afebrile and hemodynamically stable  Of note leukocytosis has persisted despite treatment with ciprofloxacin and metronidazole for presumed diverticulitis  · Possible acute diverticulitis as CT demonstrated segmental perisigmoid fat stranding  Appears to be clinically improving other than leukocytosis  She has tolerated diet advancement  · Vular mass:  Recent biopsy demonstrated vulvar intraepithelial neoplasia  This may be the cause of her leukocytosis  · RT knee pain: pt has continued knee tenderness, swelling, limited ROM and difficulty bearing weight  ESR, Crp are elevated  US knee demonstrates joint effusion  R/o septic arthritis  · Asymptomatic bacteruria  · KIKE on CKD stg 3:  Creatinine is improving towards baseline  · Advanced age     RECOMMENDATIONS:   · Suggest arthrocentesis: please send cell count, bacterial cultures, crystal analysis  · Diagnosis of acute diverticulitis is dubious at this point as patient denies abdominal pain at present and has tolerated diet advancement  Ciprofloxacin and metronidazole were discontinued yesterday, 2/4  · Gyn-Onc is following with consideration for palliative surgery  · Monitor clinical response, temperature curve, WBC count  My recommendations were securely texted to primary attg, Dr Madison Su  Thank you for allowing me to participate in the care of this patient  The ID service will follow      Antibiotics: none  Scheduled Meds:  Current Facility-Administered Medications   Medication Dose Route Frequency Provider Last Rate    acetaminophen  650 mg Oral Q6H PRN Ruther Bound [place], DO      ALPRAZolam  0 5 mg Oral HS PRN Ruther Bound Kok[place]ek, DO      glycerin-hypromellose-  1 drop Both Eyes Q6H PRN Ruther Bound Kokotek, DO      insulin lispro  1-6 Units Subcutaneous TID IVETTE Horne, DO      insulin lispro  1-6 Units Subcutaneous HS Brad Dakin [place], DO      levothyroxine  100 mcg Oral Early Morning Stephen Hernandez, DO      metoprolol succinate  150 mg Oral BID Eduar Rachelle, DO      multi-electrolyte  120 mL/hr Intravenous Continuous Brad Dakin [place], DO      multivitamin-minerals  1 tablet Oral Daily Brad Dakin [place], [place]      ondansetron  4 mg Oral Q6H PRN Stephen Hernandez, DO      pravastatin  80 mg Oral Daily With [employer], DO      saccharomyces boulardii  250 mg Oral BID Anastasia Ren, DO       Continuous Infusions:multi-electrolyte, 120 mL/hr      PRN Meds:   acetaminophen    ALPRAZolam    glycerin-hypromellose-    ondansetron    Subjective:  Patient reports continued RT knee pain and swelling  She is unable to bear weight on her RT leg, which limited her PT session today  She denies fever, vomiting, diarrhea      Objective:  Vitals:  Temp:  [97 °F (36 1 °C)-99 °F (37 2 °C)] 97 8 °F (36 6 °C)  HR:  [63-71] 66  Resp:  [18] 18  BP: ()/(50-60) 102/55  SpO2:  [94 %-96 %] 95 %  Temp (24hrs), Av 1 °F (36 7 °C), Min:97 °F (36 1 °C), Max:99 °F (37 2 °C)  Current: Temperature: 97 8 °F (36 6 °C)  Physical Exam:   General Appearance:  Alert, awake, nontoxic, no acute distress   ENT: Oropharynx moist without lesions   Lungs:   Clear to auscultation bilaterally; respirations unlabored   Heart:  S1, S2, RRR, no murmur   Abdomen:   Soft, non-tender, non-distended   : No Lopez catheter present   Extremities: RT knee warmth, edema, limited ROM in flexion, tender to palpation   Neurologic: AAO to person, surroundings, conversant, fluent speech   Skin: No rash     Labs, Imaging, & Other studies:   All pertinent labs and imaging studies were personally reviewed  Results from last 7 days   Lab Units 21  0609 21  0509 21  0546   WBC Thousand/uL 25 57* 25 50* 24 56*   HEMOGLOBIN g/dL 8 9* 9 7* 10 0*   PLATELETS Thousands/uL 296 276 271     Results from last 7 days   Lab Units 02/05/21  0609 02/04/21  0509 02/03/21  0546   SODIUM mmol/L 131* 134* 134*   POTASSIUM mmol/L 4 2 4 0 4 2   CHLORIDE mmol/L 100 102 103   CO2 mmol/L 24 25 24   BUN mg/dL 46* 21 17   CREATININE mg/dL 2 23* 1 39* 1 25   EGFR ml/min/1 73sq m 20 35 39   CALCIUM mg/dL 9 4 9 6 9 4   AST U/L 27 19 14   ALT U/L 15 14 14   ALK PHOS U/L 82 92 94     Results from last 7 days   Lab Units 02/01/21  1441 02/01/21  1415   BLOOD CULTURE   --  No Growth at 72 hrs  No Growth at 72 hrs  URINE CULTURE  >100,000 cfu/ml Escherichia coli*  >100,000 cfu/ml   --      Results from last 7 days   Lab Units 02/05/21  0609   PROCALCITONIN ng/ml 1 26*     Results from last 7 days   Lab Units 02/05/21  0609   CRP mg/L 165 5*     2/5 RT knee US: Small-to-moderate right knee joint effusion

## 2021-02-05 NOTE — ASSESSMENT & PLAN NOTE
Warms to palpation of lateral aspect and tender with repositioning  US ordered - small to moderate effusion found  ID consulted - help appreciated  Arthrocentesis planned, contact orthopedics (help appreciated)

## 2021-02-05 NOTE — ASSESSMENT & PLAN NOTE
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  -- Confirmed by positive p53 and negative p16/Ki-67 (increased proliferative index) immunostains  -- Associated spongiosis and inflammation (mostly neutrophils and eosinophils); special stain for fungus       (PAS) negative  - No definitive invasive carcinoma identified  Note: Although no definitive invasive carcinoma is identified, the biopsy is superficial and may not be representative of the entire lesion  Patient has (had) appointment scheduled with Dr Lesly Rodriguez (gynecology Oncology) on 2/4  Patient is reluctant to further workup for pulmonary nodule this time    Monitor leukocytosis and symptoms in determining disposition     Gyne-Onc (help appreciated)  · Plan for surgery Tuesday 8:15 a m  · NPO after midnight 2/8 to 2/9  · If Monday INR still >2 give small dose vit K  · Procal elevated at 1 26

## 2021-02-05 NOTE — OCCUPATIONAL THERAPY NOTE
OT EVALUATION       02/05/21 6161   Note Type   Note type Evaluation   Restrictions/Precautions   Other Precautions Fall Risk;Pain; Chair Alarm; Bed Alarm   Pain Assessment   Pain Assessment Tool Cohen-Baker FACES   Cohen-Baker FACES Pain Rating 6   Pain Location/Orientation   (L wrist, R knee, B heels)   Home Living   Type of Home House   Home Layout Two level; Able to live on main level with bedroom/bathroom   Bathroom Shower/Tub Tub/shower unit   Bathroom Equipment Shower chair   Home Equipment Walker;Cane   Prior Function   Level of [place]   (uses cane, assist with ADLS over past month/fatigue)   Lives With Son   Receives Help From Family   ADL Assistance Needs assistance   IADLs Needs assistance   ADL   Eating Assistance 4  Minimal Assistance   Grooming Assistance 4  Minimal Assistance   UB Bathing Assistance 3  Moderate Assistance   LB [address] 2  Maximal Assistance   [address] 3  Moderate Assistance   LB Dressing Assistance 2  Maximal [address]  1 Total assistance, BM on bed pan, dependent for perineal hygiene   Bed Mobility   Rolling R 2  Maximal assistance   Rolling L 2  Maximal assistance   Supine to Sit 2  Maximal assistance   Sit to Supine 3  Moderate assistance   Transfers   Sit to Stand Unable to assess  (due to weakness and pain )   Balance   Static Sitting Fair   Dynamic Sitting Fair -   Activity Tolerance   Activity Tolerance Patient limited by fatigue;Patient limited by pain   RUE Assessment   RUE Assessment WFL  (grossly 3+/5 MMT)   LUE Overall AROM   L Shoulder Flexion AAROM to 90 degrees, MMT 2+/5   L Elbow Flexion limited flexion to 60 degrees, MMT 2/5   L Mass Grasp edema in hand/fingers, decreased flexion,  2/5   Edema   LUE Edema   (L hand edema)   Cognition   Overall Cognitive Status WFL   Arousal/Participation Cooperative   Attention Attends with cues to redirect   Orientation Level Oriented to person;Oriented to place   Following Commands Follows one step commands with increased time or repetition   Comments pt is slow to respond at times, cooperative with increased encouragement    Assessment   Limitation Decreased ADL status; Decreased UE ROM; Decreased UE strength;Decreased Safe judgement during ADL;Decreased endurance;Decreased high-level ADLs; Decreased self-care trans  (decreased balance and mobility )   Prognosis Good   Assessment Patient evaluated by Occupational Therapy  Patient admitted with Generalized muscle weakness  The patients occupational profile, medical and therapy history includes a extensive additional review of physical, cognitive, or psychosocial history related to current functional performance  Comorbidities affecting functional mobility and ADLS include: afib and cancer  Prior to admission, patient was independent with functional mobility with cane, requiring assist for ADLS and requiring assist for IADLS  The evaluation identifies the following performance deficits: weakness, decreased ROM, impaired balance, decreased endurance, increased fall risk, new onset of impairment of functional mobility, decreased ADLS, decreased IADLS, pain, decreased activity tolerance, decreased safety awareness, impaired judgement and decreased strength, that result in activity limitations and/or participation restrictions  This evaluation requires clinical decision making of high complexity, because the patient presents with comorbidites that affect occupational performance and required significant modification of tasks or assistance with consideration of multiple treatment options  The Barthel Index was used as a functional outcome tool presenting with a score of 20, indicating marked limitations of functional mobility and ADLS  The patient's raw score on the -PAC Daily Activity inpatient short form is 12, standardized score is 30 6, less than 39 4   Patients at this level are likely to benefit from DC to post-acute rehabilitation services  Please refer to the recommendation of the Occupational Therapist for safe DC planning  Patient will benefit from skilled Occupational Therapy services to address above deficits and facilitate a safe return to prior level of function  Goals   Patient Goals to get stronger    STG Time Frame   (1-7 days)   Short Term Goal  Goals established to promote patient goal of getting stronger:  Patient will increase standing tolerance to 1 minutes during ADL task to decrease assistance level and decrease fall risk; Patient will increase bed mobility to mod assist in preparation for ADLS and transfers; Patient will increase functional mobility to and from bathroom with rolling walker with mod assist of 2 to increase performance with ADLS and to use a toilet; Patient will tolerate 10 minutes of UE ROM/strengthening to increase general activity tolerance and performance in ADLS/IADLS; Patient will improve functional activity tolerance to 10 minutes of sustained functional tasks to increase participation in basic self-care and decrease assistance level;  Patient will be able to to verbalize understanding and perform energy conservation/proper body mechanics during ADLS and functional mobility at least 75% of the time with minimal cueing to decrease signs of fatigue and increase stamina to return to prior level of function; Patient will increase dynamic sitting balance to fair to improve the ability to sit at edge of bed or on a chair for ADLS;  Patient will increase dynamic standing balance to poor+ to improve postural stability and decrease fall risk during standing ADLS and transfers       LTG Time Frame   (8-14 days)   Long Term Goal Goals established to promote patient goal of getting stronger:  Patient will increase standing tolerance to 2 minutes during ADL task to decrease assistance level and decrease fall risk; Patient will increase bed mobility to min assist in preparation for ADLS and transfers; Patient will increase functional mobility to and from bathroom with rolling walker with mod assist to increase performance with ADLS and to use a toilet; Patient will tolerate 20 minutes of UE ROM/strengthening to increase general activity tolerance and performance in ADLS/IADLS; Patient will improve functional activity tolerance to 20 minutes of sustained functional tasks to increase participation in basic self-care and decrease assistance level;  Patient will be able to to verbalize understanding and perform energy conservation/proper body mechanics during ADLS and functional mobility at least 90% of the time without cueing to decrease signs of fatigue and increase stamina to return to prior level of function; Patient will increase dynamic sitting balance to fair+ to improve the ability to sit at edge of bed or on a chair for ADLS;  Patient will increase dynamic standing balance to fair- to improve postural stability and decrease fall risk during standing ADLS and transfers  Functional Transfer Goals   Pt Will Perform All Functional Transfers   (STG max assist of 2 LTG max assist )   ADL Goals   Pt Will Perform Eating   (STG supervision LTG Independent )   Pt Will Perform Grooming   (STG supervision LTG Independent )   Pt Will Perform Bathing   (STG mod assist LTG min assist )   Pt Will Perform UE Dressing   (STG min assist LTG supervision )   Pt Will Perform LE Dressing   (STG mod assist LTG min assist )   Pt Will Perform Toileting   (STG mod assist LTG min assist )   Plan   Treatment Interventions ADL retraining;Functional transfer training;UE strengthening/ROM; Endurance training;Patient/family training;Equipment evaluation/education; Activityengagement; Compensatory technique education   Goal Expiration Date 02/19/21   OT Frequency 3-5x/wk   Recommendation   OT Discharge Recommendation Post-Acute Rehabilitation Services   AM-PAC Daily Activity Inpatient   Lower Body Dressing 1   Bathing 2   Toileting 1   Upper Body Dressing 2   Grooming 3   Eating 3   Daily Activity Raw Score 12   Daily Activity Standardized Score (Calc for Raw Score >=11) 30 6   AM-PAC Applied Cognition Inpatient   Following a Speech/Presentation 3   Understanding Ordinary Conversation 4   Taking Medications 3   Remembering Where Things Are Placed or Put Away 3   Remembering List of 4-5 Errands 3   Taking Care of Complicated Tasks 3   Applied Cognition Raw Score 19   Applied Cognition Standardized Score 39 77   Barthel Index   Feeding 5   Bathing 0   Grooming Score 0   Dressing Score 0   Bladder Score 5   Bowels Score 5   Toilet Use Score 0   Transfers (Bed/Chair) Score 5   Mobility (Level Surface) Score 0   Stairs Score 0   Barthel Index Score 20   Licensure   [place] License Number  Teo [address]ui [address] [id number]/L [id number]

## 2021-02-05 NOTE — ASSESSMENT & PLAN NOTE
Lab Results   Component Value Date    EGFR 20 02/05/2021    EGFR 35 02/04/2021    EGFR 39 02/03/2021    CREATININE 2 23 (H) 02/05/2021    CREATININE 1 39 (H) 02/04/2021    CREATININE 1 25 02/03/2021   · Acute on chronic injury  · Continue to monitor renal function  · Plasmalyte 120ml/hr  · Correct electrolyte abnormalities  · Hold Diovan/losartan, consider restarting tomorrow if renal function continues to improve

## 2021-02-06 PROBLEM — I75.029 BLUE TOE SYNDROME (HCC): Status: ACTIVE | Noted: 2021-01-01

## 2021-02-06 NOTE — PHYSICAL THERAPY NOTE
02/06/21 1327   PT Last Visit   PT Visit Date 02/06/21   Note Type   Note Type Treatment   Cancel Reasons Other;pt reports "tired"  (pt refused) Attempted to see pt for PT treatment session - pt initially agreeable to LE exercise   PT uncovered pt's LEs to initiate LE ex and pt stated, "not now, I'm just too tired"   Licensure   [employer] System Number  [id number] [place] [address] [id number]

## 2021-02-06 NOTE — ASSESSMENT & PLAN NOTE
Lab Results   Component Value Date    HGBA1C 5 8 09/05/2019       Recent Labs     02/05/21  1139 02/05/21  1631 02/05/21  2102 02/06/21  0833   POCGLU 113 124 113 125       Blood Sugar Average: Last 72 hrs:  (P) 122

## 2021-02-06 NOTE — ASSESSMENT & PLAN NOTE
[id number]  On coumadin  Follows with [facility] cardiology (Dr David Lora visit 8/11/2020)  · Continue home metoprolol 150 mg p o   BID  · Telemetry  · Start patient on 2 5 mg coumadin daily, and monitor INR  · Aware patient on antibiotics which may affect P450 metabolism  · INR 2 06 on 2/2, 2 29 on 2/3, 2 47 on 2/4, 2 59 on 2/5  · Holding warfarin   · Start heparin drip

## 2021-02-06 NOTE — ASSESSMENT & PLAN NOTE
Lab Results   Component Value Date    EGFR 13 02/08/2021    EGFR 13 02/07/2021    EGFR 17 02/06/2021    CREATININE 3 22 (H) 02/08/2021    CREATININE 3 10 (H) 02/07/2021    CREATININE 2 49 (H) 02/06/2021   · Acute on chronic injury - Worsening KIKE  · Continue to monitor renal function  · Plasmalyte 100ml/hr  · Correct electrolyte abnormalities  · Hold Diovan/losartan, consider restarting tomorrow if renal function continues to improve  · Bladder scan, retention protocol, I/Os, daily weights  · Urine Na pending  · Consider Nephrology consult

## 2021-02-06 NOTE — ASSESSMENT & PLAN NOTE
First digits of bilateral feet  Cause of significant discomfort to the patient  - sensitivity of left great toe reported today  - continue to monitor for infection  - will start foot soaks and steroid therapy with topical triamcinolone  - podiatry consulted

## 2021-02-06 NOTE — QUICK NOTE
RN paged me the patient is requesting to speak with me  Patient reports she has pain in 1st digits of bilateral feet  Physical exam  On exam, all toenails of bilateral feet have yellow discoloration, dry flaking skin  Notably the 1st digits of bilateral feet have toenails that extend into the skin of lateral and medial nail fold, indicating ingrown toenails  Patient states these have been present for long duration and she has not received any intervention and does not see a podiatrist   Bilateral 1st digits are tender to palpation along the nail bed  No erythema or warmth is noted along the nail bed bilaterally  Pulp of bilateral great toes are soft to palpation without turgor  It is also noted that there is a 1 cm well-circumscribed ecchymotic lesion on the tip of the 1st digit of the right foot which is tender to palpation  Similar smaller lesion of 0 5 cm is noted on 1st digit of left foot  Unable to palpate dorsalis pedis and posterior tibial pulses in bilateral feet  Able to find Doppler flow in dorsalis pedis pulse of right foot but not in dorsalis pedis pulse of left foot and bilateral posterior tibial pulses  Assessment and plan  · Patient has severe onychomycosis of all toenails bilaterally and extensive ingrowth of toenails into the nail fold of bilateral great toes  No paronychia or feeling is noted at this time  The ingrown nails are causing significant discomfort for the patient  Will start foot soaks followed by triamcinolone 0 1 cream application for relief  · The tender ecchymotic lesions noted on bilateral great toes can be cholesterol emboli vs ischemic insult vs purple toe syndrome associated with warfarin therapy- patient is unable to tell me how long these have existed  Patient is anticoagulated this time with supratherapeutic INR  Continue pravastatin 80 mg daily for PAD

## 2021-02-06 NOTE — PROGRESS NOTES
[address] [facility] 65 [address] 82 [facility] Practice Progress Note - Itzel Schuler 80 y o  female MRN: [id number]    Unit/Bed#: [address] [place] [address]- Encounter: [id number]      Assessment/Plan:  Blue toe syndrome [facility])  Assessment & Plan    1 cm well-circumscribed ecchymotic lesion was noted on the tip of the 1st digit of the right foot, tender to palpation  · Patient is unclear how long this has been there  · With history of peripheral arterial disease, this could be a cholesterol emboli vs ischemic insult vs purple toe syndrome associated with warfarin therapy  · Doppler flow noted in dorsalis pedis pulse of right foot  Doppler flow not noted in posterior tibial pulse of right foot, dorsalis pedis pulse of left foot  Posterior tibial pulse of left foot was unable to be examined due to patient's positioning  · Continue pravastatin 80 mg daily  · Podiatry consult, recs appreciated    Right knee pain  Assessment & Plan  Warms to palpation of lateral aspect and tender with repositioning  US ordered - small to moderate effusion found  ID consulted - leukocytosis like 2/2 malignancy  Asymptomatic bacteruria  R knee US to assess effusion, if present consider arthrocentesis  Diverticulitis unlikely since no abd pain and tolerating diet so consider d/c antibiotics  2/5 suggest arthro right knee with cell count, bacterial cultures, crystal analysis  Discontinue Cipro and Flagyl  Ortho consult: Severe osteoarthritis of right knee  No obvious effusion but small, likely physiologic  Aspiration require US guidance  Not consistent with septic knee  Will discomfort, no warmth, no erythema, no signs of infection  Pain management, PT for right knee       Left wrist pain  Assessment & Plan  Experiencing 2 day history of left wrist and associated swelling, patient unable to fully describe trauma (may have been caused during assisted transfer)  · Patient has left antecubital peripheral line proximal to edema -> asked nurse to switched to other arm  · Tenderness appears to be in soft tissues  · X-ray read pending  · Patient refusing ice  · Tylenol 650 PO PRN    UTI (urinary tract infection)  Assessment & Plan  Asymptomatic  Urine culture positive for >100,000 E  Coli  Tenderness in lower abdomen on examination  Denies urinary symptoms  Patient currently on ciprofloxacin and metronidazole for presumed diverticulitis  · E coli sensitive to ciprofloxacin, which patient took for 3 days    Anemia  Assessment & Plan  11 on 2/1 -> 9 2 on 2/2-> 10 on 2/3-> 10 0 2/4-> 8 9 on 2/5  Continue to monitor      Diverticulosis with fat stranding and leukocytosis  Assessment & Plan  CT: Diverticulosis coli  Possible segmental perisigmoid fat stranding may be due to acute diverticulitis, although evaluation is limited  · Surgical soft diet  · LR discontinued  · Continue Cipro 400mg p o  q12h and metronidazole 500mg p o  q8H  · Trend vitals, WBCs, and exam findings/symptoms  · Leukocytisis fluctuating 23 on 2/1 to 18 on 2/2 24 5 on 2/3, 25 5 on 2/4  · Appears to change in sync with hgb and platelets  · Patient tolerated diet advancement well  · ID recommended discontinuation of antibiotics    Pulmonary nodule  Assessment & Plan  Potential biopsy  · IR consulted discontinued  · Patient expresses she does not wish to undergo biopsy this  · She is aware she cannot have biopsy concomitantly with vulvar mass surgery  · Oncology on board    History of sick sinus syndrome  Assessment & Plan  Pace maker in place    Vulvar intraepithelial neoplasia, differentiated-type (dVIN)  Assessment & Plan  Patient recently seen in Dr Erica Viramontes office on 01/11/2021 and biopsy was taken  Results revealed vulvar intraepithelial neoplasia on 01/19/2021  Biopsy results shown below    Vulvar intraepithelial neoplasia, differentiated (simplex) type (dVIN), 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  -- Confirmed by positive p53 and negative p16/Ki-67 (increased proliferative index) immunostains      -- Associated spongiosis and inflammation (mostly neutrophils and eosinophils); special stain for fungus       (PAS) negative  - No definitive invasive carcinoma identified  Note: Although no definitive invasive carcinoma is identified, the biopsy is superficial and may not be representative of the entire lesion  Patient has (had) appointment scheduled with Dr Quique Lo (gynecology Oncology) on 2/4  Patient is reluctant to further workup for pulmonary nodule this time    Monitor leukocytosis and symptoms in determining disposition     Gyne-Onc (help appreciated)  · Plan for surgery Tuesday 8:15 a m  · NPO after midnight 2/8 to 2/9  · If Monday INR still >2 give small dose vit K  · Procal elevated at 1 26    Acute on chronic kidney failure [facility])  Assessment & Plan  Lab Results   Component Value Date    EGFR 17 02/06/2021    EGFR 20 02/05/2021    EGFR 35 02/04/2021    CREATININE 2 49 (H) 02/06/2021    CREATININE 2 23 (H) 02/05/2021    CREATININE 1 39 (H) 02/04/2021   · Acute on chronic injury  · Continue to monitor renal function  · Plasmalyte 120ml/hr  · Correct electrolyte abnormalities  · Hold Diovan/losartan, consider restarting tomorrow if renal function continues to improve    Type 2 diabetes mellitus with diabetic peripheral angiopathy without gangrene [facility])  Assessment & Plan  Lab Results   Component Value Date    HGBA1C 5 8 09/05/2019       Recent Labs     02/05/21  1139 02/05/21  1631 02/05/21  2102 02/06/21  0833   POCGLU 113 124 113 125       Blood Sugar Average: Last 72 hrs:  (P) 122    Type 2 diabetes mellitus with diabetic chronic kidney disease [facility])  Assessment & Plan  Lab Results   Component Value Date    HGBA1C 5 8 09/05/2019       Recent Labs     02/05/21  1139 02/05/21  1631 02/05/21  2102 02/06/21  0833   POCGLU 113 124 113 125       Blood Sugar Average: Last 72 hrs:  (P) 122  Start patient on correction scale  Blood sugar has been within tolerable range    Macular degeneration  Assessment & Plan  Patient reports taking OTC eye drops-> given artificial tears PRN  · Preservision not available in pharmacy  · Reports receiving a medication from her retinal specialist every 2 weeks    Ingrown toenail  Assessment & Plan  First digits of bilateral feet  Cause of significant discomfort to the patient  - sensitivity of left great toe reported today  - continue to monitor for infection  - will start foot soaks and steroid therapy with topical triamcinolone  - podiatry consulted  Acquired hypothyroidism  Assessment & Plan  Home levothyroxine 112mcg, consider change in dose  · TSH low at 0 322, dose reduced to 100mcg daily      Paroxysmal atrial fibrillation [facility])  Assessment & Plan  [id number]  On coumadin  Follows with [facility] cardiology (Dr Ly Jaramillo visit 8/11/2020)  · Continue home metoprolol 150 mg p o  BID  · Telemetry  · Start patient on 2 5 mg coumadin daily, and monitor INR  · Aware patient on antibiotics which may affect P450 metabolism  · INR 2 06 on 2/2, 2 29 on 2/3, 2 47 on 2/4, 2 59 on 2/5 continue current regimen    * Generalized muscle weakness and persistent leukocytosis  Assessment & Plan  Patient presents with new onset weakness with inability to rise from toilet with 1 month of fatigue, reduced appetite, and weight loss  Etiology unknown  In context of patient with newly diagnosed vulvar intraepithelial neoplasia, has history of tobacco use, history of diverticulosis, uncontrolled diabetes, and arrhythmia    · CT chest, abdomen, pelvis:  Right-sided pulmonary nodule 2x1 3cm and enlarged arnulfo carinal lymph, Sigmoid fat stranding and enlarged left inguinal lymph node  · Abnormal UA (follow up urine culture)  · WBC: 24 56-> 25 5,-> 25 57 (persistent leukocytosis)  · Normoglycemia reduces suspicion for infection  · Discontinue Abx as per ID (help appreciated)  · Infectious disease  · CRP, ESR both elevated  · Fall precautions  · Follow up blood NGTD and Urine cultures positive for E coli (sensitive to ciprofloxacin which patient was on for 4 days) and patient had o symptoms  · Consult Oncology - help appreciated  · Flow cytometry, monitor CBC for WBCs and anemia  · Left shift and other cell lines elevated  Leukocytosis reactive but possibility of primary bone marrow disorder  flow cytometry on peripheral blood  Continue to monitor the WBC trend  Anemia workup can be later  Vulvar mass per gyn onc  Pulm nodule- concern for malignancy, IR consulted  · Gyn-Onc  · See recommendations in Vulvar intraepithelial Neoplasia            Subjective:   Patient seen at bedside  Denies chest pain abdominal pain nausea vomiting diarrhea shortness of breath  Objective:     Vitals: Blood pressure 110/54, pulse 66, temperature 98 1 °F (36 7 °C), temperature source Oral, resp  rate 19, height 5' 8" (1 727 m), weight 79 8 kg (176 lb), SpO2 94 %  ,Body mass index is 26 76 kg/m²    Wt Readings from Last 3 Encounters:   02/01/21 79 8 kg (176 lb)   01/11/21 81 6 kg (180 lb)   11/30/20 84 4 kg (186 lb)     No intake or output data in the 24 hours ending 02/06/21 1440    Physical Exam: /54   Pulse 66   Temp 98 1 °F (36 7 °C) (Oral)   Resp 19   Ht 5' 8" (1 727 m)   Wt 79 8 kg (176 lb)   SpO2 94%   BMI 26 76 kg/m²   General appearance: alert and oriented, in no acute distress  Lungs: clear to auscultation bilaterally  Heart: irregularly irregular rhythm  Abdomen: soft, non-tender; bowel sounds normal; no masses,  no organomegaly  Extremities: non blanchable 1 cm well circumscribed ecchymotic lesion on 1st toe right foot non tender    Recent Results (from the past 24 hour(s))   Fingerstick Glucose (POCT)    Collection Time: 02/05/21  4:31 PM   Result Value Ref Range    POC Glucose 124 65 - 140 mg/dl   Fingerstick Glucose (POCT)    Collection Time: 02/05/21  9:02 PM   Result Value Ref Range    POC Glucose 113 65 - 140 mg/dl   CBC and differential    Collection Time: 02/06/21  6:31 AM   Result Value Ref Range    WBC 22 59 (H) 4 31 - 10 16 Thousand/uL    RBC 2 76 (L) 3 81 - 5 12 Million/uL    Hemoglobin 7 9 (L) 11 5 - 15 4 g/dL    Hematocrit 24 7 (L) 34 8 - 46 1 %    MCV 90 82 - 98 fL    MCH 28 6 26 8 - 34 3 pg    MCHC 32 0 31 4 - 37 4 g/dL    RDW 14 3 11 6 - 15 1 %    MPV 11 2 8 9 - 12 7 fL    Platelets 521 250 - 338 Thousands/uL    nRBC 0 /100 WBCs    Neutrophils Relative 81 (H) 43 - 75 %    Immat GRANS % 1 0 - 2 %    Lymphocytes Relative 7 (L) 14 - 44 %    Monocytes Relative 8 4 - 12 %    Eosinophils Relative 2 0 - 6 %    Basophils Relative 1 0 - 1 %    Neutrophils Absolute 18 41 (H) 1 85 - 7 62 Thousands/µL    Immature Grans Absolute 0 27 (H) 0 00 - 0 20 Thousand/uL    Lymphocytes Absolute 1 54 0 60 - 4 47 Thousands/µL    Monocytes Absolute 1 75 (H) 0 17 - 1 22 Thousand/µL    Eosinophils Absolute 0 47 0 00 - 0 61 Thousand/µL    Basophils Absolute 0 15 (H) 0 00 - 0 10 Thousands/µL   Comprehensive metabolic panel    Collection Time: 02/06/21  6:31 AM   Result Value Ref Range    Sodium 132 (L) 136 - 145 mmol/L    Potassium 4 2 3 5 - 5 3 mmol/L    Chloride 100 100 - 108 mmol/L    CO2 21 21 - 32 mmol/L    ANION GAP 11 4 - 13 mmol/L    BUN 71 (H) 5 - 25 mg/dL    Creatinine 2 49 (H) 0 60 - 1 30 mg/dL    Glucose 103 65 - 140 mg/dL    Calcium 9 0 8 3 - 10 1 mg/dL    Corrected Calcium 10 9 (H) 8 3 - 10 1 mg/dL    AST 34 5 - 45 U/L    ALT 19 12 - 78 U/L    Alkaline Phosphatase 72 46 - 116 U/L    Total Protein 5 4 (L) 6 4 - 8 2 g/dL    Albumin 1 6 (L) 3 5 - 5 0 g/dL    Total Bilirubin 0 20 0 20 - 1 00 mg/dL    eGFR 17 ml/min/1 73sq m   Magnesium    Collection Time: 02/06/21  6:31 AM   Result Value Ref Range    Magnesium 2 2 1 6 - 2 6 mg/dL   Phosphorus    Collection Time: 02/06/21  6:31 AM   Result Value Ref Range    Phosphorus 4 1 2 3 - 4 1 mg/dL   Protime-INR    Collection Time: 02/06/21  6:51 AM   Result Value Ref Range    Protime 30 5 (H) 11 6 - 14 5 seconds    INR 2 97 (H) 0 84 - 1 19   Fingerstick Glucose (POCT) Collection Time: 02/06/21  8:33 AM   Result Value Ref Range    POC Glucose 125 65 - 140 mg/dl   Fingerstick Glucose (POCT)    Collection Time: 02/06/21 11:12 AM   Result Value Ref Range    POC Glucose 134 65 - 140 mg/dl       Current Facility-Administered Medications   Medication Dose Route Frequency Provider Last Rate Last Admin    acetaminophen (TYLENOL) tablet 650 mg  650 mg Oral Q6H PRN Hanna Ornelas MD        ALPRAZolam [place]) tablet 0 5 mg  0 5 mg Oral HS PRN Piper Nevarez DO        glycerin-hypromellose- (ARTIFICIAL TEARS) ophthalmic solution 1 drop  1 drop Both Eyes Q6H PRN Ty Jonkaye Spcierkofi, DO   1 drop at 02/06/21 1054    insulin lispro (HumaLOG) 100 units/mL subcutaneous injection 1-6 Units  1-6 Units Subcutaneous TID AC Conway Jonkaye Spicerkofi, DO   1 Units at 02/03/21 1707    insulin lispro (HumaLOG) 100 units/mL subcutaneous injection 1-6 Units  1-6 Units Subcutaneous HS Ty Webbkaye Ozzie, DO        levothyroxine tablet 100 mcg  100 mcg Oral Early Morning Olamide Montanez DO   100 mcg at 02/06/21 8254    metoprolol succinate (TOPROL-XL) 24 hr tablet 150 mg  150 mg Oral BID Tydyllan Spicerkofi, DO   150 mg at 02/06/21 1052    multi-electrolyte (PLASMALYTE-A/ISOLYTE-S PH 7 4) IV solution  120 mL/hr Intravenous Continuous Conway Jonkaye Ozzie,  mL/hr at 02/05/21 1816 120 mL/hr at 02/05/21 1816    multivitamin-minerals (CENTRUM) tablet 1 tablet  1 tablet Oral Daily Ty Spicerkofi, DO   1 tablet at 02/06/21 1054    ondansetron (ZOFRAN-ODT) dispersible tablet 4 mg  4 mg Oral Q6H PRN Olamide Montanez DO   4 mg at 02/06/21 1213    oxyCODONE-acetaminophen (PERCOCET) 5-325 mg per tablet 1 tablet  1 tablet Oral Q4H PRN Hanna Ornelas MD   1 tablet at 02/06/21 1325    oxyCODONE-acetaminophen (PERCOCET) 5-325 mg per tablet 2 tablet  2 tablet Oral Q4H PRN Hanna Ornelas MD        pravastatin (PRAVACHOL) tablet 80 mg  80 mg Oral Daily With [employer], DO 80 mg at 02/05/21 1816    triamcinolone (KENALOG) 0 1 % cream   Topical BID [place] Kava, DO   Given at 02/06/21 9686       Invasive Devices     Peripheral Intravenous Line            Peripheral IV 02/04/21 Dorsal (posterior); Right Forearm 1 day                Lab, Imaging and other studies: I have personally reviewed pertinent reports      VTE Pharmacologic Prophylaxis: Reason for no pharmacologic prophylaxis supratherapeutic INR  VTE Mechanical Prophylaxis: sequential compression device    Conrado Zhang MD

## 2021-02-06 NOTE — PLAN OF CARE
Problem: Potential for Falls  Goal: Patient will remain free of falls  Description: INTERVENTIONS:  - Assess patient frequently for physical needs  -  Identify cognitive and physical deficits and behaviors that affect risk of falls  -  Hunters fall precautions as indicated by assessment   - Educate patient/family on patient safety including physical limitations  - Instruct patient to call for assistance with activity based on assessment  - Modify environment to reduce risk of injury  - Consider OT/PT consult to assist with strengthening/mobility  Outcome: Progressing     Problem: Prexisting or High Potential for Compromised Skin Integrity  Goal: Skin integrity is maintained or improved  Description: INTERVENTIONS:  - Identify patients at risk for skin breakdown  - Assess and monitor skin integrity  - Assess and monitor nutrition and hydration status  - Monitor labs   - Assess for incontinence   - Turn and reposition patient  - Assist with mobility/ambulation  - Relieve pressure over bony prominences  - Avoid friction and shearing  - Provide appropriate hygiene as needed including keeping skin clean and dry  - Evaluate need for skin moisturizer/barrier cream  - Collaborate with interdisciplinary team   - Patient/family teaching  - Consider wound care consult   Outcome: Progressing     Problem: Nutrition/Hydration-ADULT  Goal: Nutrient/Hydration intake appropriate for improving, restoring or maintaining nutritional needs  Description: Monitor and assess patient's nutrition/hydration status for malnutrition  Collaborate with interdisciplinary team and initiate plan and interventions as ordered  Monitor patient's weight and dietary intake as ordered or per policy  Utilize nutrition screening tool and intervene as necessary  Determine patient's food preferences and provide high-protein, high-caloric foods as appropriate       INTERVENTIONS:  - Monitor oral intake, urinary output, labs, and treatment plans  - Assess nutrition and hydration status and recommend course of action  - Evaluate amount of meals eaten  - Assist patient with eating if necessary   - Allow adequate time for meals  - Recommend/ encourage appropriate diets, oral nutritional supplements, and vitamin/mineral supplements  - Assess need for intravenous fluids  - Provide specific nutrition/hydration education as appropriate  - Include patient/family/caregiver in decisions related to nutrition  Outcome: Progressing

## 2021-02-06 NOTE — ASSESSMENT & PLAN NOTE
Warms to palpation of lateral aspect and tender with repositioning  US ordered - small to moderate effusion found  ID consulted - leukocytosis like 2/2 malignancy  Asymptomatic bacteruria  R knee US to assess effusion, if present consider arthrocentesis  Diverticulitis unlikely since no abd pain and tolerating diet so consider d/c antibiotics  2/5 suggest arthro right knee with cell count, bacterial cultures, crystal analysis  Discontinue Cipro and Flagyl  Ortho consult: Severe osteoarthritis of right knee  No obvious effusion but small, likely physiologic  Aspiration require US guidance  Not consistent with septic knee  With discomfort, no warmth, no erythema, no signs of infection  Pain management, PT for right knee

## 2021-02-06 NOTE — QUICK NOTE
I informed patient of the findings of the right knee ultrasound and x-ray and orthopedic surgeon and ID specialist recommendations  I explained the patient the orthopedic surgeon does not think this is the infected knee but ID does recommend aspiration for further testing of effusion  Patient states she does not want a ultrasound-guided arthrocentesis at this time  Patient was explained consequences of not doing this study and she verbalized understanding  I once again discussed the need for IR biopsy of pulmonary nodule and the importance of this determine if this is a malignancy determine further treatment course  Patient again declined IR biopsy  To be cosigned by Dr Funmi Adams

## 2021-02-06 NOTE — QUICK NOTE
Echocardiogram reviewed  Normal EF  LVH with diastolic dysfunction  Coumadin on hold for procedure  Stress test planned for Monday  Will reassess after stress test complete

## 2021-02-06 NOTE — ASSESSMENT & PLAN NOTE
Lab Results   Component Value Date    HGBA1C 5 8 09/05/2019       Recent Labs     02/05/21  1139 02/05/21  1631 02/05/21  2102 02/06/21  0833   POCGLU 113 124 113 125       Blood Sugar Average: Last 72 hrs:  (P) 122  Patient on sliding scale  Blood sugar has been within tolerable range

## 2021-02-06 NOTE — ASSESSMENT & PLAN NOTE
1 cm well-circumscribed ecchymotic lesion was noted on the tip of the 1st digit of the right foot, tender to palpation  · Patient is unclear how long this has been there  · With history of peripheral arterial disease, this could be a cholesterol emboli vs ischemic insult vs purple toe syndrome associated with warfarin therapy  · Doppler flow noted in dorsalis pedis pulse of right foot  Doppler flow not noted in posterior tibial pulse of right foot, dorsalis pedis pulse of left foot  Posterior tibial pulse of left foot was unable to be examined due to patient's positioning  · Continue pravastatin 80 mg daily    · Podiatry consult, recs appreciated

## 2021-02-06 NOTE — ASSESSMENT & PLAN NOTE
Patient presents with new onset weakness with inability to rise from toilet with 1 month of fatigue, reduced appetite, and weight loss  Etiology unknown  In context of patient with newly diagnosed vulvar intraepithelial neoplasia, has history of tobacco use, history of diverticulosis, uncontrolled diabetes, and arrhythmia  · CT chest, abdomen, pelvis:  Right-sided pulmonary nodule 2x1 3cm and enlarged arnulfo carinal lymph, Sigmoid fat stranding and enlarged left inguinal lymph node  · Abnormal UA (follow up urine culture)  · WBC: 24 56-> 25 5,-> 25 57 (persistent leukocytosis)  · Normoglycemia reduces suspicion for infection  · Discontinue Abx as per ID (help appreciated)  · Infectious disease  · CRP, ESR both elevated  · Fall precautions  · Follow up blood NGTD and Urine cultures positive for E coli (sensitive to ciprofloxacin which patient was on for 4 days) and patient had o symptoms  · Consult Oncology - help appreciated  · Flow cytometry, monitor CBC for WBCs and anemia  · Left shift and other cell lines elevated  Leukocytosis reactive but possibility of primary bone marrow disorder  flow cytometry on peripheral blood  Continue to monitor the WBC trend  Anemia workup can be later  Vulvar mass per gyn onc  Pulm nodule- concern for malignancy, IR consulted     · Gyn-Onc  · See recommendations in Vulvar intraepithelial Neoplasia  · Patient expressed interest in palliation and hospice  · Case management consult placed for hospice care, may need to be deferred for out-patient setting  ·  consult

## 2021-02-06 NOTE — ASSESSMENT & PLAN NOTE
Lab Results   Component Value Date    EGFR 17 02/06/2021    EGFR 20 02/05/2021    EGFR 35 02/04/2021    CREATININE 2 49 (H) 02/06/2021    CREATININE 2 23 (H) 02/05/2021    CREATININE 1 39 (H) 02/04/2021   ·

## 2021-02-06 NOTE — ASSESSMENT & PLAN NOTE
11 on 2/1 -> 9 2 on 2/2-> 10 on 2/3-> 10 0 2/4-> 8 9 on 2/5-> 7 9-> 7 6 on 2/7-> 6 6 on 2/8  Continue to monitor  Hematology on board, help appreciated  Patient transfused one uncrossmatched unit of blood (reprotedly O+), patient given 1L bolus of LR and Tylenol (as per heme onc) and patient's BP and temp remained stable following transfusion    Unit of matched blood prepared and follow up H&H ordered  Bedside FOBT by ARIANNA was negative

## 2021-02-06 NOTE — ASSESSMENT & PLAN NOTE
Patient recently seen in Dr Kvng Valentin office on 01/11/2021 and biopsy was taken  Results revealed vulvar intraepithelial neoplasia on 01/19/2021  Biopsy results shown below    Vulvar intraepithelial neoplasia, differentiated (simplex) type (dVIN), 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  -- Confirmed by positive p53 and negative p16/Ki-67 (increased proliferative index) immunostains  -- Associated spongiosis and inflammation (mostly neutrophils and eosinophils); special stain for fungus       (PAS) negative  - No definitive invasive carcinoma identified  Note: Although no definitive invasive carcinoma is identified, the biopsy is superficial and may not be representative of the entire lesion  Patient has (had) appointment scheduled with Dr Jonathan Fischer (gynecology Oncology) on 2/4  Patient is reluctant to further workup for pulmonary nodule this time  Monitor leukocytosis and symptoms in determining disposition     Gyne-Onc (help appreciated)  · Plan for surgery Tuesday 8:15 a m -> canceled  · Following extensive discussion with Family and interdisciplinary team, patient and family agree to hospice care

## 2021-02-07 PROBLEM — I95.2 HYPOTENSION DUE TO DRUGS: Status: ACTIVE | Noted: 2021-01-01

## 2021-02-07 NOTE — PLAN OF CARE
Problem: Potential for Falls  Goal: Patient will remain free of falls  Description: INTERVENTIONS:  - Assess patient frequently for physical needs  -  Identify cognitive and physical deficits and behaviors that affect risk of falls  -  [place] fall precautions as indicated by assessment   - Educate patient/family on patient safety including physical limitations  - Instruct patient to call for assistance with activity based on assessment  - Modify environment to reduce risk of injury  - Consider OT/PT consult to assist with strengthening/mobility  Outcome: Progressing  Fall precautions in place  Problem: Prexisting or High Potential for Compromised Skin Integrity  Goal: Skin integrity is maintained or improved  Description: INTERVENTIONS:  - Identify patients at risk for skin breakdown  - Assess and monitor skin integrity  - Assess and monitor nutrition and hydration status  - Monitor labs   - Assess for incontinence   - Turn and reposition patient  - Assist with mobility/ambulation  - Relieve pressure over bony prominences  - Avoid friction and shearing  - Provide appropriate hygiene as needed including keeping skin clean and dry  - Evaluate need for skin moisturizer/barrier cream  - Collaborate with interdisciplinary team   - Patient/family teaching  - Consider wound care consult   Outcome: Progressing  Q2 turns continued  PT/OT worked with patient today  Problem: Nutrition/Hydration-ADULT  Goal: Nutrient/Hydration intake appropriate for improving, restoring or maintaining nutritional needs  Description: Monitor and assess patient's nutrition/hydration status for malnutrition  Collaborate with interdisciplinary team and initiate plan and interventions as ordered  Monitor patient's weight and dietary intake as ordered or per policy  Utilize nutrition screening tool and intervene as necessary  Determine patient's food preferences and provide high-protein, high-caloric foods as appropriate  INTERVENTIONS:  - Monitor oral intake, urinary output, labs, and treatment plans  - Assess nutrition and hydration status and recommend course of action  - Evaluate amount of meals eaten  - Assist patient with eating if necessary   - Allow adequate time for meals  - Recommend/ encourage appropriate diets, oral nutritional supplements, and vitamin/mineral supplements  - Assess need for intravenous fluids  - Provide specific nutrition/hydration education as appropriate  - Include patient/family/caregiver in decisions related to nutrition  Outcome: Progressing  Encourage PO intake  Patient eats no more than 50% of meals   IVF continued

## 2021-02-07 NOTE — PROGRESS NOTES
Abner Stein 26 Progress Note - [facility] 80 y o  female MRN: [id number]    Unit/Bed#: [address] [place] [address] Encounter: [id number]  Summary:  Expressed interest in palliation and hospice care  F:   cc per hour  E:  Replete as needed  N:  Heart healthy diet  D:  SCDs, holding warfarin (pre-op)    * Generalized muscle weakness and persistent leukocytosis  Assessment & Plan  Patient presents with new onset weakness with inability to rise from toilet with 1 month of fatigue, reduced appetite, and weight loss  Etiology unknown  In context of patient with newly diagnosed vulvar intraepithelial neoplasia, has history of tobacco use, history of diverticulosis, uncontrolled diabetes, and arrhythmia  · CT chest, abdomen, pelvis:  Right-sided pulmonary nodule 2x1 3cm and enlarged arnulfo carinal lymph, Sigmoid fat stranding and enlarged left inguinal lymph node  · Abnormal UA (follow up urine culture)  · WBC: 24 56-> 25 5,-> 25 57 (persistent leukocytosis)  · Normoglycemia reduces suspicion for infection  · Discontinue Abx as per ID (help appreciated)  · Infectious disease  · CRP, ESR both elevated  · Fall precautions  · Follow up blood NGTD and Urine cultures positive for E coli (sensitive to ciprofloxacin which patient was on for 4 days) and patient had o symptoms  · Consult Oncology - help appreciated  · Flow cytometry, monitor CBC for WBCs and anemia  · Left shift and other cell lines elevated  Leukocytosis reactive but possibility of primary bone marrow disorder  flow cytometry on peripheral blood  Continue to monitor the WBC trend  Anemia workup can be later  Vulvar mass per gyn onc  Pulm nodule- concern for malignancy, IR consulted     · Gyn-Onc  · See recommendations in Vulvar intraepithelial Neoplasia  · Patient expressed interest in palliation and hospice  · Case management consult placed for hospice care, may need to be deferred for out-patient setting  ·  consult    Vulvar intraepithelial neoplasia, differentiated-type (dVIN)  Assessment & Plan  Patient recently seen in Dr Jason Avelar office on 01/11/2021 and biopsy was taken  Results revealed vulvar intraepithelial neoplasia on 01/19/2021  Biopsy results shown below    Vulvar intraepithelial neoplasia, differentiated (simplex) type (dVIN), 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  -- Confirmed by positive p53 and negative p16/Ki-67 (increased proliferative index) immunostains  -- Associated spongiosis and inflammation (mostly neutrophils and eosinophils); special stain for fungus       (PAS) negative  - No definitive invasive carcinoma identified  Note: Although no definitive invasive carcinoma is identified, the biopsy is superficial and may not be representative of the entire lesion  Patient has (had) appointment scheduled with Dr Yuval Kc (gynecology Oncology) on 2/4  Patient is reluctant to further workup for pulmonary nodule this time  Monitor leukocytosis and symptoms in determining disposition     Gyne-Onc (help appreciated)  · Plan for surgery Tuesday 8:15 a m  · Cardiology eval:   · Echo - normal EF and LVH with diastolic dysfunction  · Stress test ordered  · NPO after midnight 2/8 to 2/9  · If Monday INR still >2 give small dose vit K  · Procal elevated at 1 26  Notified Dr Yuval Kc of patient's desire to be on hospice    Pulmonary nodule  Assessment & Plan  Potential biopsy  · IR consulted discontinued  · Patient expresses she does not wish to undergo biopsy this  · She is aware she cannot have biopsy concomitantly with vulvar mass surgery  · Oncology on board    Diverticulosis with fat stranding and leukocytosis  Assessment & Plan  CT: Diverticulosis coli  Possible segmental perisigmoid fat stranding may be due to acute diverticulitis, although evaluation is limited    · Surgical soft diet  · LR discontinued  · Continue Cipro 400mg p o  q12h and metronidazole 500mg p o  q8H  · Trend vitals, WBCs, and exam findings/symptoms  · Leukocytisis fluctuating 23 on 2/1 to 18 on 2/2 24 5 on 2/3, 25 5 on 2/4  · Appears to change in sync with hgb and platelets  · Patient tolerated diet advancement well  · ID recommended discontinuation of antibiotics    Acute on chronic kidney failure [facility])  Assessment & Plan  Lab Results   Component Value Date    EGFR 17 02/06/2021    EGFR 20 02/05/2021    EGFR 35 02/04/2021    CREATININE 2 49 (H) 02/06/2021    CREATININE 2 23 (H) 02/05/2021    CREATININE 1 39 (H) 02/04/2021   · Acute on chronic injury  · Continue to monitor renal function  · Plasmalyte 120ml/hr  · Correct electrolyte abnormalities  · Hold Diovan/losartan, consider restarting tomorrow if renal function continues to improve  · Bladder scan, retention protocol, I/Os, daily weights  · Urine Na pending  · Consider Nephrology consult    Type 2 diabetes mellitus with diabetic chronic kidney disease [facility])  Assessment & Plan  Lab Results   Component Value Date    HGBA1C 5 8 09/05/2019       Recent Labs     02/05/21  1139 02/05/21  1631 02/05/21  2102 02/06/21  0833   POCGLU 113 124 113 125       Blood Sugar Average: Last 72 hrs:  (P) 122  Patient on sliding scale  Blood sugar has been within tolerable range    Paroxysmal atrial fibrillation ([facility] [address] )  Assessment & Plan  [id number]  On coumadin  Follows with [facility] cardiology (Dr Deg[place] visit 8/11/2020)  · Continue home metoprolol 150 mg p o   BID  · Telemetry  · Start patient on 2 5 mg coumadin daily, and monitor INR  · Aware patient on antibiotics which may affect P450 metabolism  · INR 2 06 on 2/2, 2 29 on 2/3, 2 47 on 2/4, 2 59 on 2/5  · Holding warfarin   · Start heparin drip    Acquired hypothyroidism  Assessment & Plan  Home levothyroxine 112mcg, consider change in dose  · TSH low at 0 322, dose reduced to 100mcg daily      Macular degeneration  Assessment & Plan  Patient reports taking OTC eye drops-> given artificial tears PRN  · Preservision not available in pharmacy  · Reports receiving a medication from her retinal specialist every 2 weeks    Right knee pain  Assessment & Plan  Warms to palpation of lateral aspect and tender with repositioning  US ordered - small to moderate effusion found  ID consulted - leukocytosis like 2/2 malignancy  Asymptomatic bacteruria  R knee US to assess effusion, if present consider arthrocentesis  Diverticulitis unlikely since no abd pain and tolerating diet so consider d/c antibiotics  2/5 suggest arthro right knee with cell count, bacterial cultures, crystal analysis  Discontinue Cipro and Flagyl  Ortho consult: Severe osteoarthritis of right knee  No obvious effusion but small, likely physiologic  Aspiration require US guidance  Not consistent with septic knee  With discomfort, no warmth, no erythema, no signs of infection  Pain management, PT for right knee  Left wrist pain  Assessment & Plan  Experiencing 2 day history of left wrist and associated swelling, patient unable to fully describe trauma (may have been caused during assisted transfer)  · Patient has left antecubital peripheral line proximal to edema -> asked nurse to switched to other arm  · Tenderness appears to be in soft tissues  · X-ray read pending  · Patient refusing ice  · Tylenol 650 PO PRN    Ingrown toenail  Assessment & Plan  First digits of bilateral feet  Cause of significant discomfort to the patient  - sensitivity of left great toe reported today  - continue to monitor for infection  - will start foot soaks and steroid therapy with topical triamcinolone  - podiatry consulted      Hypotension due to drugs  Assessment & Plan  Suspected due to opoid analgesia  Patient had BP in 80s/50s over night  · Patient given midodrine  · Attempt to find balance between adequate analgesia and maintaining BP  Hypotension may be reason for worsened KIKE    Blue toe syndrome (HCC)  Assessment & Plan    1 cm well-circumscribed ecchymotic lesion was noted on the tip of the 1st digit of the right foot, tender to palpation  · Patient is unclear how long this has been there  · With history of peripheral arterial disease, this could be a cholesterol emboli vs ischemic insult vs purple toe syndrome associated with warfarin therapy  · Doppler flow noted in dorsalis pedis pulse of right foot  Doppler flow not noted in posterior tibial pulse of right foot, dorsalis pedis pulse of left foot  Posterior tibial pulse of left foot was unable to be examined due to patient's positioning  · Continue pravastatin 80 mg daily  · Podiatry consult, recs appreciated    UTI (urinary tract infection)  Assessment & Plan  Asymptomatic  Urine culture positive for >100,000 E  Coli  Tenderness in lower abdomen on examination  Denies urinary symptoms  Patient currently on ciprofloxacin and metronidazole for presumed diverticulitis  · E coli sensitive to ciprofloxacin, which patient took for 3 days    Anemia  Assessment & Plan  11 on 2/1 -> 9 2 on 2/2-> 10 on 2/3-> 10 0 2/4-> 8 9 on 2/5-> 7 9-> 7 6 on 2/7  Continue to monitor  Hematology on board, help appreciated      History of sick sinus syndrome  Assessment & Plan  Pace maker in place    Type 2 diabetes mellitus with diabetic peripheral angiopathy without gangrene [facility])  Assessment & Plan  Lab Results   Component Value Date    HGBA1C 5 8 09/05/2019       Recent Labs     02/05/21  1139 02/05/21  1631 02/05/21  2102 02/06/21  0833   POCGLU 113 124 113 125       Blood Sugar Average: Last 72 hrs:  (P) 122    Peripheral arteriosclerosis (HCC)  Assessment & Plan  Pain and ecchymosis of of bilateral great toe pulp  Podiatry consulted    ---------------------------------------------------------------------------------------  SUBJECTIVE  Patient seen examined at bedside  Patient reports aches and pains throughout the body, particularly in the legs and flank  Patient also experienced nausea    Patient states she would like to be comfortable she is interested in palliative care and would like to be on hospice  Patient expresses she subscribes to no [employer], however she is open to seeing   Review of Systems   Constitutional: Positive for chills and fatigue  Negative for appetite change ( interest to advance the diet), diaphoresis and fever  HENT: Negative for congestion  Eyes: Positive for visual disturbance (Macular degeneration)  Respiratory: Negative for chest tightness, shortness of breath and wheezing  Apnea:  degeneration  Cardiovascular: Negative for chest pain, palpitations and leg swelling  Gastrointestinal: Positive for nausea  Negative for abdominal pain, blood in stool, constipation ( patient reports straining with bowel movements but is regular ) and diarrhea  Endocrine: Positive for cold intolerance  Genitourinary: Positive for genital sores and vaginal pain ( positional)  Negative for difficulty urinating, dysuria, flank pain, urgency and vaginal bleeding  Vaginal mass   Musculoskeletal: Positive for joint swelling (Left wrist) and myalgias (Left wrist improved)  Negative for back pain  Pain of left wrist   Skin: Negative for color change and pallor  Neurological: Negative for headaches  Hematological: Negative for adenopathy  Does not bruise/bleed easily  Psychiatric/Behavioral: Positive for dysphoric mood  Negative for behavioral problems         ---------------------------------------------------------------------------------------  OBJECTIVE    Vitals   Vitals:    21 0600 21 0645 21 0745 21 1328   BP:  97/50 102/51 (!) 96/36   BP Location:  Right arm Right arm Right arm   Pulse:   90    Resp:   19    Temp:   98 1 °F (36 7 °C)    TempSrc:   Oral    SpO2:   93%    Weight: 79 3 kg (174 lb 14 4 oz)      Height:         Temp (24hrs), Av 5 °F (36 4 °C), Min:97 °F (36 1 °C), Max:98 1 °F (36 7 °C)  Current: Temperature: 98 1 °F (36 7 °C)            Physical Exam  Constitutional:       General: She is not in acute distress  Appearance: Normal appearance  She is normal weight  She is ill-appearing  She is not toxic-appearing or diaphoretic  HENT:      Head: Normocephalic and atraumatic  Mouth/Throat:      Mouth: Mucous membranes are dry  Pharynx: Oropharynx is clear  No oropharyngeal exudate or posterior oropharyngeal erythema  Comments: Scar over left lip s/p BCC excision  Eyes:      General:         Right eye: No discharge  Left eye: No discharge  Extraocular Movements: Extraocular movements intact  Conjunctiva/sclera: Conjunctivae normal    Cardiovascular:      Rate and Rhythm: Normal rate and regular rhythm  Pulses: Normal pulses  Heart sounds: Murmur present  No friction rub  No gallop  Pulmonary:      Effort: Pulmonary effort is normal  No respiratory distress  Breath sounds: Normal breath sounds  No stridor  No wheezing, rhonchi or rales  Comments: Intermittent vocalizations of discomfort  Abdominal:      General: Abdomen is flat  Bowel sounds are normal  There is no distension  Palpations: Abdomen is soft  There is no mass  Tenderness: There is no abdominal tenderness  There is right CVA tenderness and left CVA tenderness (Mild and Greater than right)  There is no guarding or rebound  Hernia: No hernia is present  Genitourinary:     Labia:         Left: Tenderness and lesion (mass) present  No injury  Comments: 3cm, tender, parasagittal left labial mass  No purulence, erythema, active bleeding or odor detected  Intertriginous region of gluteal cleft, posterior to lesion is moist and irritated   Musculoskeletal:         General: Swelling ( left wrist with active/passive ROM limited by pain (improved since yesterday), right knee with associated lateral warmth  Left knee also appears to be warmer on lateral aspect) and tenderness (reduced in left wrist since yesterday  Toes and right knee remained sensitive) present        Right lower leg: No edema  Left lower leg: No edema  Lymphadenopathy:      Head:      Right side of head: No submandibular adenopathy  Left side of head: No submandibular adenopathy  Cervical: No cervical adenopathy  Upper Body:      Right upper body: No axillary adenopathy  Left upper body: No axillary adenopathy  Lower Body: No right inguinal adenopathy  No left inguinal adenopathy  Skin:     General: Skin is warm and dry  Coloration: Skin is pale  Findings: Bruising ( tenderness and pulp of bilateral great toes) and lesion present  No erythema or rash  Neurological:      General: No focal deficit present  Mental Status: She is alert and oriented to person, place, and time  Mental status is at baseline  Cranial Nerves: No cranial nerve deficit  Sensory: No sensory deficit  Motor: Weakness (LLE 2/5, RLE 3/5, LUE patient deferred, LLE 4-5) present        Coordination: Coordination normal       Comments: Upper extremities 5/5 strength   Psychiatric:         Mood and Affect: Mood normal       Comments: Intermittently agitated/oppositional         Laboratory and Diagnostics:  Results from last 7 days   Lab Units 02/07/21  0618 02/06/21  0631 02/05/21  0609 02/04/21  0509 02/03/21  0546 02/02/21  0444 02/01/21  1124   WBC Thousand/uL 28 04* 22 59* 25 57* 25 50* 24 56* 18 28* 23 89*   HEMOGLOBIN g/dL 7 6* 7 9* 8 9* 9 7* 10 0* 9 2* 11 0*   HEMATOCRIT % 24 5* 24 7* 28 3* 31 2* 32 4* 29 7* 36 5   PLATELETS Thousands/uL 366 304 296 276 271 245 305   NEUTROS PCT % 81* 81* 82* 84* 86* 77* 84*   MONOS PCT % 7 8 9 10 8 10 5     Results from last 7 days   Lab Units 02/07/21  0618 02/06/21  0631 02/05/21  0609 02/04/21  0509 02/03/21  0546 02/02/21  0444 02/01/21  1124   SODIUM mmol/L 130* 132* 131* 134* 134* 135* 135*   POTASSIUM mmol/L 4 6 4 2 4 2 4 0 4 2 3 6 3 7   CHLORIDE mmol/L 97* 100 100 102 103 105 101   CO2 mmol/L 20* 21 24 25 24 25 26   ANION GAP mmol/L 13 11 7 7 7 5 8   BUN mg/dL 86* 71* 46* 21 17 22 20   CREATININE mg/dL 3 10* 2 49* 2 23* 1 39* 1 25 1 33* 1 70*   CALCIUM mg/dL 8 6 9 0 9 4 9 6 9 4 9 3 10 3*   GLUCOSE RANDOM mg/dL 96 103 107 113 122 104 258*   ALT U/L 21 19 15 14 14 16 20   AST U/L 46* 34 27 19 14 16 25   ALK PHOS U/L 74 72 82 92 94 83 102   ALBUMIN g/dL 1 7* 1 6* 1 7* 1 8* 2 0* 2 0* 2 4*   TOTAL BILIRUBIN mg/dL 0 30 0 20 0 30 0 50 0 50 0 50 0 60     Results from last 7 days   Lab Units 02/07/21  0618 02/06/21  0631 02/05/21  0609 02/04/21  0509 02/03/21  0546 02/02/21  0444 02/01/21  1124   MAGNESIUM mg/dL 2 5 2 2 2 0 1 8 2 1 1 7 1 9   PHOSPHORUS mg/dL 5 6* 4 1 3 4 3 0 2 9 3 0  --       Results from last 7 days   Lab Units 02/07/21  0618 02/06/21  0651 02/05/21  0609 02/04/21  0509 02/03/21  0546 02/02/21  0445 02/01/21  1130   INR  3 15* 2 97* 2 59* 2 47* 2 29* 2 06* 1 73*   PTT seconds  --   --   --   --   --   --  33      Results from last 7 days   Lab Units 02/01/21  1124   TROPONIN I ng/mL 0 04     Results from last 7 days   Lab Units 02/01/21  1923 02/01/21  1415   LACTIC ACID mmol/L 2 0 2 1*     ABG:    VBG:    Results from last 7 days   Lab Units 02/06/21  0631 02/05/21  0609   PROCALCITONIN ng/ml 1 25* 1 26*       Micro  Results from last 7 days   Lab Units 02/01/21  1441 02/01/21  1415   BLOOD CULTURE   --  No Growth After 5 Days  No Growth After 5 Days  URINE CULTURE  >100,000 cfu/ml Escherichia coli*  >100,000 cfu/ml   --          Imaging: I have personally reviewed pertinent reports  Intake and Output  I/O       02/05 0701 - 02/06 0700 02/06 0701 - 02/07 0700 02/07 0701 - 02/08 0700    P  O   850     I V  (mL/kg)  2950 (37 2) 500 (6 3)    IV Piggyback   500    Total Intake(mL/kg)  3800 (47 9) 1000 (12 6)    Urine (mL/kg/hr) 500 (0 3) 1200 (0 6)     Total Output 500 1200     Net -500 +2600 +1000           Unmeasured Urine Occurrence   1 x    Unmeasured Stool Occurrence   2 x          Height and Weights   Height: 5' 8" (172 7 cm)  IBW: 63 9 kg  Body mass index is 26 59 kg/m²  Weight (last 2 days)     Date/Time   Weight    02/07/21 0600   79 3 (174 9)                Nutrition       Diet Orders   (From admission, onward)             Start     Ordered    02/08/21 0001  Diet Cardiovascular; Stress Test (1 Meal); No Caffeine  Diet effective midnight     Comments: *If patient is having a stress test, no caffeine, decaf, or chocolate is to be given*   Question Answer Comment   Diet Type Cardiovascular    Cardiac Stress Test (1 Meal)    Other Restriction(s): No Caffeine    RD to adjust diet per protocol? Yes        02/05/21 1550    02/05/21 1523  Dietary nutrition supplements  Once     Question Answer Comment   Select Supplement: Ensure Compact-Chocolate    Frequency Breakfast, Dinner        02/05/21 1522    02/05/21 0745  Diet Regular; Regular House  Diet effective now     Question Answer Comment   Diet Type Regular    Regular Regular House    RD to adjust diet per protocol?  Yes        02/05/21 0744    02/04/21 1255  Room Service  Once     Question:  Type of Service  Answer:  Room Service - Appropriate with Assistance    02/04/21 1254                  Active Medications  Scheduled Meds:  Current Facility-Administered Medications   Medication Dose Route Frequency Provider Last Rate    acetaminophen  650 mg Oral Q6H PRN Raisa Gauthier MD      ALPRAZolam  0 5 mg Oral HS PRN Dima Betts, DO      glycerin-hypromellose-  1 drop Both Eyes Q6H PRN Lucia Pack Kokotek, DO      insulin lispro  1-6 Units Subcutaneous TID AC Ramon Horne, DO      insulin lispro  1-6 Units Subcutaneous HS Ramon Frost DO      levothyroxine  100 mcg Oral Early Morning Olamide Montanez, DO      metoprolol succinate  150 mg Oral BID Dima Betts, DO      multi-electrolyte  120 mL/hr Intravenous Continuous Lucia Pack Kokotek,  mL/hr (02/07/21 1216)    multivitamin-minerals  1 tablet Oral Daily Ramon Horne DO      ondansetron  4 mg Oral Q6H PRN Jeannette Spann,       oxyCODONE-acetaminophen  1 tablet Oral Q4H PRN Hien Anton MD      oxyCODONE-acetaminophen  2 tablet Oral Q4H PRN Hien Anton MD      pravastatin  80 mg Oral Daily With [employer], DO      triamcinolone   Topical BID Beatrzi Hart DO       Continuous Infusions:  multi-electrolyte, 120 mL/hr, Last Rate: 120 mL/hr (02/07/21 1216)      PRN Meds:   acetaminophen, 650 mg, Q6H PRN  ALPRAZolam, 0 5 mg, HS PRN  glycerin-hypromellose-, 1 drop, Q6H PRN  ondansetron, 4 mg, Q6H PRN  oxyCODONE-acetaminophen, 1 tablet, Q4H PRN  oxyCODONE-acetaminophen, 2 tablet, Q4H PRN        Invasive Devices Review  Invasive Devices     Peripheral Intravenous Line            Peripheral IV 02/04/21 Dorsal (posterior); Right Forearm 2 days                    Chris Mayes DO      Portions of the record may have been created with voice recognition software  Occasional wrong word or "sound a like" substitutions may have occurred due to the inherent limitations of voice recognition software    Read the chart carefully and recognize, using context, where substitutions have occurred

## 2021-02-07 NOTE — QUICK NOTE
Notified by nurse with hypotensive readings of SBP 72N over diastolic 29F, persisted after 500cc NS bolus  Will give one dose of Midodrine 2 5 mg PO at this time  Patient is currently sleeping comfortably

## 2021-02-07 NOTE — ASSESSMENT & PLAN NOTE
Suspected due to opoid analgesia  Patient had BP in 80s/50s over night  · Patient given midodrine  · Attempt to find balance between adequate analgesia and maintaining BP  Hypotension may be reason for worsened KIKE

## 2021-02-07 NOTE — OCCUPATIONAL THERAPY NOTE
OT TREATMENT       02/07/21 9890   Note Type   Note Type Treatment   Restrictions/Precautions   Other Precautions Chair Alarm; Bed Alarm; Fall Risk;Pain   Pain Assessment   Pain Assessment Tool 0-10   Pain Score 7   Pain Location/Orientation Orientation: Right;Location: Buttocks  (L wrist, R knee, toes)   ADL   Grooming Assistance 5  Supervision/Setup   Grooming Deficit Wash/dry face   Grooming Comments use of R hand only    Bed Mobility   Additional Comments pt refused bed mobility   ROM- Right Upper Extremities   R Shoulder AROM; Flexion   R Elbow AROM;Elbow flexion;Elbow extension   R Hand AROM; Thumb; Index finger; Long finger;Ring finger;Little finger   R Weight/Reps/Sets 10 times each with verbal cues to attend to task    ROM - Left Upper Extremities    L Shoulder AAROM; Flexion   L Elbow AAROM;Elbow flexion;Elbow extension   L Wrist AAROM;Wrist extension;Wrist flexion   L Hand AAROM; Index finger; Thumb; Long finger;Ring finger;Little finger   L Weight/Reps/Sets 10 times each, minimal participation    LUE ROM Comment + edema R hand   Activity Tolerance   Medical Staff Made Aware yes, nurse   Assessment   Assessment Patient agreeable to OT session  Patient is generally weak, deconditioned and fatigued with activity  10 minutes into session patient reports "I'm done", and reports she is too tired to continue  Patient lethargic at times during session  Patient with poor activity tolerance today  Patient will benefit from continued OT services to maximize functional performance with ADLS  The patient's raw score on the AM-PAC Daily Activity inpatient short form is 12, standardized score is 30 6, less than 39 4  Patients at this level are likely to benefit from DC to post-acute rehabilitation services  Please refer to the recommendation of the Occupational Therapist for safe DC planning  Plan   Treatment Interventions ADL retraining;UE strengthening/ROM; Endurance training; Activityengagement   OT Frequency 3-5x/wk   Recommendation   OT Discharge Recommendation Post-Acute Rehabilitation Services   AM-PAC Daily Activity Inpatient   Lower Body Dressing 1   Bathing 2   Toileting 1   Upper Body Dressing 2   Grooming 3   Eating 3   Daily Activity Raw Score 12   Daily Activity Standardized Score (Calc for Raw Score >=11) 30 6   AM-PAC Applied Cognition Inpatient   Following a Speech/Presentation 3   Understanding Ordinary Conversation 3   Taking Medications 2   Remembering Where Things Are Placed or Put Away 1   Remembering List of 4-5 Errands 1   Taking Care of Complicated Tasks 1   Applied Cognition Raw Score 11   Applied Cognition Standardized Score 75 34   Licensure   [place] License Number  [place] [address] OTR/L [id number]

## 2021-02-08 NOTE — TREATMENT PLAN
I saw the patient to discuss her recent change in plan to transition to hospice  She was somnolent- could not complete a sentence  Will discuss the risks and benefits of her surgery with her POA and the primary medical team   Based on her current performance status, it is unlikely that radical vulvectomy-even if successful in removing her local disease- will result in a significant improvement in her performance status

## 2021-02-08 NOTE — QUICK NOTE
Quick note  02/08/2021 5614    Patient initially scheduled for nuclear stress test today for risk stratification for possible radical vulvectomy  Testing was placed on hold due to hypotension  Review of records and after conversation with primary team, patient now is leaning more towards comfort oriented care  Family meeting is scheduled for this afternoon    Will hold nuclear stress test at this time pending further information about plan of care    [facility]  Cardiology

## 2021-02-08 NOTE — PHYSICAL THERAPY NOTE
02/08/21 1345   PT Last Visit   PT Visit Date 02/08/21   Note Type   Note Type Treatment   Cancel Reasons Medical status  (low BP:lethargic; Cx by RN)   Licensure   [place] License Number  [place], [place] [id number]

## 2021-02-08 NOTE — PROGRESS NOTES
Progress Note - Infectious Disease   Lynda Mena 80 y o  female MRN: [id number]  Unit/Bed#: [facility] 327-01 Encounter: [id number]      Impression/Recommendations:  1  Leukocytosis  Likely multifactorial and noninfectious due to malignancy, anemia, dehydration, possible gouty arthritis right knee, left arm phlebitis  No obvious source of infection  No change with empirical antibiotics for UTI  Blood cultures, CT C/A/P negative  Hemodynamically stable but appears globally ill  Rec:  · Continue to follow closely off antibiotics  · Consider empiric gout treatment  · Await HPM consult and possible transition to hospice    2   E  Coli UTI versus asymptomatic bacteruria  Status post 4 days cipro  3   KIKE on CKD  Worsening  4   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  5   Right knee arthritis  Severe OA versus gout  Insufficient fluid to tap per Ortho evaluation  Patient declined U/S guided aspiration  6   Anemia  Progressive  Consider GIB given black stools noted by nursing  Antibiotics:  Off antibiotics #4  Subjective:  Patient seen on AM rounds  Lethargic/despondant and offers limited ROS  24 Hour Events:  No documented fevers, chills, sweats, nausea, vomiting  Loose black stools noted by nursing  Patient expressed desire for hospice to primary team over weekend  Objective:  Vitals:  Temp:  [97 8 °F (36 6 °C)-98 2 °F (36 8 °C)] 98 1 °F (36 7 °C)  HR:  [94-97] 94  Resp:  [16-18] 18  BP: ()/(36-73) 100/56  SpO2:  [94 %-97 %] 97 %  Temp (24hrs), Av °F (36 7 °C), Min:97 8 °F (36 6 °C), Max:98 2 °F (36 8 °C)  Current: Temperature: 98 1 °F (36 7 °C)    Physical Exam:   General:  No acute distress  Psychiatric:  Awake and alert  Pulmonary:  Normal respiratory excursion without accessory muscle use  Abdomen:  Soft, nontender  Extremities:  Doughy left arm edema, no cellulitis    Right knee warmth, swelling tenderness to palpation  Skin:  No abran    Lab Results:  I have personally reviewed pertinent labs  Results from last 7 days   Lab Units 02/08/21  0543 02/07/21  0618 02/06/21  0631   POTASSIUM mmol/L 4 3 4 6 4 2   CHLORIDE mmol/L 101 97* 100   CO2 mmol/L 22 20* 21   BUN mg/dL 89* 86* 71*   CREATININE mg/dL 3 22* 3 10* 2 49*   EGFR ml/min/1 73sq m 13 13 17   CALCIUM mg/dL 8 5 8 6 9 0   AST U/L 49* 46* 34   ALT U/L 21 21 19   ALK PHOS U/L 88 74 72     Results from last 7 days   Lab Units 02/08/21  0543 02/07/21  0618 02/06/21  0631   WBC Thousand/uL 26 06* 28 04* 22 59*   HEMOGLOBIN g/dL 6 6* 7 6* 7 9*   PLATELETS Thousands/uL 371 366 304     Results from last 7 days   Lab Units 02/01/21  1441 02/01/21  1415   BLOOD CULTURE   --  No Growth After 5 Days  No Growth After 5 Days  URINE CULTURE  >100,000 cfu/ml Escherichia coli*  >100,000 cfu/ml   --        Imaging Studies:   I have personally reviewed pertinent imaging study reports and images in PACS  EKG, Pathology, and Other Studies:   I have personally reviewed pertinent reports

## 2021-02-08 NOTE — PROGRESS NOTES
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, gyn oncology discussed possible treatment options with the patient  Patient is not interested in chemotherapy for distant disease, radiation for local disease, or surgical resection  She was initially interested in potentially palliating the vulvar lesion with surgery  Patient believes that her performance status will improve if going this route  Per gyn/onc, it is unlikely that radical vulvectomy will result in improvement in performance status  In more recent discussions with the patient, per primary team patient wishes for more comfort oriented care vs hospice  Palliative care consult in pending  Hemoglobin was boderline low yesterday  We were informed from the primary team that her Hgb was 6 6 today  She will receive 1UPRBCs  We will continue to monitor  Transfuse as necessary <7 0    __________________________________________________________________________________________    Chief complaint: generalized weakness     History of present illness: 45-year-old female presented to the emergency room on February 1, 2021 with progressive weakness and fatigue  Patient was recently found to have a vulvar mass and had a pending gyn oncology evaluation  The lesion apparently had been bleeding intermittently  Patient also has history of weight loss with decreased appetite    Additional workup demonstrated leukocytosis and anemia       Mrs Alla Guzman was found in bed in no apparent distress  Patient was responsive to questioning  Patient stated that she had some left wrist pain, etiology unclear  Pelvic pain is about the same as before  Appetite is +/- but no nausea or vomiting  No obvious GI bleeding  No shortness of breath at rest, no dyspnea on exertion  Patient denied any known prior CBC abnormalities      Hospital medications list:  Current Facility-Administered Medications   Medication Dose Route Frequency Provider Last Rate    acetaminophen  650 mg Oral Q6H PRN Jessica Patricio MD      ALPRAZolam  0 5 mg Oral HS PRN Gracie Brood Kokotek, DO      glycerin-hypromellose-  1 drop Both Eyes Q6H PRN Gracie Yeung Kokotek, DO      insulin lispro  1-6 Units Subcutaneous TID AC Ramon J Kokotek, DO      insulin lispro  1-6 Units Subcutaneous HS Ramon J Kokotek, DO      lactated ringers  1,000 mL Intravenous Once Dania Huitron, DO      levothyroxine  100 mcg Oral Early Morning Amie Elkins, DO      metoprolol succinate  150 mg Oral BID Jordan Francois, DO      multivitamin-minerals  1 tablet Oral Daily Gracie Frost, DO      ondansetron  4 mg Oral Q6H PRN Amie Elkins, DO      oxyCODONE-acetaminophen  1 tablet Oral Q4H PRN Jessica Patricio MD      oxyCODONE-acetaminophen  2 tablet Oral Q4H PRN Jessica Patricio MD      pravastatin  80 mg Oral Daily With [employer], DO      sodium chloride  100 mL/hr Intravenous Continuous Anastasia Mikal,  mL/hr (02/07/21 2122)    triamcinolone   Topical BID Rick Razo,               Physical exam  /56 (BP Location: Right arm)   Pulse 78   Temp 97 8 °F (36 6 °C) (Oral)   Resp 18   Ht 5' 8" (1 727 m)   Wt 79 3 kg (174 lb 14 4 oz)   SpO2 96%   BMI 26 59 kg/m²   Constitutional: Well formed, well-nourished  in no apparent distress  Eyes: PERRL, conjunctiva , anicteric    HENT: Atraumatic, external ears normal, nose normal,    oropharynx moist, no pharyngeal exudates, no thrush, pink  Neck: Good range of motion, no adenopathy  Respiratory: CTA throughout  No rale or wheezing  Cardiovascular: Normal rate, normal rhythm, no murmurs, no gallops, no rubs    GI: Soft, nondistended, normal bowel sounds, nontender, no organomegaly, no mass, no rebound, no guarding    : No costovertebral angle tenderness, normal reproductive organs, no discharge  Musculoskeletal: No pain or tenderness with palpation of joints, muscles or bones  Integument: Skin is dry and warm  No rashes  Lymphatic: No adenopathy in the neck, supra-clavicular region, axilla and groin bilaterally  Extremities: No peripheral edema  Neurologic: Disoriented, CN 2-12 normal, normal motor function, normal sensory function, no focal deficits noted  Psychiatric: Somnolent  Rectal: Deferred    Laboratory test results  WBC 4 31 - 10 16 Thousand/uL 26  06High     RBC 3 81 - 5 12 Million/uL 2 26Low     Hemoglobin 11 5 - 15 4 g/dL 6 6Low Panic     Comment: Results verified by repeat  This result has been called to [address] [place] [address],[facility] by Agusto Hagen on 02 08 2021 at [phone], and has been read back  Hematocrit 34 8 - 46 1 % 21  0Low     MCV 82 - 98 fL 93    MCH 26 8 - 34 3 pg 28 8    MCHC 31 4 - 37 4 g/dL 31 0Low     RDW 11 6 - 15 1 % 14 7    MPV 8 9 - 12 7 fL 10 7    Platelets 793 - 007 Thousands/uL 371    nRBC /100 WBCs 0    Neutrophils Relative 43 - 75 % 82High     Immat GRANS % 0 - 2 % 2    Lymphocytes Relative 14 - 44 % 6Low     Monocytes Relative 4 - 12 % 6    Eosinophils Relative 0 - 6 % 3    Basophils Relative 0 - 1 % 1    Neutrophils Absolute 1 85 - 7 62 Thousands/µL 21  36High     Immature Grans Absolute 0 00 - 0 20 Thousand/uL >0  50High     Lymphocytes Absolute 0 60 - 4 47 Thousands/µL 1 63    Monocytes Absolute 0 17 - 1 22 Thousand/µL 1  63High     Eosinophils Absolute 0 00 - 0 61 Thousand/µL 0  73High     Basophils Absolute 0 00 - 0 10 Thousands/µL 0  13High

## 2021-02-08 NOTE — TREATMENT PLAN
I discussed recent events with the patient's daughters who are currently acting as power of   She has become more somnolent  She is unable to get out of bed  Her kidney function is worsening  Despite being off Coumadin, her INR is increasing  Overall, her performance and mental status have declined  When discussing possible treatment options for vulvar cancer with the patient previously, she stated she did not want treatment with radiation or chemotherapy and was only interested in possible palliative surgery as this may help improve her functional status  Based on her current state, palliative surgery is more likely to cause harm than benefit  As her quality of life is poor and she has expressed wishes while lucid that interventions not prolong a poor quality of life, I told the family that hospice consultation is appropriate  The other option would be to step up her level of care, provide supplemental nutrition, then consider a possible palliative surgery if her performance status improves  Either way, will plan to cancel her case tomorrow  By the end of the conversation, her daughters were interested in discussing hospice options    This was related to her primary medical team

## 2021-02-08 NOTE — CONSULTS
Consult - Podiatry   Caryn Riedel 80 y o  female MRN: [id number]  Unit/Bed#: [address] [place] [address] Encounter: [id number]    Assessment/Plan     Assessment:  Deep tissue injury bilateral hallux etiology  embolic versus pressure induced  Pain to both feet  Plan:  In light of patient undergoing palliative care consult for possible hospice care, we will recommend palliative measures for deep tissue injury Betadine paint bilateral hallucal , monitoring signs of infection and demarcation if any  Pain management per Medicine    History of Present Illness     HPI:  Caryn Riedel is a 80 y o  female who presents with bilateral hallux deep tissue injury, patient was admitted hospital in patient for the management of metastatic cancer, patient is not providing much history, patient report pain to feet upon touch,  daughter bedside are and aware of the course of her foot condition  Inpatient consult to Podiatry  Consult performed by: Melissa Joyce DPM  Consult ordered by: Beatriz Hart DO        Review of Systems   Constitutional: Negative  Gastrointestinal: Negative  Musculoskeletal:  Pain   Skin:  Ulcer  Neurological: Negative  Psych: negative         Historical Information   Past Medical History:   Diagnosis Date    Atrial fibrillation ([facility] [address] )     Basal cell carcinoma     Hyperlipidemia     Hypothyroid     Kidney disease      Past Surgical History:   Procedure Laterality Date    CARDIAC PACEMAKER PLACEMENT      MOHS SURGERY  1998    shaving lesion, on face below left side of nose basal cell carcinoma     Social History   Social History     Substance and Sexual Activity   Alcohol Use Yes    Alcohol/week: 0 0 - 1 0 standard drinks     Social History     Substance and Sexual Activity   Drug Use No     Social History     Tobacco Use   Smoking Status Former Smoker    Quit date:     Years since quittin 1   Smokeless Tobacco Never Used     Family History:   Family History   Problem Relation Age of Onset    Alzheimer's disease Mother     Parkinsonism Mother         symptomatic    Macular degeneration Mother     Stroke Father     Vascular Disease Father     Heart disease Maternal Uncle         cardiac    COPD Son     Heart disease Maternal Uncle     Heart disease Maternal Uncle     Heart disease Maternal Uncle     Heart disease Maternal Uncle        Meds/Allergies   Medications Prior to Admission   Medication    ALPRAZolam (XANAX) 0 5 mg tablet    Cinnamon 500 MG capsule    Copper Gluconate (COPPER CAPS PO)    DIOVAN 320 MG tablet    Glucosamine-Chondroitin (GLUCOSAMINE CHONDR COMPLEX PO)    Glucosamine-MSM-Hyaluronic Acd (JOINT HEALTH PO)    levothyroxine 112 mcg tablet    Magnesium 250 MG TABS    metoprolol succinate (TOPROL-XL) 50 mg 24 hr tablet    Multiple Vitamins-Minerals (CENTRUM ULTRA WOMENS) TABS    Multiple Vitamins-Minerals (PRESERVISION AREDS 2+MULTI VIT PO)    potassium chloride (KCl) 2 mEq/mL SOLN    simvastatin (ZOCOR) 40 mg tablet    VITAMIN D PO    warfarin (COUMADIN) 5 mg tablet    Zinc Sulfate (ZINC 15 PO)    VITAMIN A PO     Allergies   Allergen Reactions    Other Other (See Comments)     Category: Adverse Reaction; Annotation - [id number]: HORSE SERUM - Pt states was used several years ago in tetanus boosters when she was a child         Objective   First Vitals:   Blood Pressure: 111/52 (02/01/21 1102)  Pulse: 86 (02/01/21 1102)  Temperature: 97 6 °F (36 4 °C) (02/01/21 1102)  Temp Source: Oral (02/01/21 1102)  Respirations: 14 (02/01/21 1102)  Height: 5' 8" (172 7 cm) (02/01/21 1545)  Weight - Scale: 79 8 kg (176 lb) (02/01/21 1545)  SpO2: 98 % (02/01/21 1102)    Current Vitals:   Blood Pressure: 110/56 (02/08/21 1529)  Pulse: 73 (02/08/21 1529)  Temperature: 97 6 °F (36 4 °C) (02/08/21 1529)  Temp Source: Oral (02/08/21 1529)  Respirations: 18 (02/08/21 1529)  Height: 5' 8" (172 7 cm) (02/01/21 1545)  Weight - Scale: 79 3 kg (174 lb 14 4 oz) (02/08/21 0600)  SpO2: 96 % (02/08/21 1529)        /56 (BP Location: Right arm)   Pulse 73   Temp 97 6 °F (36 4 °C) (Oral)   Resp 18   Ht 5' 8" (1 727 m)   Wt 79 3 kg (174 lb 14 4 oz)   SpO2 96%   BMI 26 59 kg/m²      General Appearance:    Alert, cooperative, no distress   Head:    Normocephalic, without obvious abnormality, atraumatic   Extremities:   Pes planus type of foot   Pulses:   Nonpalpable PT pulse, palpable DP pulse   Skin:   Small soft tissue injury distal hallucal tips, no demarcation noted no erythema, no edema, no open lesion, no clinical signs of infection   Neurologic:   Gross sensation is intact  Protective sensation is intact  Lab Results:   No results displayed because visit has over 200 results  Invalid input(s): LABAEARO            Imaging: I have personally reviewed pertinent films in PACS  EKG, Pathology, and Other Studies: I have personally reviewed pertinent reports        Code Status: Level 3 - DNAR and DNI  Advance Directive and Living Will:      Power of : Yes  POLST:

## 2021-02-08 NOTE — PLAN OF CARE
Problem: Potential for Falls  Goal: Patient will remain free of falls  Description: INTERVENTIONS:  - Assess patient frequently for physical needs  -  Identify cognitive and physical deficits and behaviors that affect risk of falls  -  [place] fall precautions as indicated by assessment   - Educate patient/family on patient safety including physical limitations  - Instruct patient to call for assistance with activity based on assessment  - Modify environment to reduce risk of injury  - Consider OT/PT consult to assist with strengthening/mobility  Outcome: Progressing     Problem: Prexisting or High Potential for Compromised Skin Integrity  Goal: Skin integrity is maintained or improved  Description: INTERVENTIONS:  - Identify patients at risk for skin breakdown  - Assess and monitor skin integrity  - Assess and monitor nutrition and hydration status  - Monitor labs   - Assess for incontinence   - Turn and reposition patient  - Assist with mobility/ambulation  - Relieve pressure over bony prominences  - Avoid friction and shearing  - Provide appropriate hygiene as needed including keeping skin clean and dry  - Evaluate need for skin moisturizer/barrier cream  - Collaborate with interdisciplinary team   - Patient/family teaching  - Consider wound care consult   Outcome: Progressing     Problem: Nutrition/Hydration-ADULT  Goal: Nutrient/Hydration intake appropriate for improving, restoring or maintaining nutritional needs  Description: Monitor and assess patient's nutrition/hydration status for malnutrition  Collaborate with interdisciplinary team and initiate plan and interventions as ordered  Monitor patient's weight and dietary intake as ordered or per policy  Utilize nutrition screening tool and intervene as necessary  Determine patient's food preferences and provide high-protein, high-caloric foods as appropriate       INTERVENTIONS:  - Monitor oral intake, urinary output, labs, and treatment plans  - Assess nutrition and hydration status and recommend course of action  - Evaluate amount of meals eaten  - Assist patient with eating if necessary   - Allow adequate time for meals  - Recommend/ encourage appropriate diets, oral nutritional supplements, and vitamin/mineral supplements  - Assess need for intravenous fluids  - Provide specific nutrition/hydration education as appropriate  - Include patient/family/caregiver in decisions related to nutrition  Outcome: Progressing

## 2021-02-08 NOTE — ESCALATED TEAM TX
LOS - 7 days    SW participated in treatment team meeting today with physicians, nurse manager and daughters to discuss pt's current condition, options for care going forward and prognosis  Pt had expressed to physicians in the past her desire for hospice care should her condition decline  After talking with family about condition, options and prognosis pt's daughters have chosen to honor pt's wishes and explore hospice care for pt  SW discussed different levels of hospice care and hospice agencies  Pt will not be able to return home on hospice  Family is requesting evaluation by [facility] for possible inpatient hospice at [facility]  Referral will be made  SW will continue to follow to monitor needs and support/assist as needed

## 2021-02-08 NOTE — UTILIZATION REVIEW
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, oncology & gyn oncology following       Pertinent Labs/Diagnostic Results:   Results from last 7 days   Lab Units 02/08/21  0543 02/07/21  0618 02/06/21  0631 02/05/21  0609 02/04/21  0509   WBC Thousand/uL 26 06* 28 04* 22 59* 25 57* 25 50*   HEMOGLOBIN g/dL 6 6* 7 6* 7 9* 8 9* 9 7*   HEMATOCRIT % 21 0* 24 5* 24 7* 28 3* 31 2*   PLATELETS Thousands/uL 371 366 304 296 276   NEUTROS ABS Thousands/µL 21 36* 22 87* 18 41* 21 02* 21 34*     Results from last 7 days   Lab Units 02/08/21  0543 02/07/21  0618 02/06/21  0631 02/05/21  0609 02/04/21  0509   SODIUM mmol/L 131* 130* 132* 131* 134*   POTASSIUM mmol/L 4 3 4 6 4 2 4 2 4 0   CHLORIDE mmol/L 101 97* 100 100 102   CO2 mmol/L 22 20* 21 24 25   ANION GAP mmol/L 8 13 11 7 7   BUN mg/dL 89* 86* 71* 46* 21   CREATININE mg/dL 3 22* 3 10* 2 49* 2 23* 1 39*   EGFR ml/min/1 73sq m 13 13 17 20 35   CALCIUM mg/dL 8 5 8 6 9 0 9 4 9 6   MAGNESIUM mg/dL 2 7* 2 5 2 2 2 0 1 8   PHOSPHORUS mg/dL 5 3* 5 6* 4 1 3 4 3 0     Results from last 7 days   Lab Units 02/08/21  0543 02/07/21  0618 02/06/21  0631 02/05/21  0609 02/04/21  0509   AST U/L 49* 46* 34 27 19   ALT U/L 21 21 19 15 14   ALK PHOS U/L 88 74 72 82 92   TOTAL PROTEIN g/dL 5 1* 5 4* 5 4* 5 8* 6 0*   ALBUMIN g/dL 1 5* 1 7* 1 6* 1 7* 1 8*   TOTAL BILIRUBIN mg/dL 0 20 0 30 0 20 0 30 0 50     Results from last 7 days Lab Units 02/08/21  1121 02/08/21  0715 02/07/21  2126 02/07/21  1649 02/07/21  1137 02/07/21  0745 02/06/21  2141 02/06/21  1644 02/06/21  1112 02/06/21  0833 02/05/21  2102 02/05/21  1631   POC GLUCOSE mg/dl 123 95 138 106 109 110 116 130 134 125 113 124     Results from last 7 days   Lab Units 02/08/21  0543 02/07/21  0618 02/06/21  0631 02/05/21  3177 02/04/21  0509 02/03/21  0546 02/02/21  0444   GLUCOSE RANDOM mg/dL 105 96 103 107 113 122 104     Results from last 7 days   Lab Units 02/08/21  1136 02/08/21  0543 02/07/21  0618   PROTIME seconds 33 3* 34 7* 31 9*   INR  3 34* 3 51* 3 15*     Results from last 7 days   Lab Units 02/06/21  0631 02/05/21  0609   PROCALCITONIN ng/ml 1 25* 1 26*     Results from last 7 days   Lab Units 02/01/21  1923 02/01/21  1415   LACTIC ACID mmol/L 2 0 2 1*     Results from last 7 days   Lab Units 02/05/21  0609   CRP mg/L 165 5*   SED RATE mm/hour 39*     Results from last 7 days   Lab Units 02/07/21  0914 02/01/21  1444   CLARITY UA  Turbid Cloudy   COLOR UA  Orange Jeanna   SPEC GRAV UA  1 025 1 025   PH UA  5 0 6 0   GLUCOSE UA mg/dl Negative Negative   KETONES UA mg/dl Negative Negative   BLOOD UA  Large* Large*   PROTEIN UA mg/dl 100 (2+)* 100 (2+)*   NITRITE UA  Negative Negative   BILIRUBIN UA  Negative Interference- unable to analyze*   UROBILINOGEN UA E U /dl 0 2 0 2   LEUKOCYTES UA  Small* Moderate*   WBC UA /hpf 30-50* Innumerable*   RBC UA /hpf Innumerable* 20-30*   BACTERIA UA /hpf Occasional Innumerable*   EPITHELIAL CELLS WET PREP /hpf Occasional Moderate*     Results from last 7 days   Lab Units 02/01/21  1441 02/01/21  1415   BLOOD CULTURE   --  No Growth After 5 Days  No Growth After 5 Days     URINE CULTURE  >100,000 cfu/ml Escherichia coli*  >100,000 cfu/ml   --      Vital Signs: /56 (BP Location: Right arm)   Pulse 78   Temp 97 8 °F (36 6 °C) (Oral)   Resp 18   Ht 5' 8" (1 727 m)   Wt 79 3 kg (174 lb 14 4 oz)   SpO2 96%   BMI 26 59 kg/m² Medications:   insulin lispro, 1-6 Units, Subcutaneous, TID AC  insulin lispro, 1-6 Units, Subcutaneous, HS  lactated ringers, 1,000 mL, Intravenous, Once  levothyroxine, 100 mcg, Oral, Early Morning  metoprolol succinate, 150 mg, Oral, BID  multivitamin-minerals, 1 tablet, Oral, Daily  pravastatin, 80 mg, Oral, Daily With Dinner  triamcinolone, , Topical, BID    Continuous IV Infusions:  sodium chloride, 100 mL/hr, Intravenous, Continuous    PRN Meds:  acetaminophen, 650 mg, Oral, Q6H PRN  ALPRAZolam, 0 5 mg, Oral, HS PRN  glycerin-hypromellose-, 1 drop, Both Eyes, Q6H PRN  ondansetron, 4 mg, Oral, Q6H PRN  oxyCODONE-acetaminophen, 1 tablet, Oral, Q4H PRN  oxyCODONE-acetaminophen, 2 tablet, Oral, Q4H PRN    Discharge Plan: d    Network Utilization Review Department  ATTENTION: Please call with any questions or concerns to [phone] and carefully listen to the prompts so that you are directed to the right person  All voicemails are confidential   Rosemarie Mary all requests for admission clinical reviews, approved or denied determinations and any other requests to dedicated fax number below belonging to the campus where the patient is receiving treatment   List of dedicated fax numbers for the Facilities:  [address] DENIALS (Administrative/Medical Necessity) [phone]   [address] (Maternity/NICU/Pediatrics) [phone]   [address] [place] [phone] [facility] Isaura Nelson 9269 (  Akbar Mcdaniels [facility] "Lana" 103) [address] [place] [address] Christine Denise 1481 [phone]   [place] [facility] [place] [address] [phone]

## 2021-02-08 NOTE — PROGRESS NOTES
Spoke to patient's daughter Dave brock at length with her concerns about her mother being under hospice care at this time  It was explained to the daughter that a medical provider from her mother's primary team would evaluate and touch base with her today about her and her families concerns about hospice care and the overall plan of care  The daughter requested the names and phone numbers of the daytime supervisor and manager of [facility] so that she could contact them and request that her brother be allowed to visit with her mother at bedside in the hospital today, the daughter stated she would touch base with both parties today  An update was given: vital signs, and overall mentation and comfort of the patient was discussed with the daughter  The patient's daughter was asked to please call back with any further issues or concerns she may have

## 2021-02-09 PROBLEM — Z51.5 HOSPICE CARE: Status: ACTIVE | Noted: 2021-01-01

## 2021-02-09 PROBLEM — Z51.5 ADMISSION FOR HOSPICE CARE: Status: ACTIVE | Noted: 2021-01-01

## 2021-02-09 NOTE — CASE MANAGEMENT
Pt was accepted by [facility] for inpatient hospice level of care  Pt was transitioned to inpatient hospice today  Pt's daughter, Eri Hernandez, visiting with pt

## 2021-02-09 NOTE — PROGRESS NOTES
[facility] Progress Note - Daiana Anna 80 y o  female MRN: [id number]    Unit/Bed#: [address] [place] [address] Encounter: [id number]  Summary: Patient experienced worsening anemia requiring transfusion  She and her family expressed interest in hospice care  She will not undergo surgery tomorrow  * Generalized muscle weakness and persistent leukocytosis  Assessment & Plan  Patient presents with new onset weakness with inability to rise from toilet with 1 month of fatigue, reduced appetite, and weight loss  Etiology unknown  In context of patient with newly diagnosed vulvar intraepithelial neoplasia, has history of tobacco use, history of diverticulosis, uncontrolled diabetes, and arrhythmia  · CT chest, abdomen, pelvis:  Right-sided pulmonary nodule 2x1 3cm and enlarged arnulfo carinal lymph, Sigmoid fat stranding and enlarged left inguinal lymph node  · Abnormal UA (follow up urine culture)  · WBC: 24 56-> 25 5,-> 25 57 (persistent leukocytosis)  · Normoglycemia reduces suspicion for infection  · Discontinue Abx as per ID (help appreciated)  · Infectious disease  · CRP, ESR both elevated  · Fall precautions  · Follow up blood NGTD and Urine cultures positive for E coli (sensitive to ciprofloxacin which patient was on for 4 days) and patient had o symptoms  · Consult Oncology - help appreciated  · Flow cytometry, monitor CBC for WBCs and anemia  · Left shift and other cell lines elevated  Leukocytosis reactive but possibility of primary bone marrow disorder  flow cytometry on peripheral blood  Continue to monitor the WBC trend  Anemia workup can be later  Vulvar mass per gyn onc  Pulm nodule- concern for malignancy, IR consulted     · Gyn-Onc  · See recommendations in Vulvar intraepithelial Neoplasia  · Patient expressed interest in palliation and hospice  · Case management consult placed for hospice care, may need to be deferred for out-patient setting  ·  consult    Vulvar intraepithelial neoplasia, differentiated-type (dVIN)  Assessment & Plan  Patient recently seen in Dr Severa Mote office on 01/11/2021 and biopsy was taken  Results revealed vulvar intraepithelial neoplasia on 01/19/2021  Biopsy results shown below    Vulvar intraepithelial neoplasia, differentiated (simplex) type (dVIN), 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  -- Confirmed by positive p53 and negative p16/Ki-67 (increased proliferative index) immunostains  -- Associated spongiosis and inflammation (mostly neutrophils and eosinophils); special stain for fungus       (PAS) negative  - No definitive invasive carcinoma identified  Note: Although no definitive invasive carcinoma is identified, the biopsy is superficial and may not be representative of the entire lesion  Patient has (had) appointment scheduled with Dr Barbie Bowling (gynecology Oncology) on 2/4  Patient is reluctant to further workup for pulmonary nodule this time  Monitor leukocytosis and symptoms in determining disposition     Gyne-Onc (help appreciated)  · Plan for surgery Tuesday 8:15 a m -> canceled  · Following extensive discussion with Family and interdisciplinary team, patient and family agree to hospice care  Pulmonary nodule  Assessment & Plan  Potential biopsy  · IR consulted discontinued  · Patient expresses she does not wish to undergo biopsy this  · She is aware she cannot have biopsy concomitantly with vulvar mass surgery  · Oncology on board    Diverticulosis with fat stranding and leukocytosis  Assessment & Plan  CT: Diverticulosis coli  Possible segmental perisigmoid fat stranding may be due to acute diverticulitis, although evaluation is limited    · Surgical soft diet  · LR discontinued  · Continue Cipro 400mg p o  q12h and metronidazole 500mg p o  q8H  · Trend vitals, WBCs, and exam findings/symptoms  · Leukocytisis fluctuating 23 on 2/1 to 18 on 2/2 24 5 on 2/3, 25 5 on 2/4  · Appears to change in sync with hgb and platelets  · Patient tolerated diet advancement well  · ID recommended discontinuation of antibiotics    Acute on chronic kidney failure [facility])  Assessment & Plan  Lab Results   Component Value Date    EGFR 13 02/08/2021    EGFR 13 02/07/2021    EGFR 17 02/06/2021    CREATININE 3 22 (H) 02/08/2021    CREATININE 3 10 (H) 02/07/2021    CREATININE 2 49 (H) 02/06/2021   · Acute on chronic injury - Worsening KIKE  · Continue to monitor renal function  · Plasmalyte 100ml/hr  · Correct electrolyte abnormalities  · Hold Diovan/losartan, consider restarting tomorrow if renal function continues to improve  · Bladder scan, retention protocol, I/Os, daily weights  · Urine Na pending  · Consider Nephrology consult    Type 2 diabetes mellitus with diabetic chronic kidney disease [facility])  Assessment & Plan  Lab Results   Component Value Date    HGBA1C 5 8 09/05/2019       Recent Labs     02/05/21  1139 02/05/21  1631 02/05/21  2102 02/06/21  0833   POCGLU 113 124 113 125       Blood Sugar Average: Last 72 hrs:  (P) 122  Patient on sliding scale  Blood sugar has been within tolerable range    Paroxysmal atrial fibrillation ([facility] [address] )  Assessment & Plan  [id number]  On coumadin  Follows with [facility] cardiology (Dr Philip Salas visit 8/11/2020)  · Continue home metoprolol 150 mg p o   BID  · Telemetry  · Start patient on 2 5 mg coumadin daily, and monitor INR  · Aware patient on antibiotics which may affect P450 metabolism  · INR 2 06 on 2/2, 2 29 on 2/3, 2 47 on 2/4, 2 59 on 2/5  · Holding warfarin   · Start heparin drip    Acquired hypothyroidism  Assessment & Plan  Home levothyroxine 112mcg, consider change in dose  · TSH low at 0 322, dose reduced to 100mcg daily      Macular degeneration  Assessment & Plan  Patient reports taking OTC eye drops-> given artificial tears PRN  · Preservision not available in pharmacy  · Reports receiving a medication from her retinal specialist every 2 weeks    Right knee pain  Assessment & Plan  Warms to palpation of lateral aspect and tender with repositioning  US ordered - small to moderate effusion found  ID consulted - leukocytosis like 2/2 malignancy  Asymptomatic bacteruria  R knee US to assess effusion, if present consider arthrocentesis  Diverticulitis unlikely since no abd pain and tolerating diet so consider d/c antibiotics  2/5 suggest arthro right knee with cell count, bacterial cultures, crystal analysis  Discontinue Cipro and Flagyl  Ortho consult: Severe osteoarthritis of right knee  No obvious effusion but small, likely physiologic  Aspiration require US guidance  Not consistent with septic knee  With discomfort, no warmth, no erythema, no signs of infection  Pain management, PT for right knee  Left wrist pain  Assessment & Plan  Experiencing 2 day history of left wrist and associated swelling, patient unable to fully describe trauma (may have been caused during assisted transfer)  · Patient has left antecubital peripheral line proximal to edema -> asked nurse to switched to other arm  · Tenderness appears to be in soft tissues  · X-ray read pending  · Patient refusing ice  · Tylenol 650 PO PRN    Ingrown toenail  Assessment & Plan  First digits of bilateral feet  Cause of significant discomfort to the patient  - sensitivity of left great toe reported today  - continue to monitor for infection  - will start foot soaks and steroid therapy with topical triamcinolone  - podiatry consulted  Hypotension due to drugs  Assessment & Plan  Suspected due to opoid analgesia  Patient had BP in 80s/50s over night  · Patient given midodrine  · Attempt to find balance between adequate analgesia and maintaining BP  Hypotension may be reason for worsened KIKE    Blue toe syndrome (HCC)  Assessment & Plan    1 cm well-circumscribed ecchymotic lesion was noted on the tip of the 1st digit of the right foot, tender to palpation  · Patient is unclear how long this has been there    · With history of peripheral arterial disease, this could be a cholesterol emboli vs ischemic insult vs purple toe syndrome associated with warfarin therapy  · Doppler flow noted in dorsalis pedis pulse of right foot  Doppler flow not noted in posterior tibial pulse of right foot, dorsalis pedis pulse of left foot  Posterior tibial pulse of left foot was unable to be examined due to patient's positioning  · Continue pravastatin 80 mg daily  · Podiatry consult, recs appreciated    UTI (urinary tract infection)  Assessment & Plan  Asymptomatic  Urine culture positive for >100,000 E  Coli  Tenderness in lower abdomen on examination  Denies urinary symptoms  Patient currently on ciprofloxacin and metronidazole for presumed diverticulitis  · E coli sensitive to ciprofloxacin, which patient took for 3 days    Anemia  Assessment & Plan  11 on 2/1 -> 9 2 on 2/2-> 10 on 2/3-> 10 0 2/4-> 8 9 on 2/5-> 7 9-> 7 6 on 2/7-> 6 6 on 2/8  Continue to monitor  Hematology on board, help appreciated  Patient transfused one uncrossmatched unit of blood (reprotedly O+), patient given 1L bolus of LR and Tylenol (as per heme onc) and patient's BP and temp remained stable following transfusion  Unit of matched blood prepared and follow up H&H ordered  Bedside FOBT by ARIANNA was negative        History of sick sinus syndrome  Assessment & Plan  Pace maker in place    Type 2 diabetes mellitus with diabetic peripheral angiopathy without gangrene [facility])  Assessment & Plan  Lab Results   Component Value Date    HGBA1C 5 8 09/05/2019       Recent Labs     02/05/21  1139 02/05/21  1631 02/05/21  2102 02/06/21  0833   POCGLU 113 124 113 125       Blood Sugar Average: Last 72 hrs:  (P) 122    Peripheral arteriosclerosis (HCC)  Assessment & Plan  Pain and ecchymosis of of bilateral great toe pulp  Podiatry consulted      ---------------------------------------------------------------------------------------  SUBJECTIVE  Patient seen examined at bedside    Patient appeared increasingly weak today, patient initially expressed refusal for transfusion  Subsequent conversation patient was more somnolent, and seemed to expressed agreement but team reached out to daughter for transfusion consent  Review of Systems  Unable to evaluate due to severe fatigue   ---------------------------------------------------------------------------------------  OBJECTIVE    Vitals   Vitals:    21 1434 21 1455 21 1529 21 1810   BP: (!) 87/48 91/56 110/56 136/92   BP Location: Right arm Right arm Right arm    Pulse: 77 78 73    Resp:   18    Temp:   97 6 °F (36 4 °C)    TempSrc:   Oral    SpO2: 95%  96%    Weight:       Height:         Temp (24hrs), Av 8 °F (36 6 °C), Min:97 6 °F (36 4 °C), Max:98 2 °F (36 8 °C)  Current: Temperature: 97 6 °F (36 4 °C)          Physical Exam  Constitutional:       General: She is not in acute distress  Appearance: Normal appearance  She is normal weight  She is ill-appearing and toxic-appearing  She is not diaphoretic  Comments: Extremely fatigued   HENT:      Head: Normocephalic and atraumatic  Mouth/Throat:      Mouth: Mucous membranes are dry  Pharynx: Oropharynx is clear  Comments: Scar over left lip s/p BCC excision  Eyes:      General:         Right eye: No discharge  Left eye: No discharge  Extraocular Movements: Extraocular movements intact  Conjunctiva/sclera: Conjunctivae normal    Cardiovascular:      Rate and Rhythm: Normal rate and regular rhythm  Pulses: Normal pulses  Heart sounds: Murmur present  No friction rub  No gallop  Pulmonary:      Effort: Pulmonary effort is normal  No respiratory distress  Breath sounds: Normal breath sounds  No stridor  No wheezing, rhonchi or rales  Comments: Intermittent vocalizations of discomfort  Abdominal:      General: Abdomen is flat  Bowel sounds are normal  There is no distension  Palpations: Abdomen is soft   There is no mass       Tenderness: There is no abdominal tenderness  There is no guarding or rebound  Hernia: No hernia is present  Genitourinary:     Labia:         Left: Tenderness and lesion (mass) present  No injury  Comments: -3cm, tender, parasagittal left labial mass  -No purulence, erythema, active bleeding or odor detected  Intertriginous region of gluteal cleft, posterior to lesion is moist and irritated   -Scant blood from posterior mass  -That is ARIANNA revealed no mass, hemorrhoids, bleeding  -urinary incontinence  Musculoskeletal:         General: Swelling ( left wrist with active/passive ROM limited by pain (improved since yesterday), right knee with associated lateral warmth  Left knee also appears to be warmer on lateral aspect) and tenderness (reduced in left wrist since yesterday  Toes and right knee remained sensitive) present  Right lower leg: No edema  Left lower leg: No edema  Lymphadenopathy:      Head:      Right side of head: No submandibular adenopathy  Left side of head: No submandibular adenopathy  Cervical: No cervical adenopathy  Upper Body:      Right upper body: No axillary adenopathy  Left upper body: No axillary adenopathy  Lower Body: No right inguinal adenopathy  No left inguinal adenopathy  Skin:     General: Skin is warm and dry  Coloration: Skin is pale  Findings: Bruising ( tenderness and pulp of bilateral great toes) and lesion present  No erythema or rash  Neurological:      General: No focal deficit present  Mental Status: She is alert and oriented to person, place, and time  Mental status is at baseline  Cranial Nerves: No cranial nerve deficit  Sensory: No sensory deficit  Motor: Weakness (Generalized) present        Coordination: Coordination normal       Comments: Upper extremities 5/5 strength   Psychiatric:         Mood and Affect: Mood normal       Comments: Intermittently agitated/oppositional Laboratory and Diagnostics:  Results from last 7 days   Lab Units 02/08/21  0543 02/07/21  6736 02/06/21  0631 02/05/21  1794 02/04/21  0509 02/03/21  0546 02/02/21  0444   WBC Thousand/uL 26 06* 28 04* 22 59* 25 57* 25 50* 24 56* 18 28*   HEMOGLOBIN g/dL 6 6* 7 6* 7 9* 8 9* 9 7* 10 0* 9 2*   HEMATOCRIT % 21 0* 24 5* 24 7* 28 3* 31 2* 32 4* 29 7*   PLATELETS Thousands/uL 371 366 304 296 276 271 245   NEUTROS PCT % 82* 81* 81* 82* 84* 86* 77*   MONOS PCT % 6 7 8 9 10 8 10     Results from last 7 days   Lab Units 02/08/21  0543 02/07/21  0618 02/06/21  0631 02/05/21  0609 02/04/21  0509 02/03/21  0546 02/02/21  0444   SODIUM mmol/L 131* 130* 132* 131* 134* 134* 135*   POTASSIUM mmol/L 4 3 4 6 4 2 4 2 4 0 4 2 3 6   CHLORIDE mmol/L 101 97* 100 100 102 103 105   CO2 mmol/L 22 20* 21 24 25 24 25   ANION GAP mmol/L 8 13 11 7 7 7 5   BUN mg/dL 89* 86* 71* 46* 21 17 22   CREATININE mg/dL 3 22* 3 10* 2 49* 2 23* 1 39* 1 25 1 33*   CALCIUM mg/dL 8 5 8 6 9 0 9 4 9 6 9 4 9 3   GLUCOSE RANDOM mg/dL 105 96 103 107 113 122 104   ALT U/L 21 21 19 15 14 14 16   AST U/L 49* 46* 34 27 19 14 16   ALK PHOS U/L 88 74 72 82 92 94 83   ALBUMIN g/dL 1 5* 1 7* 1 6* 1 7* 1 8* 2 0* 2 0*   TOTAL BILIRUBIN mg/dL 0 20 0 30 0 20 0 30 0 50 0 50 0 50     Results from last 7 days   Lab Units 02/08/21  0543 02/07/21  0618 02/06/21  0631 02/05/21  0609 02/04/21  0509 02/03/21  0546 02/02/21  0444   MAGNESIUM mg/dL 2 7* 2 5 2 2 2 0 1 8 2 1 1 7   PHOSPHORUS mg/dL 5 3* 5 6* 4 1 3 4 3 0 2 9 3 0      Results from last 7 days   Lab Units 02/08/21  1136 02/08/21  0543 02/07/21  0618 02/06/21  0651 02/05/21  0609 02/04/21  0509 02/03/21  0546   INR  3 34* 3 51* 3 15* 2 97* 2 59* 2 47* 2 29*              ABG:    VBG:    Results from last 7 days   Lab Units 02/06/21  0631 02/05/21  0609   PROCALCITONIN ng/ml 1 25* 1 26*         Imaging: I have personally reviewed pertinent reports        Intake and Output  I/O       02/07 0701 - 02/08 0700 02/08 0701 - 02/09 0700    P  O   240    I V  (mL/kg) 1353 3 (17 1)     IV Piggyback 500     Total Intake(mL/kg) 1853 3 (23 4) 240 (3)    Urine (mL/kg/hr) 40 (0)     Stool 0     Total Output 40     Net +1813 3 +240          Unmeasured Urine Occurrence 4 x     Unmeasured Stool Occurrence 3 x           Height and Weights   Height: 5' 8" (172 7 cm)  IBW: 63 9 kg  Body mass index is 26 59 kg/m²  Weight (last 2 days)     Date/Time   Weight    02/08/21 0600   79 3 (174 9)    02/07/21 0600   79 3 (174 9)                Nutrition       Diet Orders   (From admission, onward)             Start     Ordered    02/08/21 1455  Diet NPO; Sips with meds  Diet effective now     Question Answer Comment   Diet Type NPO    NPO Except: Sips with meds    RD to adjust diet per protocol?  Yes        02/08/21 1454    02/05/21 1523  Dietary nutrition supplements  Once     Question Answer Comment   Select Supplement: Ensure Compact-Chocolate    Frequency Breakfast, Dinner        02/05/21 1522    02/04/21 1255  Room Service  Once     Question:  Type of Service  Answer:  Room Service - Appropriate with Assistance    02/04/21 1254                  Active Medications  Scheduled Meds:  Current Facility-Administered Medications   Medication Dose Route Frequency Provider Last Rate    acetaminophen  650 mg Oral Q6H PRN Ingris Musa MD      ALPRAZolam  0 5 mg Oral HS PRN Nikki Collazo, DO      glycerin-hypromellose-  1 drop Both Eyes Q6H PRN [place] Ozzie, DO      insulin lispro  1-6 Units Subcutaneous TID AC Ramon Horne, DO      insulin lispro  1-6 Units Subcutaneous HS Ramon Lui, DO      levothyroxine  100 mcg Oral Early Morning Olamide Montanez, DO      metoprolol succinate  150 mg Oral BID Nikki Collazo, DO      multivitamin-minerals  1 tablet Oral Daily [place] Citlaly Lui, DO      ondansetron  4 mg Oral Q6H PRN Monse Bajwa, DO      oxyCODONE-acetaminophen  1 tablet Oral Q4H PRN MD Stephon Esparza Rand oxyCODONE-acetaminophen  2 tablet Oral Q4H PRN Mook Calvo MD      pravastatin  80 mg Oral Daily With [employer], DO      sodium chloride  100 mL/hr Intravenous Continuous Anastasia Ren  mL/hr (02/08/21 1824)    triamcinolone   Topical BID Anastasia Deali, DO       Continuous Infusions:  sodium chloride, 100 mL/hr, Last Rate: 100 mL/hr (02/08/21 1824)      PRN Meds:   acetaminophen, 650 mg, Q6H PRN  ALPRAZolam, 0 5 mg, HS PRN  glycerin-hypromellose-, 1 drop, Q6H PRN  ondansetron, 4 mg, Q6H PRN  oxyCODONE-acetaminophen, 1 tablet, Q4H PRN  oxyCODONE-acetaminophen, 2 tablet, Q4H PRN        Invasive Devices Review  Invasive Devices     Peripheral Intravenous Line            Peripheral IV 02/04/21 Dorsal (posterior); Right Forearm 4 days    Peripheral IV 02/08/21 Dorsal (posterior); Right Hand less than 1 day                    Hillary Silva DO      Portions of the record may have been created with voice recognition software  Occasional wrong word or "sound a like" substitutions may have occurred due to the inherent limitations of voice recognition software    Read the chart carefully and recognize, using context, where substitutions have occurred

## 2021-02-09 NOTE — CASE MANAGEMENT
LOS - 8 days    SW following to assist with DCP  [facility] referral made yesterday  [facility] nurse arrived to evaluate pt  Case discussed with [facility] and hospice nurse will be meeting with pt's daughter, Lien Ramirez, who is visiting with pt to discuss plan of care  SW will continue to follow to support and assist as needed

## 2021-02-09 NOTE — PROGRESS NOTES
Progress Note - Infectious Disease   Jabire Clayton 80 y o  female MRN: [id number]  Unit/Bed#: [facility] 328-01 Encounter: [id number]      Impression/Recommendations:  1  Leukocytosis  Likely multifactorial and noninfectious due to malignancy, anemia, dehydration, possible gouty arthritis right knee, left arm phlebitis  No obvious source of infection  No change with empirical antibiotics for UTI  Blood cultures, CT C/A/P negative  Hemodynamically stable but appears globally ill  Rec:  ? Continue to follow closely off antibiotics  ? Patient to transition to hospice per family wishes     2   E  Coli UTI versus asymptomatic bacteruria  Status post 4 days cipro  No further treatment needed      3  KIKE on CKD  Multifactorial   Worsening        4   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      5   Right knee arthritis  Severe OA versus gout  Insufficient fluid to tap per Ortho evaluation  Patient declined U/S guided aspiration      6  Anemia  Progressive  Consider GIB given black stools noted by nursing  INR also supratherapeutic  Occult blood positive      We will sign off for now  Please call with new questions  Antibiotics:  Off antibiotics #5    Subjective:  Patient seen on AM rounds  Patient lethargic and appears uncomfortable with moving of sheets to examine legs  Offers no complaints  Limited ROS  24 Hour Events:  Participated in family meeting yesterday  Daughters have elected to pursue comfort care  No documented fevers, chills, sweats, nausea, vomiting, or diarrhea      Objective:  Vitals:  Temp:  [95 6 °F (35 3 °C)-97 6 °F (36 4 °C)] 97 6 °F (36 4 °C)  HR:  [73-84] 80  Resp:  [18-20] 18  BP: ()/(34-92) 94/44  SpO2:  [94 %-96 %] 94 %  Temp (24hrs), Av °F (36 1 °C), Min:95 6 °F (35 3 °C), Max:97 6 °F (36 4 °C)  Current: Temperature: 97 6 °F (36 4 °C)    Physical Exam:   General:  Appears uncomfortable with exam  Psychiatric:  Awake but lethargic  Pulmonary:  Normal respiratory excursion without accessory muscle use  Abdomen:  Soft, nontender  Extremities:  Nonpitting leg edema bilaterally  Skin:  No rashes    Lab Results:  I have personally reviewed pertinent labs  Results from last 7 days   Lab Units 02/08/21  0543 02/07/21  0618 02/06/21  0631   POTASSIUM mmol/L 4 3 4 6 4 2   CHLORIDE mmol/L 101 97* 100   CO2 mmol/L 22 20* 21   BUN mg/dL 89* 86* 71*   CREATININE mg/dL 3 22* 3 10* 2 49*   EGFR ml/min/1 73sq m 13 13 17   CALCIUM mg/dL 8 5 8 6 9 0   AST U/L 49* 46* 34   ALT U/L 21 21 19   ALK PHOS U/L 88 74 72     Results from last 7 days   Lab Units 02/09/21  0800 02/09/21  0344 02/08/21  0543 02/07/21  0618   WBC Thousand/uL 24 11*  --  26 06* 28 04*   HEMOGLOBIN g/dL 9 3* 8 2* 6 6* 7 6*   PLATELETS Thousands/uL 384  --  371 366           Imaging Studies:   I have personally reviewed pertinent imaging study reports and images in PACS  EKG, Pathology, and Other Studies:   I have personally reviewed pertinent reports

## 2021-02-09 NOTE — ASSESSMENT & PLAN NOTE
Patient and family consented to hospice care with comfort care measures    - morphine for pain  - ativan for agitatio  - no labs  - no vitals  - comfort foods

## 2021-02-09 NOTE — SPEECH THERAPY NOTE
Speech Language/Pathology    Speech-Language Evaluation    Impression:  Pt  Presents with language and cognitive impairments  Although patient has multiple medical concerns on this admission, it is unclear what the causation of the impairments are  There are no imagining studies on her head/brain during this admission  Recommendation:  Patient may be considered for comfort care/hospice  Will follow as inpatient to determine if needs AAC to meet/express needs  Current she is verbal and able to answer immediate questions  Current Medical Status:  Pt is a 80 y o  female who [facility] to [facility] with 1 month history of fatigue  Patient reports today while trying to get from the toilet she felt drained and unable to stand  Juliana Wasola reports this was a generalized feeling, denies one-sided weakness, dysarthria, change in bowel or bladder habits   Patient had recently been seen in outpatient setting regarding vulvar mass which causes her intermittent pain and bleeding  Patient has smoking history and weight loss/reduced appetite  Past Medical History:  See H&P for details      Special Studies:   2/1/21: CT chest abdomen pelvis wo contrast: 1  Irregular pulmonary nodule in the medial right upper lobe abutting the superior mediastinum measuring 2 0 x 1 3 cm   Based on current Fleischner Society 2017 Guidelines on incidental pulmonary nodule, PET/CT scan evaluation or tissue sampling may be   considered for further evaluation      2   Additional groundglass nodules measuring up to 7 mm above    3   Mild emphysema    4   Nonspecific enlarged precarinal lymph node    ABDOMEN AND PELVIS:  1   Diverticulosis coli   Possible segmental perisigmoid fat stranding may be due to acute diverticulitis, although evaluation is limited    2   Left greater than right nonspecific perinephric fat stranding, grossly similar to prior   No hydronephrosis    3  Prominent to mildly enlarged left inguinal lymph nodes, nonspecific      2/1/21: Chest Xray: No focal consolidation, pleural effusion, or pneumothorax  Social/Educational/vocational Hx:   Pt lives with family  Pt  States that she lives with her son Chio Mcfarland  Stated that she did the cooking and that he does not current work and is able to stay home with her  Language Evaluation:  Pt  Was noted to have max difficulty with language and cognition, however she is verbal  Able to answer some questions such as "who do you live with?", "where are you from?", and about her living situation  However patient was unable to answer current information questions, such as "where are you now?", "what is the name of this place?", "what is today's date?"  Pt  Was noted to spontaneously verbalize words and phrases out of context and randomly  She was also noted to make up rhymes and sing at times  Pt  Attempted to follow directions, however she could not move her upper extremities on command  Pt  Asked me to cover her feet with socks because they were "cold" however when attempted to put on sock- patient yelled in pain  Pain noted again when lifted her right arm over 40* and when blanket was removed from her head to help her eat/drink  She could not state where the pain was from or what it felt like  Patient is able to ask questions to meet her needs, however her medical status does interfere with ability to move  She was able to participate in swallowing evaluation  Did not assess if she could read or write  According to the chart, patient may be assessed for comfort care/hospice  Will follow during dysphagia sessions, if patient does not go this route       Tali Wynne MS CCC-SLP  Speech Language Pathologist  Available via [facility] License # [id number]  [facility] [address] # [id number]

## 2021-02-09 NOTE — PROGRESS NOTES
[facility] - Orlando Martinez 80 y o  female MRN: [id number]    Unit/Bed#: [address] [place] [address] Encounter: [id number]    Patient transitioned to Hospice care  She is transitioning to hospice swing bed  And the admit the to hospice order was meant to be entered into the new hospice encounter, which was entered into error in this inpatient encounter  Assessment/Plan:  * Hospice care  Assessment & Plan  Patient and family consented to hospice care with comfort care measures  - morphine for pain  - ativan for agitatio  - no labs  - no vitals  - comfort foods      Hypotension due to drugs  Assessment & Plan  Suspected due to opoid analgesia  Patient had BP in 80s/50s over night  · Patient given midodrine  · Attempt to find balance between adequate analgesia and maintaining BP  Hypotension may be reason for worsened KIKE    Blue toe syndrome (HCC)  Assessment & Plan    1 cm well-circumscribed ecchymotic lesion was noted on the tip of the 1st digit of the right foot, tender to palpation  · Patient is unclear how long this has been there  · With history of peripheral arterial disease, this could be a cholesterol emboli vs ischemic insult vs purple toe syndrome associated with warfarin therapy  · Doppler flow noted in dorsalis pedis pulse of right foot  Doppler flow not noted in posterior tibial pulse of right foot, dorsalis pedis pulse of left foot  Posterior tibial pulse of left foot was unable to be examined due to patient's positioning  · Continue pravastatin 80 mg daily  · Podiatry consult, recs appreciated    Right knee pain  Assessment & Plan  Warms to palpation of lateral aspect and tender with repositioning  US ordered - small to moderate effusion found  ID consulted - leukocytosis like 2/2 malignancy  Asymptomatic bacteruria  R knee US to assess effusion, if present consider arthrocentesis  Diverticulitis unlikely since no abd pain and tolerating diet so consider d/c antibiotics  2/5 suggest arthro right knee with cell count, bacterial cultures, crystal analysis  Discontinue Cipro and Flagyl  Ortho consult: Severe osteoarthritis of right knee  No obvious effusion but small, likely physiologic  Aspiration require US guidance  Not consistent with septic knee  With discomfort, no warmth, no erythema, no signs of infection  Pain management, PT for right knee  Left wrist pain  Assessment & Plan  Experiencing 2 day history of left wrist and associated swelling, patient unable to fully describe trauma (may have been caused during assisted transfer)  · Patient has left antecubital peripheral line proximal to edema -> asked nurse to switched to other arm  · Tenderness appears to be in soft tissues  · X-ray read pending  · Patient refusing ice  · Tylenol 650 PO PRN    UTI (urinary tract infection)  Assessment & Plan  Asymptomatic  Urine culture positive for >100,000 E  Coli  Tenderness in lower abdomen on examination  Denies urinary symptoms  Patient currently on ciprofloxacin and metronidazole for presumed diverticulitis  · E coli sensitive to ciprofloxacin, which patient took for 3 days    Anemia  Assessment & Plan  11 on 2/1 -> 9 2 on 2/2-> 10 on 2/3-> 10 0 2/4-> 8 9 on 2/5-> 7 9-> 7 6 on 2/7-> 6 6 on 2/8  Continue to monitor  Hematology on board, help appreciated  Patient transfused one uncrossmatched unit of blood (reprotedly O+), patient given 1L bolus of LR and Tylenol (as per heme onc) and patient's BP and temp remained stable following transfusion  Unit of matched blood prepared and follow up H&H ordered  Bedside FOBT by ARIANNA was negative        Diverticulosis with fat stranding and leukocytosis  Assessment & Plan  CT: Diverticulosis coli  Possible segmental perisigmoid fat stranding may be due to acute diverticulitis, although evaluation is limited    · Surgical soft diet  · LR discontinued  · Continue Cipro 400mg p o  q12h and metronidazole 500mg p o  q8H  · Trend vitals, WBCs, and exam findings/symptoms  · Leukocytisis fluctuating 23 on 2/1 to 18 on 2/2 24 5 on 2/3, 25 5 on 2/4  · Appears to change in sync with hgb and platelets  · Patient tolerated diet advancement well  · ID recommended discontinuation of antibiotics    Generalized muscle weakness and persistent leukocytosis  Assessment & Plan  Patient presents with new onset weakness with inability to rise from toilet with 1 month of fatigue, reduced appetite, and weight loss  Etiology unknown  In context of patient with newly diagnosed vulvar intraepithelial neoplasia, has history of tobacco use, history of diverticulosis, uncontrolled diabetes, and arrhythmia  · CT chest, abdomen, pelvis:  Right-sided pulmonary nodule 2x1 3cm and enlarged arnulfo carinal lymph, Sigmoid fat stranding and enlarged left inguinal lymph node  · Abnormal UA (follow up urine culture)  · WBC: 24 56-> 25 5,-> 25 57 (persistent leukocytosis)  · Normoglycemia reduces suspicion for infection  · Discontinue Abx as per ID (help appreciated)  · Infectious disease  · CRP, ESR both elevated  · Fall precautions  · Follow up blood NGTD and Urine cultures positive for E coli (sensitive to ciprofloxacin which patient was on for 4 days) and patient had o symptoms  · Consult Oncology - help appreciated  · Flow cytometry, monitor CBC for WBCs and anemia  · Left shift and other cell lines elevated  Leukocytosis reactive but possibility of primary bone marrow disorder  flow cytometry on peripheral blood  Continue to monitor the WBC trend  Anemia workup can be later  Vulvar mass per gyn onc  Pulm nodule- concern for malignancy, IR consulted     · Gyn-Onc  · See recommendations in Vulvar intraepithelial Neoplasia  · Patient expressed interest in palliation and hospice  · Case management consult placed for hospice care, may need to be deferred for out-patient setting  ·  consult    Pulmonary nodule  Assessment & Plan  Potential biopsy  · IR consulted discontinued  · Patient expresses she does not wish to undergo biopsy this  · She is aware she cannot have biopsy concomitantly with vulvar mass surgery  · Oncology on board    History of sick sinus syndrome  Assessment & Plan  Pace maker in place    Vulvar intraepithelial neoplasia, differentiated-type (dVIN)  Assessment & Plan  Patient recently seen in Dr Dayami Delvalle office on 01/11/2021 and biopsy was taken  Results revealed vulvar intraepithelial neoplasia on 01/19/2021  Biopsy results shown below    Vulvar intraepithelial neoplasia, differentiated (simplex) type (dVIN), 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  -- Confirmed by positive p53 and negative p16/Ki-67 (increased proliferative index) immunostains  -- Associated spongiosis and inflammation (mostly neutrophils and eosinophils); special stain for fungus       (PAS) negative  - No definitive invasive carcinoma identified  Note: Although no definitive invasive carcinoma is identified, the biopsy is superficial and may not be representative of the entire lesion  Patient has (had) appointment scheduled with Dr Keara Gomez (gynecology Oncology) on 2/4  Patient is reluctant to further workup for pulmonary nodule this time  Monitor leukocytosis and symptoms in determining disposition     Gyne-Onc (help appreciated)  · Plan for surgery Tuesday 8:15 a m -> canceled  · Following extensive discussion with Family and interdisciplinary team, patient and family agree to hospice care          Acute on chronic kidney failure [facility])  Assessment & Plan  Lab Results   Component Value Date    EGFR 13 02/08/2021    EGFR 13 02/07/2021    EGFR 17 02/06/2021    CREATININE 3 22 (H) 02/08/2021    CREATININE 3 10 (H) 02/07/2021    CREATININE 2 49 (H) 02/06/2021   · Acute on chronic injury - Worsening KIKE  · Continue to monitor renal function  · Plasmalyte 100ml/hr  · Correct electrolyte abnormalities  · Hold Diovan/losartan, consider restarting tomorrow if renal function continues to improve  · Bladder scan, retention protocol, I/Os, daily weights  · Urine Na pending  · Consider Nephrology consult    Type 2 diabetes mellitus with diabetic peripheral angiopathy without gangrene [facility])  Assessment & Plan  Lab Results   Component Value Date    HGBA1C 5 8 09/05/2019       Recent Labs     02/05/21  1139 02/05/21  1631 02/05/21  2102 02/06/21  0833   POCGLU 113 124 113 125       Blood Sugar Average: Last 72 hrs:  (P) 122    Type 2 diabetes mellitus with diabetic chronic kidney disease [facility])  Assessment & Plan  Lab Results   Component Value Date    HGBA1C 5 8 09/05/2019       Recent Labs     02/05/21  1139 02/05/21  1631 02/05/21  2102 02/06/21  0833   POCGLU 113 124 113 125       Blood Sugar Average: Last 72 hrs:  (P) 122  Patient on sliding scale  Blood sugar has been within tolerable range    Macular degeneration  Assessment & Plan  Patient reports taking OTC eye drops-> given artificial tears PRN  · Preservision not available in pharmacy  · Reports receiving a medication from her retinal specialist every 2 weeks    Ingrown toenail  Assessment & Plan  First digits of bilateral feet  Cause of significant discomfort to the patient  - sensitivity of left great toe reported today  - continue to monitor for infection  - will start foot soaks and steroid therapy with topical triamcinolone  - podiatry consulted  Peripheral arteriosclerosis (HCC)  Assessment & Plan  Pain and ecchymosis of of bilateral great toe pulp  Podiatry consulted    Acquired hypothyroidism  Assessment & Plan  Home levothyroxine 112mcg, consider change in dose  · TSH low at 0 322, dose reduced to 100mcg daily      Paroxysmal atrial fibrillation [facility])  Assessment & Plan  [id number]  On coumadin  Follows with [facility] cardiology (Dr Degroot [place] visit 8/11/2020)  · Continue home metoprolol 150 mg p o   BID  · Telemetry  · Start patient on 2 5 mg coumadin daily, and monitor INR  · Aware patient on antibiotics which may affect P450 metabolism  · INR 2 06 on 2/2, 2 29 on 2/3, 2 47 on 2/4, 2 59 on 2/5  · Holding warfarin   · Start heparin drip        Subjective:   Patient was seen and examined at bedside  Patient's blood pressure was low overnight and she is requesting pain medicine  She did meet with her sister and hospice nurse today and comfort measures were initiated  Objective:     Vitals: Blood pressure (!) 94/44, pulse 80, temperature 97 6 °F (36 4 °C), temperature source Tympanic, resp  rate 18, height 5' 8" (1 727 m), weight 79 3 kg (174 lb 14 4 oz), SpO2 94 %  ,Body mass index is 26 59 kg/m²    Wt Readings from Last 3 Encounters:   02/08/21 79 3 kg (174 lb 14 4 oz)   01/11/21 81 6 kg (180 lb)   11/30/20 84 4 kg (186 lb)       Intake/Output Summary (Last 24 hours) at 2/9/2021 1719  Last data filed at 2/9/2021 0028  Gross per 24 hour   Intake 300 ml   Output --   Net 300 ml       Physical Exam: General appearance: cachectic, sickly  Lungs: clear to auscultation bilaterally  Heart: regular rate and rhythm, S1, S2 normal, no murmur, click, rub or gallop  Extremities: necrotic big toes, TTP throughout  Pulses: 1+ ,     Recent Results (from the past 24 hour(s))   Fingerstick Glucose (POCT)    Collection Time: 02/08/21 10:38 PM   Result Value Ref Range    POC Glucose 112 65 - 140 mg/dl   Hemoglobin and hematocrit, blood    Collection Time: 02/09/21  3:44 AM   Result Value Ref Range    Hemoglobin 8 2 (L) 11 5 - 15 4 g/dL    Hematocrit 26 3 (L) 34 8 - 46 1 %   Prepare Leukoreduced RBC: 2 Units    Collection Time: 02/09/21  6:15 AM   Result Value Ref Range    Unit Product Code S2646L43     Unit Number [id number]     Unit ABO O     Unit [facility] POS     Crossmatch Incompatible     Unit Dispense Status Presumed Trans     Unit Product Code N9591W68     Unit Number [id number]     Unit ABO A     Unit [facility] NEG     Crossmatch Compatible     Unit Dispense Status Presumed Trans    Protime-INR    Collection Time: 02/09/21  8:00 AM   Result Value Ref Range    Protime 27 9 (H) 11 6 - 14 5 seconds    INR 2 65 (H) 0 84 - 1 19   CBC and differential    Collection Time: 02/09/21  8:00 AM   Result Value Ref Range    WBC 24 11 (H) 4 31 - 10 16 Thousand/uL    RBC 3 20 (L) 3 81 - 5 12 Million/uL    Hemoglobin 9 3 (L) 11 5 - 15 4 g/dL    Hematocrit 29 9 (L) 34 8 - 46 1 %    MCV 93 82 - 98 fL    MCH 29 1 26 8 - 34 3 pg    MCHC 31 1 (L) 31 4 - 37 4 g/dL    RDW 15 0 11 6 - 15 1 %    MPV 9 9 8 9 - 12 7 fL    Platelets 941 332 - 055 Thousands/uL    nRBC 0 /100 WBCs    Neutrophils Relative 84 (H) 43 - 75 %    Immat GRANS % 2 0 - 2 %    Lymphocytes Relative 4 (L) 14 - 44 %    Monocytes Relative 6 4 - 12 %    Eosinophils Relative 3 0 - 6 %    Basophils Relative 1 0 - 1 %    Neutrophils Absolute 20 60 (H) 1 85 - 7 62 Thousands/µL    Immature Grans Absolute 0 37 (H) 0 00 - 0 20 Thousand/uL    Lymphocytes Absolute 0 98 0 60 - 4 47 Thousands/µL    Monocytes Absolute 1 34 (H) 0 17 - 1 22 Thousand/µL    Eosinophils Absolute 0 69 (H) 0 00 - 0 61 Thousand/µL    Basophils Absolute 0 13 (H) 0 00 - 0 10 Thousands/µL   Procalcitonin with AM Reflex    Collection Time: 02/09/21  8:00 AM   Result Value Ref Range    Procalcitonin 0 45 (H) <=0 25 ng/ml   Fingerstick Glucose (POCT)    Collection Time: 02/09/21  8:02 AM   Result Value Ref Range    POC Glucose 95 65 - 140 mg/dl       No current facility-administered medications for this encounter  No current outpatient medications on file  Invasive Devices     Peripheral Intravenous Line            Peripheral IV 02/04/21 Dorsal (posterior); Right Forearm 5 days    Peripheral IV 02/08/21 Dorsal (posterior); Right Hand 1 day                Lab, Imaging and other studies: I have personally reviewed pertinent reports      VTE Pharmacologic Prophylaxis: Reason for no pharmacologic prophylaxis Hospice  VTE Mechanical Prophylaxis: reason for no mechanical VTE prophylaxis Hospice    Neda Acosta DO

## 2021-02-09 NOTE — SPEECH THERAPY NOTE
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    Type 2 diabetes mellitus with diabetic chronic kidney disease (HCC)    Type 2 diabetes mellitus with diabetic peripheral angiopathy without gangrene (HCC)    Acute on chronic kidney failure (HCC)    Vulvar intraepithelial neoplasia, differentiated-type (dVIN)    History of sick sinus syndrome    Pulmonary nodule    Diverticulosis with fat stranding and leukocytosis    Anemia    UTI (urinary tract infection)    Left wrist pain    Right knee pain    Blue toe syndrome (HCC)    Hypotension due to drugs    Pressure injury of deep tissue of toe of right foot    Pressure injury of deep tissue of toe of left foot           Past Medical History  Past Medical History:   Diagnosis Date    Atrial fibrillation ([facility] )     Basal cell carcinoma 1999    Hyperlipidemia     Hypothyroid     Kidney disease        Past Surgical History  Past Surgical History:   Procedure Laterality Date    CARDIAC PACEMAKER PLACEMENT      MOHS SURGERY  01/1998    shaving lesion, on face below left side of nose basal cell carcinoma       Summary    Pt presents with at least a minimal impairment due to inability to maintain an upright position and inability to fed self  Also presents with cognitive impairments- see speech and language evaluation for details       Recommendations:   Diet: puree/level 1 diet and thin liquids   Meds: crushed with puree   Frequent Oral care: 4x/day  Aspiration precautions and compensatory swallowing strategies: upright posture, only feed when fully alert, slow rate of feeding, small bites/sips and alternating bites and sips  Other Recommendations/ considerations: USE STRAWS       Current Medical Status  Pt is a 80 y o  female who presented to [facility] on [facility]/1/21 with 1 month history of fatigue  Patient reports today while trying to get from the toilet she felt drained and unable to stand  Patient reports this was a generalized feeling, denies one-sided weakness, dysarthria, change in bowel or bladder habits  Patient had recently been seen in outpatient setting regarding vulvar mass which causes her intermittent pain and bleeding  Patient has smoking history and weight loss/reduced appetite  Now referred for swallow and speech evaluation  Past medical history:   Please see H&P for details    Special Studies:  2/1/21: CT chest abdomen pelvis wo contrast: 1  Irregular pulmonary nodule in the medial right upper lobe abutting the superior mediastinum measuring 2 0 x 1 3 cm  Based on current Fleischner Society 2017 Guidelines on incidental pulmonary nodule, PET/CT scan evaluation or tissue sampling may be   considered for further evaluation      2   Additional groundglass nodules measuring up to 7 mm above    3   Mild emphysema    4   Nonspecific enlarged precarinal lymph node    ABDOMEN AND PELVIS:  1  Diverticulosis coli  Possible segmental perisigmoid fat stranding may be due to acute diverticulitis, although evaluation is limited    2   Left greater than right nonspecific perinephric fat stranding, grossly similar to prior  No hydronephrosis    3  Prominent to mildly enlarged left inguinal lymph nodes, nonspecific  2/1/21: Chest Xray: No focal consolidation, pleural effusion, or pneumothorax  Social/Education/Vocational Hx:  Pt lives with family  Pt  States that she lives with her son Zabrina Carballo  Stated that she did the cooking and that he does not current work and is able to stay home with her  Swallow Information   Current Risks for Dysphagia & Aspiration: AMS  Current Symptoms/Concerns: AMS  Current Diet: NPO   Baseline Diet: regular diet and thin liquids    Baseline Assessment   Behavior/Cognition: alert  Speech/Language Status: able to participate in basic conversation  See speech and language evaluation for full details  Patient Positioning: reclined     Swallow Mechanism Exam   Facial: symmetrical  Labial: unable to test 2/2 limited command following  Lingual: unable to test 2/2 limited command following  Velum: unable to visualize  Mandible: adequate ROM  Dentition: adequate  Vocal quality:clear/adequate   Volitional Cough: unable to initiate volitional cough   Respiratory: room air    Consistencies Assessed and Performance   Consistencies Administered: thin liquids and puree- patient unsafe for upgraded consistencies due to positioning and cognitive status  Oral Stage: at least a minimal impairment  Mastication, bolus formation, and transfer were adequate and timely with materials trialed today  No significant oral residue was noted  No pocketing was noted  No overt s/s of reduced oral control  Orientation was impaired  Asked patient to hold her own cup and she stated "I am trying" however no movement noted in her arm  When attempted to assist her, at about 36* she stated to yell "ow that hurts" so had to feed her and assist with drinking  She was unable to drink from a cup due to her reclined positioning, however she was able to drink through a straw and manage the bolus  Pharyngeal Stage: grossly WFL  Swallow initiation appeared prompt  Laryngeal rise was palpated and judged to be within functional limits  No coughing, throat clearing, change in vocal quality, or respiratory status noted with today's materials        Esophageal Concerns: none reported      Results Reviewed with: patient and MD   Dysphagia Goals: pt will tolerate least restrictive diet  with least restrictive liquids without s/s of aspiration x2-3 sessions  Discharge recommendation: dependent on progress    Speech Therapy Prognosis   Prognosis: deferred    Prognosis Considerations: medical status, prior medical history and cognitive status     Carmen Eisenberg MS CCC-SLP  Speech Language Pathologist  Available via [facility] License # [id number]  [address] # [id number]

## 2021-02-09 NOTE — PHYSICAL THERAPY NOTE
02/09/21 0930   PT Last Visit   PT Visit Date 02/09/21   Note Type   Note Type Treatment   Cancel Reasons Medical status; Low BP per [employer] License Number  [id number] [place] [address] [id number]

## 2021-02-09 NOTE — CASE MANAGEMENT
LOS - 1 day    SW following to monitor needs and assist as needed  Pt was evaluated by [facility] earlier today and hospice nurse met with daughter and care team   Decision was made to place pt on inpatient hospice care with [facility]  SW will continue to follow to support and assist as needed

## 2021-02-10 NOTE — PROGRESS NOTES
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, renal failure and suspected GI bleed  Following family meeting with medical team, who was decided to pursue hospice comfort care per patient's wishes  - Morphine drip 1 milligram/hour  - Morphine 2 mg q 2 hours p r n   - Ativan 0 5 mg q2h prn  - no labs/no vitals  - dysphagia 1 thin liquid diet with Comfort feeds      Subjective/24 hr events:   Patient was seen examined at bedside  She is now hospice comfort care  She does report some pain but has difficulty articulating this to the nurse that she is weak and somnolent  Discussed with hospice nurse who discussed with patient and family  Will plan to transition patient to morphine drip for ease of administration and improved comfort  Objective:     Vitals: Blood pressure 119/64, pulse (!) 110, temperature 97 6 °F (36 4 °C), temperature source Temporal, resp  rate 20, SpO2 97 %  ,There is no height or weight on file to calculate BMI  Wt Readings from Last 3 Encounters:   02/08/21 79 3 kg (174 lb 14 4 oz)   01/11/21 81 6 kg (180 lb)   11/30/20 84 4 kg (186 lb)     No intake or output data in the 24 hours ending 02/10/21 1249    Physical Exam:  Patient is somnolent but arousable and cachectic appearing appears ill     Lungs are clear to auscultation  heart is regular rate rhythm  The rest of physical exam was deferred as patient is not comfort care      Recent Results (from the past 24 hour(s))   Fingerstick Glucose (POCT)    Collection Time: 02/09/21  9:03 PM   Result Value Ref Range    POC Glucose 101 65 - 140 mg/dl       Current Facility-Administered Medications   Medication Dose Route Frequency Provider Last Rate Last Admin    bisacodyl (DULCOLAX) rectal suppository 10 mg  10 mg Rectal Daily PRN Matthew Beans, DO        LORazepam (ATIVAN) injection 0 5 mg  0 5 mg Intravenous Q4H PRN Matthew Beans, DO        morphine 250 mg in sodium chloride 0 9% 50mL drip  1 mg/hr Intravenous Continuous Matthew Beans, DO        morphine injection 2 mg  2 mg Intravenous Q2H PRN Matthew Beans, DO   2 mg at 02/10/21 1209       Invasive Devices     Peripheral Intravenous Line            Peripheral IV 02/10/21 Right Hand less than 1 day                Lab, Imaging and other studies: I have personally reviewed pertinent reports      VTE Pharmacologic Prophylaxis: Reason for no pharmacologic prophylaxis Comfort care  VTE Mechanical Prophylaxis: reason for no mechanical VTE prophylaxis Comfort care    Matthew Beans, DO

## 2021-02-10 NOTE — ASSESSMENT & PLAN NOTE
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

## 2021-02-10 NOTE — UTILIZATION REVIEW
Notification of Discharge  This is a Notification of Discharge from our facility [address] [place] [address]  Please be advised that this patient has been discharge from our facility  Below you will find the admission and discharge date and time including the patients disposition  PRESENTATION DATE: 2/1/2021 10:59 AM  OBS ADMISSION DATE:   IP ADMISSION DATE: 2/1/21 1414   DISCHARGE DATE: 2/9/2021  5:12 PM  DISPOSITION: [facility]   Admission Orders listed below:  Admission Orders (From admission, onward)     Ordered        02/09/21 1659  Admit Patient to  Once         02/09/21 1310  Admit Patient to  Once         02/01/21 1414  Inpatient Admission  Once                   Please contact the UR Department if additional information is required to close this patient's authorization/case  Sandie Cuongjo ann [facility] Utilization Review Department  Main: [phone] x carefully listen to the prompts  All voicemails are confidential   [email or web address]  Send all requests for admission clinical reviews, approved or denied determinations and any other requests to dedicated fax number below belonging to the campus where the patient is receiving treatment   List of dedicated fax numbers:  [address] [facility] [address] DENIALS (Administrative/Medical Necessity) [phone]   [address] (Maternity/NICU/Pediatrics) [phone]   Elyssa Cam [phone]   Chioma Osei [phone]   Yumiko Coley [phone]   Merlinda [place] [place] [address] [phone]   [facility]  [phone]   [address] [facility] [address]  [address] [facility] [address] [facility] [phone]

## 2021-02-11 NOTE — PROGRESS NOTES
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, renal failure and suspected GI bleed  Following family meeting with medical team, who was decided to pursue hospice comfort care per patient's wishes  She appears comfortable today  - Morphine drip 1 milligram/hour, increase drip prn  - Morphine 2 mg q 2 hours p r n   - Ativan 0 5 mg q2h prn  - no labs/no vitals  - dysphagia 1 thin liquid diet with Comfort feeds      Subjective:   Patient was seen and examined at bedside  Resting comfortably and awake but somnolent, does not appear to be in pain  Discussed with patient's daughter who feels patient is doing well  No other concerns  Objective:     Vitals: Blood pressure 99/58, pulse (!) 116, temperature 97 7 °F (36 5 °C), resp  rate 20, SpO2 98 %  ,There is no height or weight on file to calculate BMI  Wt Readings from Last 3 Encounters:   02/08/21 79 3 kg (174 lb 14 4 oz)   01/11/21 81 6 kg (180 lb)   11/30/20 84 4 kg (186 lb)       Intake/Output Summary (Last 24 hours) at 2/11/2021 1325  Last data filed at 2/10/2021 1900  Gross per 24 hour   Intake 50 93 ml   Output --   Net 50 93 ml       Physical Exam: No exam performed today, deferred as patient comfort care   No results found for this or any previous visit (from the past 24 hour(s))      Current Facility-Administered Medications   Medication Dose Route Frequency Provider Last Rate Last Admin    bisacodyl (DULCOLAX) rectal suppository 10 mg  10 mg Rectal Daily PRN Petra Chew, DO        LORazepam (ATIVAN) injection 0 5 mg  0 5 mg Intravenous Q4H PRN Petra Chew, DO   0 5 mg at 02/10/21 1341    morphine 250 mg in sodium chloride 0 9% 50mL drip  1 mg/hr Intravenous Continuous Petra Chew, DO 0 2 mL/hr at 02/10/21 1421 1 mg/hr at 02/10/21 1421    morphine injection 2 mg  2 mg Intravenous Q2H PRN Donnald Band, DO   2 mg at 02/11/21 0249       Invasive Devices     Peripheral Intravenous Line            Peripheral IV 02/10/21 Right Hand 1 day                Lab, Imaging and other studies: I have personally reviewed pertinent reports      VTE Pharmacologic Prophylaxis: Reason for no pharmacologic prophylaxis comfort care  VTE Mechanical Prophylaxis: reason for no mechanical VTE prophylaxis comfort care    Donnald Band, DO

## 2021-02-11 NOTE — PROGRESS NOTES
Morphine infusion started as per the orders, daughter and bedside, educated patient's DTR about the effects and side effects of morphine drip

## 2021-02-12 NOTE — PROGRESS NOTES
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, renal failure and suspected GI bleed  Following family meeting with medical team, it was decided to pursue hospice comfort care per patient's wishes  - Morphine drip 2 milligram/hour, increase drip prn  - Morphine 2 mg q 2 hours p r n   - Ativan 0 5 mg q2h prn  - no labs/no vitals  - dysphagia 1 thin liquid diet with Comfort feeds    Subjective:   Patient was seen and examined at bedside  Resting comfortably and awake but somnolent, does not appear to be in pain  Discussed with patient's daughter who feels patient is doing well  Consider increasing morphine drip  Will give ativan prior to bed changed  No other concerns  Objective:     Vitals: Blood pressure 96/54, pulse 99, temperature 97 5 °F (36 4 °C), temperature source Axillary, resp  rate 17, SpO2 96 %  ,There is no height or weight on file to calculate BMI  Wt Readings from Last 3 Encounters:   02/08/21 79 3 kg (174 lb 14 4 oz)   01/11/21 81 6 kg (180 lb)   11/30/20 84 4 kg (186 lb)       Intake/Output Summary (Last 24 hours) at 2/12/2021 0115  Last data filed at 2/11/2021 1901  Gross per 24 hour   Intake 0 ml   Output --   Net 0 ml       Physical Exam: No exam performed today, deferred as patient comfort care   No results found for this or any previous visit (from the past 24 hour(s))      Current Facility-Administered Medications   Medication Dose Route Frequency Provider Last Rate Last Admin    bisacodyl (DULCOLAX) rectal suppository 10 mg  10 mg Rectal Daily PRN Cy Viramontes DO        LORazepam (ATIVAN) injection 0 5 mg  0 5 mg Intravenous Q4H PRN Cy Viramontes DO   0 5 mg at 02/10/21 1341    morphine 250 mg in sodium chloride 0 9% 50mL drip  1 mg/hr Intravenous Continuous Maricarmen Wood DO 0 2 mL/hr at 02/10/21 1421 1 mg/hr at 02/10/21 1421    morphine injection 2 mg  2 mg Intravenous Q2H PRN Maricarmen Wood DO   2 mg at 02/11/21 0249       Invasive Devices     Peripheral Intravenous Line            Peripheral IV 02/10/21 Right Hand 1 day                Lab, Imaging and other studies: I have personally reviewed pertinent reports      VTE Pharmacologic Prophylaxis: Reason for no pharmacologic prophylaxis comfort care  VTE Mechanical Prophylaxis: reason for no mechanical VTE prophylaxis comfort care    Veronica Ramos DO

## 2021-02-13 NOTE — DEATH NOTE
INPATIENT DEATH NOTE  Ana Douglas 80 y o  female MRN: [id number]  Unit/Bed#: [address] [place] [address] Encounter: [id number]         Patient's Information  Pronounced by: Resident  Did the patient's death occur in the ED?: No  Did the patient's death occur in the OR?: No  Did the patient's death occur less than 10 days post-op?: No  Did the patient's death occur within 24 hours of admission?: No  Was code status DNR at the time of death?: Yes    PHYSICAL EXAM:  Unresponsive to noxious stimuli, Spontaneous respirations absent, Breath sounds absent, Carotid pulse absent, Heart sounds absent, Pupillary light reflex absent and Corneal blink reflex absent    Medical Examiner notification criteria:  NONE APPLICABLE   Medical Examiner's office notified?:  No, does not meet ME notification criteria   Medical Examiner accepted case?:  No  Name of Medical Examiner: NA    Family Notification  Was the family notified?: Yes  Date Notified: 21  Time Notified:   Notified by: Resident   Relationship to Patient: Daughter  Family Notification Route: At bedside  Was the family told to contact a  home?: Yes  Name of [facility][de-identified] (daughter needs to pick a  home)    Autopsy Options:  Decision for post-mortem examination not yet made by next of kin      Primary Service Attending Physician notified?:  yes - Attending:  Tonya Dunbar MD    Physician/Resident responsible for completing Discharge Summary:  Cameron Correa DO (PGY1)

## 2021-02-13 NOTE — CASE MANAGEMENT
[address] Aleksandar Azar Venegas present on unit and touched base with CM requesting to see pt      CM looked up pt in epic for bed location, after reviewing pts chart CM informed Ashley Venegas per documentation pt pass last evening at 10:06pm

## 2021-02-13 NOTE — DISCHARGE SUMMARY
Discharge Summary - Daiana Anna 80 y o  female MRN: [id number]    Unit/Bed#: [address] [place] [address] Encounter: [id number] PCP: New Mcqueen DO    Admission Date: 2021    Admitting Diagnosis: Hospice care [Z51 5]    HPI:     HPI from initial admission on 31  80-year-old female presents to emergency department for 1 month history of fatigue  Patient reports today while trying to get from the toilet she felt drained and unable to stand  Patient reports this was a generalized feeling, denies one-sided weakness, dysarthria, change in bowel or bladder habits  Patient had recently been seen in outpatient setting regarding vulvar mass which causes her intermittent pain and bleeding  Patient has smoking history and weight loss/reduced appetite  HPI when transitioned to hospice care   Patient was seen and examined at bedside  Patient's blood pressure was low overnight and she is requesting pain medicine  She did meet with her sister and hospice nurse today and comfort measures were initiated  Procedures Performed: No orders of the defined types were placed in this encounter  Summary of Hospital Course:  Patient was initially admitted for new onset weakness and acute kidney injury  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 as evidenced by pulmonary and lymphoid lesions noted on imaging  During the hospital course patient was found to be in acute renal failure secondary to on resolving acute kidney injury  She was eventually transitioned to hospice care after extensive discussion with her, her immediate family and an interdisciplinary medical team   Patient was maintained on comfort care with pain medications  Patient was pronounced dead on 2021 at 9:12 p m       Significant Findings, Care, Treatment and Services Provided: Hospice Care    Disposition:      Final Diagnosis: 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 Date, Time and Cause of Death    Date of Death: 21  Time of Death:  9:12 PM  Preliminary Cause of Death: Metastatic cancer ([facility] [address] )  Entered by: Sruthi Ren[RG1 1]     Attribution     RG1 1 Kathryn Shukla DO 21 22:11          Death Note:    INPATIENT DEATH NOTE  Ashwin Vaughan 80 y o  female MRN: [id number]  Unit/Bed#: [address] [place] [address] Encounter: [id number]         Patient's Information  Pronounced by: Resident  Did the patient's death occur in the ED?: No  Did the patient's death occur in the OR?: No  Did the patient's death occur less than 10 days post-op?: No  Did the patient's death occur within 24 hours of admission?: No  Was code status DNR at the time of death?: Yes    PHYSICAL EXAM:  Unresponsive to noxious stimuli, Spontaneous respirations absent, Breath sounds absent, Carotid pulse absent, Heart sounds absent, Pupillary light reflex absent and Corneal blink reflex absent    Medical Examiner notification criteria:  NONE APPLICABLE   Medical Examiner's office notified?:  No, does not meet ME notification criteria   Medical Examiner accepted case?:  No  Name of Medical Examiner: NA    Family Notification  Was the family notified?: Yes  Date Notified: 21  Time Notified:   Notified by: Resident   Relationship to Patient: Daughter  Family Notification Route: At bedside  Was the family told to contact a  home?: Yes  Name of [facility][de-identified] (daughter needs to pick a  home)    Autopsy Options:  Decision for post-mortem examination not yet made by next of kin      Primary Service Attending Physician notified?:  yes - Attending:  Renate Markham MD    Physician/Resident responsible for completing Discharge Summary:  Kathryn Shukla DO (PGY1)

## 2021-02-15 ENCOUNTER — TELEPHONE (OUTPATIENT)
Dept: FAMILY MEDICINE CLINIC | Facility: CLINIC | Age: 86
End: 2021-02-15

## 2021-02-15 NOTE — TELEPHONE ENCOUNTER
Viktor from [employer] is requesting that death certificate be singed as Patient passed away Case number is listed below;  Cremation is scheduled for tomorrow she is requesting this be done by midnight      Case ID [id number]

## 2021-02-18 ENCOUNTER — TELEPHONE (OUTPATIENT)
Dept: INPATIENT UNIT | Facility: HOSPITAL | Age: 86
End: 2021-02-18

## 2021-02-18 LAB — TRANSFUSION STATUS PATIENT QL: NORMAL

## 2021-04-01 ENCOUNTER — TELEPHONE (OUTPATIENT)
Dept: FAMILY MEDICINE CLINIC | Facility: CLINIC | Age: 86
End: 2021-04-01

## 2021-04-01 NOTE — TELEPHONE ENCOUNTER
Dr Milagros Medeiros placed a letter from patients insurance company [employer] for care considerations  Dr Milagros Medeiros filled in the "bubble" indicating the patient is now   Copy of form has been scanned into this encounter

## 2021-07-15 NOTE — ASSESSMENT & PLAN NOTE
End of Shift Note    Bedside shift change report given to Parvin Velasco RN (oncoming nurse) by Felix Leonardo RN (offgoing nurse). Report included the following information SBAR and Kardex    Shift worked:  7a-7p     Shift summary and any significant changes:     Pt received all care well. Pt had wound care. Only one complaint of pain this morning. Concerns for physician to address:  none     Zone phone for oncoming shift:   [phone]       Activity:  Activity Level: Up ad amanda  Number times ambulated in hallways past shift: 1  Number of times OOB to chair past shift: 2    Cardiac:   Cardiac Monitoring: Yes      Cardiac Rhythm: Sinus Rhythm    Access:   Current line(s): PIV     Genitourinary:   Urinary status: voiding    Respiratory:   O2 Device: None (Room air)  Chronic home O2 use?: NO  Incentive spirometer at bedside: NO     GI:  Last Bowel Movement Date: 07/12/21  Current diet:  ADULT DIET Regular; 3 carb choices (45 gm/meal); Low Sodium (2 gm); No Concentrated Sweets  DIET ONE TIME MESSAGE  DIET ONE TIME MESSAGE  DIET ONE TIME MESSAGE  Passing flatus: YES  Tolerating current diet: YES       Pain Management:   Patient states pain is manageable on current regimen: YES    Skin:  Wesley Score: 23  Interventions: increase time out of bed and limit briefs    Patient Safety:  Fall Score:  Total Score: 1  Interventions: gripper socks, pt to call before getting OOB and stay with me (per policy)       Length of Stay:  Expected LOS: 3d 19h  Actual LOS: 78 Jo Lomeli RN Right great toe  - sensitivity of left great toe reported today  - continue to monitor for infection